# Patient Record
Sex: MALE | Race: BLACK OR AFRICAN AMERICAN | NOT HISPANIC OR LATINO | Employment: UNEMPLOYED | URBAN - METROPOLITAN AREA
[De-identification: names, ages, dates, MRNs, and addresses within clinical notes are randomized per-mention and may not be internally consistent; named-entity substitution may affect disease eponyms.]

---

## 2017-03-03 ENCOUNTER — HOSPITAL ENCOUNTER (EMERGENCY)
Facility: HOSPITAL | Age: 57
Discharge: HOME | End: 2017-03-03
Attending: EMERGENCY MEDICINE
Payer: MEDICARE

## 2017-03-03 VITALS
SYSTOLIC BLOOD PRESSURE: 126 MMHG | HEART RATE: 122 BPM | OXYGEN SATURATION: 95 % | RESPIRATION RATE: 19 BRPM | TEMPERATURE: 99 F | DIASTOLIC BLOOD PRESSURE: 91 MMHG | HEIGHT: 66 IN | BODY MASS INDEX: 16.07 KG/M2 | WEIGHT: 100 LBS

## 2017-03-03 DIAGNOSIS — F10.920 ALCOHOL INTOXICATION, UNCOMPLICATED: Primary | ICD-10-CM

## 2017-03-03 PROCEDURE — 63600175 PHARM REV CODE 636 W HCPCS: Performed by: EMERGENCY MEDICINE

## 2017-03-03 PROCEDURE — 99285 EMERGENCY DEPT VISIT HI MDM: CPT | Mod: 25

## 2017-03-03 PROCEDURE — 25000003 PHARM REV CODE 250: Performed by: EMERGENCY MEDICINE

## 2017-03-03 PROCEDURE — 96372 THER/PROPH/DIAG INJ SC/IM: CPT

## 2017-03-03 RX ORDER — DIPHENHYDRAMINE HYDROCHLORIDE 50 MG/ML
25 INJECTION INTRAMUSCULAR; INTRAVENOUS
Status: DISCONTINUED | OUTPATIENT
Start: 2017-03-03 | End: 2017-03-03 | Stop reason: HOSPADM

## 2017-03-03 RX ORDER — LORAZEPAM 2 MG/ML
1 INJECTION INTRAMUSCULAR
Status: DISCONTINUED | OUTPATIENT
Start: 2017-03-03 | End: 2017-03-03 | Stop reason: HOSPADM

## 2017-03-03 RX ORDER — ZIPRASIDONE MESYLATE 20 MG/ML
20 INJECTION, POWDER, LYOPHILIZED, FOR SOLUTION INTRAMUSCULAR
Status: COMPLETED | OUTPATIENT
Start: 2017-03-03 | End: 2017-03-03

## 2017-03-03 RX ORDER — ACETAMINOPHEN 325 MG/1
650 TABLET ORAL
Status: COMPLETED | OUTPATIENT
Start: 2017-03-03 | End: 2017-03-03

## 2017-03-03 RX ADMIN — ZIPRASIDONE MESYLATE 20 MG: 20 INJECTION, POWDER, LYOPHILIZED, FOR SOLUTION INTRAMUSCULAR at 06:03

## 2017-03-03 RX ADMIN — ACETAMINOPHEN 650 MG: 325 TABLET ORAL at 12:03

## 2017-03-03 NOTE — ED AVS SNAPSHOT
[facility]  [address] [facility] [address]               Christiano JOE Baer   3/3/2017  5:08 AM   ED    Description:  Male : 1960   Department:  [facility]           Your Care was Coordinated By:     Provider Role From To    Abhijit England MD Attending Provider 17 0511 --      Reason for Visit     Altered Mental Status           Diagnoses this Visit        Comments    Alcohol intoxication, uncomplicated    -  Primary       ED Disposition     None           To Do List           Follow-up Information     Schedule an appointment as soon as possible for a visit with [facility].    Specialty:  Family Medicine    Contact information:    [address], [address]  [facility] [address] [phone]      [facility]     [facility] Care [facility] -  Assistance  Registered nurses in the [facility] provide clinical advisement, health education, appointment booking, and other advisory services.  Call for this free service at [phone].             Medications           Message regarding Medications     Verify the changes and/or additions to your medication regime listed below are the same as discussed with your clinician today.  If any of these changes or additions are incorrect, please notify your healthcare provider.        These medications were administered today        Dose Freq    ziprasidone injection 20 mg 20 mg ED 1 Time    Sig: Inject 20 mg into the muscle ED 1 Time.    Class: Normal    Route: Intramuscular    diphenhydrAMINE injection 25 mg 25 mg ED 1 Time    Sig: Inject 0.5 mLs (25 mg total) into the muscle ED 1 Time.    Class: Normal    Route: Intramuscular    lorazepam injection 1 mg 1 mg ED 1 Time    Sig: Inject 0.5 mLs (1 mg total) into the muscle ED 1 Time.    Class: Normal    Route: Intramuscular    acetaminophen tablet 650 mg 650 mg ED 1 Time    Sig: Take 2 tablets  "(650 mg total) by mouth ED 1 Time.    Class: Normal    Route: Oral           Verify that the below list of medications is an accurate representation of the medications you are currently taking.  If none reported, the list may be blank. If incorrect, please contact your healthcare provider. Carry this list with you in case of emergency.           Current Medications     albuterol 90 mcg/actuation inhaler Inhale 1-2 puffs into the lungs every 6 (six) hours as needed for Wheezing.    diphenhydrAMINE injection 25 mg Inject 0.5 mLs (25 mg total) into the muscle ED 1 Time.    lorazepam injection 1 mg Inject 0.5 mLs (1 mg total) into the muscle ED 1 Time.           Clinical Reference Information           Your Vitals Were     BP Pulse Temp Resp Height Weight    126/91 (BP Location: Right arm, Patient Position: Sitting, BP Method: Automatic) 122 98.9 °F (37.2 °C) (Oral) 19 5' 6" (1.676 m) 45.4 kg (100 lb)    SpO2 BMI             95% 16.14 kg/m2         Allergies as of 3/3/2017     No Known Allergies      Immunizations Administered on Date of Encounter - 3/3/2017     None      ED Micro, Lab, POCT     None      ED Imaging Orders     None      Discharge References/Attachments     ALCOHOL ABUSE (ENGLISH)      [facility] Sign-Up     Activating your [facility] account is as easy as 1-2-3!     1) Visit [email or web address], select Sign Up Now, enter this activation code and your date of birth, then select Next.  [email or web address]  Expires: 4/17/2017  5:51 AM      2) Create a username and password to use when you visit [facility] in the future and select a security question in case you lose your password and select Next.    3) Enter your e-mail address and click Sign Up!    Additional Information  If you have questions, please e-mail [email or web address] or call [phone] to talk to our [facility] staff. Remember, [facility] is NOT to be used for urgent needs. For medical emergencies, dial 911.          [facility] " complies with applicable Federal civil rights laws and does not discriminate on the basis of race, color, national origin, age, disability, or sex.        Language Assistance Services     ATTENTION: Language assistance services are available, free of charge. Please call [phone].      ATENCIÓN: Si habla español, tiene a ron disposición servicios gratuitos de asistencia lingüística. Llame al [phone].     CHÚ Ý: N?u b?n nói Ti?ng Vi?t, có các d?ch v? h? tr? ngôn ng? mi?n phí dành cho b?n. G?i s? [phone].

## 2017-03-03 NOTE — ED PROVIDER NOTES
Encounter Date: 3/3/2017       History     Chief Complaint   Patient presents with    Altered Mental Status     Pt was found at [employer] with +ETOH, EMS was called for pt sleeping on the floor. Pt's language is slurred and difficult to understand; pt has strong ETOH smell.      Review of patient's allergies indicates:  No Known Allergies  HPI Comments: 56-year-old male brought to the ED after being found sleeping at a gas station.  Patient denies any complaints, admits to alcohol intake but declines to report how much.  Denies any symptoms at this time.    The history is provided by the patient and the EMS personnel.     History reviewed. No pertinent past medical history.  Past Surgical History:   Procedure Laterality Date    CHEST TUBE INSERTION  November 2013     History reviewed. No pertinent family history.  Social History   Substance Use Topics    Smoking status: Current Every Day Smoker     Packs/day: 1.00     Types: Cigarettes    Smokeless tobacco: None    Alcohol use Yes      Comment: 2 pints of vodka last night     Review of Systems   Constitutional: Negative for chills, fatigue and fever.   HENT: Negative for congestion, rhinorrhea, sore throat and voice change.    Eyes: Negative for photophobia, pain and redness.   Respiratory: Negative for cough, choking and shortness of breath.    Cardiovascular: Negative for chest pain, palpitations and leg swelling.   Gastrointestinal: Negative for abdominal pain, diarrhea, nausea and vomiting.   Genitourinary: Negative for dysuria, frequency and urgency.   Musculoskeletal: Negative for back pain, neck pain and neck stiffness.   Neurological: Negative for seizures, speech difficulty, light-headedness, numbness and headaches.   All other systems reviewed and are negative.      Physical Exam   Initial Vitals   BP Pulse Resp Temp SpO2   03/03/17 0510 03/03/17 0510 03/03/17 0510 03/03/17 0510 03/03/17 0510   143/85 91 15 98.9 °F (37.2 °C) 95 %      Physical Exam    Nursing note and vitals reviewed.  Constitutional: He appears well-developed and well-nourished. No distress.   HENT:   Head: Normocephalic and atraumatic.   Dry mucous membranes   Eyes: Conjunctivae and EOM are normal. Pupils are equal, round, and reactive to light.   Neck: Normal range of motion. Neck supple. No tracheal deviation present.   Cardiovascular: Normal rate, regular rhythm, normal heart sounds and intact distal pulses.   Pulmonary/Chest: Breath sounds normal. No respiratory distress. He has no wheezes. He has no rhonchi. He has no rales.   Abdominal: Soft. Bowel sounds are normal. He exhibits no distension. There is no tenderness. There is no rebound and no guarding.   Musculoskeletal: Normal range of motion. He exhibits no edema or tenderness.   Neurological: He is alert and oriented to person, place, and time. He has normal strength. No cranial nerve deficit or sensory deficit.   Slurred speech consistent with alcohol intoxication   Skin: Skin is warm and dry.         ED Course   Procedures  Labs Reviewed - No data to display          Medical Decision Making:   Initial Assessment:   56-year-old male brought to the emergency department by EMS with admitted alcohol intake  Differential Diagnosis:   Head trauma, metabolic crisis, polysubstance abuse  ED Management:  Patient became agitated, cursing at the staff.  Consistent with intoxicated behavior.  He was administered Geodon 20 mg IM while we wait for clinical sobriety.    Handed off to Dr. Wade for reevaluation and disposition.                   ED Course     Clinical Impression:   The encounter diagnosis was Alcohol intoxication, uncomplicated.    Disposition:   Disposition: Discharged  Condition: Stable  Please see Dr. Wade's progress note for summary of disposition       Abhijit England MD  03/30/17 5651

## 2017-03-03 NOTE — ED NOTES
When MD went to place nasal trumpet pt became agitated and pulled out trumpet. NC remains in place with SPO2 92%, will continue to monitor.

## 2017-03-03 NOTE — PROVIDER PROGRESS NOTES - EMERGENCY DEPT.
Encounter Date: 3/3/2017    ED Physician Progress Notes        12:57 PM patient slept for hours  after his required chemical restraints.  He later had some decreasing sats (in the 90's, per the nurse). Of note, he was checked on 2 occasions briefly by me, I walked into the room and pt was still sedated but with normal respirations; also his nurse assessed him periodly, and on one check his sats were noted to be lower, so she put 2 liter O2 on him-when she rechecked him and found the sats to be dropping into upper 80's she then informed me--so I asked for a nasal trumpet--pt Immediately sat up and pulled the trumpet out and asked why I did that. From that point on pt was much more awake and talking and sitting up.  I later spoke to his daughter, who said her dad drinks heavily and she thinks he just needed to sleep off his alcohol. She will pick him up.  Just prior to his discharge, I spoke with pt, observed him ambulate, and discussed with him his presentation and care in ER. He was very pleasant and GSC 15 and generally nonfocal neuro exam, complained of a mild frontal headache so I ordered a Tylenol.    See nurses noted for specific times of these incidents

## 2017-03-03 NOTE — ED NOTES
Pt presents to ED via EMS from [employer]. EMS reports that they were called out by [employer] for the pt sleeping on the ground. EMS reports that CBG was 123. Pt reports drinking alcohol today, is AAOx4, eyes are red, denies any complaints. Pt requests the lights be turned off so that he can go to sleep.

## 2017-03-03 NOTE — ED NOTES
Pt yelling from room. Upon entering room pt is yelling obscenities. Security at BS. Pt refuses to calm down. MD notified.

## 2017-03-20 ENCOUNTER — HOSPITAL ENCOUNTER (EMERGENCY)
Facility: HOSPITAL | Age: 57
Discharge: HOME | End: 2017-03-20
Attending: EMERGENCY MEDICINE
Payer: MEDICARE

## 2017-03-20 VITALS
HEART RATE: 100 BPM | TEMPERATURE: 98 F | OXYGEN SATURATION: 98 % | SYSTOLIC BLOOD PRESSURE: 102 MMHG | RESPIRATION RATE: 18 BRPM | DIASTOLIC BLOOD PRESSURE: 76 MMHG

## 2017-03-20 DIAGNOSIS — F10.920 ALCOHOL INTOXICATION, UNCOMPLICATED: Primary | ICD-10-CM

## 2017-03-20 LAB
ALBUMIN SERPL BCP-MCNC: 2.7 G/DL
ALP SERPL-CCNC: 167 U/L
ALT SERPL W/O P-5'-P-CCNC: 20 U/L
ANION GAP SERPL CALC-SCNC: 13 MMOL/L
ANISOCYTOSIS BLD QL SMEAR: SLIGHT
AST SERPL-CCNC: 32 U/L
BASOPHILS # BLD AUTO: 0.08 K/UL
BASOPHILS NFR BLD: 1.3 %
BILIRUB SERPL-MCNC: 0.5 MG/DL
BUN SERPL-MCNC: 13 MG/DL
CALCIUM SERPL-MCNC: 8.3 MG/DL
CHLORIDE SERPL-SCNC: 105 MMOL/L
CO2 SERPL-SCNC: 22 MMOL/L
CREAT SERPL-MCNC: 0.9 MG/DL
DACRYOCYTES BLD QL SMEAR: ABNORMAL
DIFFERENTIAL METHOD: ABNORMAL
EOSINOPHIL # BLD AUTO: 0.1 K/UL
EOSINOPHIL NFR BLD: 0.8 %
ERYTHROCYTE [DISTWIDTH] IN BLOOD BY AUTOMATED COUNT: 15 %
EST. GFR  (AFRICAN AMERICAN): >60 ML/MIN/1.73 M^2
EST. GFR  (NON AFRICAN AMERICAN): >60 ML/MIN/1.73 M^2
ETHANOL SERPL-MCNC: 370 MG/DL
GLUCOSE SERPL-MCNC: 79 MG/DL
HCT VFR BLD AUTO: 44.9 %
HGB BLD-MCNC: 15.4 G/DL
HYPOCHROMIA BLD QL SMEAR: ABNORMAL
LYMPHOCYTES # BLD AUTO: 2.4 K/UL
LYMPHOCYTES NFR BLD: 39.5 %
MCH RBC QN AUTO: 34.8 PG
MCHC RBC AUTO-ENTMCNC: 34.3 %
MCV RBC AUTO: 101 FL
MONOCYTES # BLD AUTO: 0.5 K/UL
MONOCYTES NFR BLD: 8.6 %
NEUTROPHILS # BLD AUTO: 3 K/UL
NEUTROPHILS NFR BLD: 49.8 %
OVALOCYTES BLD QL SMEAR: ABNORMAL
PLATELET # BLD AUTO: 265 K/UL
PLATELET BLD QL SMEAR: ABNORMAL
PMV BLD AUTO: 10.4 FL
POIKILOCYTOSIS BLD QL SMEAR: SLIGHT
POTASSIUM SERPL-SCNC: 4.1 MMOL/L
PROT SERPL-MCNC: 7.9 G/DL
RBC # BLD AUTO: 4.43 M/UL
SODIUM SERPL-SCNC: 140 MMOL/L
TARGETS BLD QL SMEAR: ABNORMAL
WBC # BLD AUTO: 6.03 K/UL

## 2017-03-20 PROCEDURE — 99284 EMERGENCY DEPT VISIT MOD MDM: CPT

## 2017-03-20 PROCEDURE — 80053 COMPREHEN METABOLIC PANEL: CPT

## 2017-03-20 PROCEDURE — 85025 COMPLETE CBC W/AUTO DIFF WBC: CPT

## 2017-03-20 PROCEDURE — 93005 ELECTROCARDIOGRAM TRACING: CPT

## 2017-03-20 PROCEDURE — 80320 DRUG SCREEN QUANTALCOHOLS: CPT

## 2017-03-20 NOTE — ED PROVIDER NOTES
Encounter Date: 3/20/2017       History     Chief Complaint   Patient presents with    Fall     fell yesterday, lac to back of head, +history of etoh abuse, c/o generalized weakness     Review of patient's allergies indicates:  No Known Allergies  HPI Comments: Patient is a 56-year-old male brought in by EMS intoxicated.  Patient has a long-standing history of alcoholism.  He denies any illicit drug abuse.  He denies suicidal ideation.    The history is provided by the patient and the EMS personnel.     Past Medical History:   Diagnosis Date    ETOH abuse      Past Surgical History:   Procedure Laterality Date    CHEST TUBE INSERTION  November 2013     History reviewed. No pertinent family history.  Social History   Substance Use Topics    Smoking status: Current Every Day Smoker     Packs/day: 1.00     Types: Cigarettes    Smokeless tobacco: None    Alcohol use Yes      Comment: 2 pints of vodka last night     Review of Systems   Reason unable to perform ROS: Patient heavily intoxicated.       Physical Exam   Initial Vitals   BP Pulse Resp Temp SpO2   03/20/17 1511 03/20/17 1511 03/20/17 1511 03/20/17 1511 03/20/17 1511   102/76 100 18 98.4 °F (36.9 °C) 98 %     Physical Exam    Nursing note and vitals reviewed.  Constitutional:   Disheveled   HENT:   Head: Normocephalic.   Eyes: EOM are normal. Pupils are equal, round, and reactive to light.   Neck: Normal range of motion. Neck supple.   Cardiovascular: Normal rate, regular rhythm and normal heart sounds.   Pulmonary/Chest: Breath sounds normal.   Abdominal: Soft. There is no tenderness.   Musculoskeletal: Normal range of motion. He exhibits no edema.   Neurological: He is alert.   Skin: Skin is warm and dry.   Psychiatric: His behavior is normal.         ED Course   Procedures  Labs Reviewed   CBC W/ AUTO DIFFERENTIAL - Abnormal; Notable for the following:        Result Value    RBC 4.43 (*)      (*)     MCH 34.8 (*)     RDW 15.0 (*)     All other  components within normal limits   COMPREHENSIVE METABOLIC PANEL - Abnormal; Notable for the following:     CO2 22 (*)     Calcium 8.3 (*)     Albumin 2.7 (*)     Alkaline Phosphatase 167 (*)     All other components within normal limits   ALCOHOL,MEDICAL (ETHANOL) - Abnormal; Notable for the following:     Alcohol, Medical, Serum 370 (*)     All other components within normal limits    Narrative:         Alcohol level critical result(s) called and verbal readback   obtained from  Fiona Mendez RN. at 15:40 on 03/20/2017, 03/20/2017   15:40   URINALYSIS   DRUG SCREEN PANEL, URINE EMERGENCY     Imaging Results         X-Ray Chest PA And Lateral (Final result) Result time:  03/20/17 15:00:40    Final result by Sachi Limon MD (03/20/17 15:00:40)    Impression:     No significant change from the prior study.  There is significant architectural distortion of the right hemithorax which is unchanged.      Electronically signed by: SACHI LIMON MD  Date:     03/20/17  Time:    15:00     Narrative:    PA and lateral chest radiographs    Comparison: 11/8/14    Results: The cardiac silhouette is midline.  Architectural distortion right hemithorax is similar to the prior exam, there are large bullae.  The left hemithorax is relatively well aerated.  Minimal linear lung markings at the lung base suggestive of minimal atelectasis.  No focal air space disease.  No pleural effusion.  No pneumothorax.  Remote left rib fractures.              EKG Readings: (Independently Interpreted)   Rhythm: Sinus Tachycardia. Heart Rate: 106. ST Segments: Normal ST Segments. T Waves: Normal.                            ED Course     Clinical Impression:   The encounter diagnosis was Alcohol intoxication, uncomplicated.          William Gamnig MD  03/21/17 0902

## 2017-03-20 NOTE — ED AVS SNAPSHOT
[facility]  [address]  [place] [address]               Christiano Baer   3/20/2017  2:19 PM   ED    Description:  Male : 1960   Department:  [facility]           Your Care was Coordinated By:     Provider Role From To    William Gaming MD Attending Provider 17 1446 --      Reason for Visit     Fall           Diagnoses this Visit        Comments    Alcohol intoxication, uncomplicated    -  Primary       ED Disposition     None           To Do List           Follow-up Information     Follow up with [facility].    Specialty:  Emergency Medicine    Why:  As needed    Contact information:    [place] [address] [phone]      [facility]     [facility] -  Assistance  Registered nurses in the [facility] provide clinical advisement, health education, appointment booking, and other advisory services.  Call for this free service at [phone].             Medications           Message regarding Medications     Verify the changes and/or additions to your medication regime listed below are the same as discussed with your clinician today.  If any of these changes or additions are incorrect, please notify your healthcare provider.             Verify that the below list of medications is an accurate representation of the medications you are currently taking.  If none reported, the list may be blank. If incorrect, please contact your healthcare provider. Carry this list with you in case of emergency.           Current Medications     albuterol 90 mcg/actuation inhaler Inhale 1-2 puffs into the lungs every 6 (six) hours as needed for Wheezing.           Clinical Reference Information           Your Vitals Were     BP Pulse Temp Resp SpO2       102/76 100 98.4 °F (36.9 °C) 18 98%       Allergies as of 3/20/2017     No Known Allergies       Immunizations Administered on Date of Encounter - 3/20/2017     None      ED Micro, Lab, POCT     Start Ordered       Status Ordering Provider    03/20/17 1428 03/20/17 1427  Urinalysis  STAT      Acknowledged     03/20/17 1428 03/20/17 1427  Drug screen panel, emergency  STAT      Acknowledged     03/20/17 1428 03/20/17 1427  Ethanol  Once      Final result     03/20/17 1427 03/20/17 1427  CBC auto differential  Once      Final result     03/20/17 1427 03/20/17 1427  Comprehensive metabolic panel  Once      Final result       ED Imaging Orders     Start Ordered       Status Ordering Provider    03/20/17 1427 03/20/17 1427  X-Ray Chest PA And Lateral  1 time imaging      Final result         Discharge Instructions         Alcohol Intoxication  Alcohol intoxication occurs when you drink alcohol faster than your liver can remove it from your system. The following facts are important to remember:  · It can take 10 minutes or more to start to feel the effects of a drink, so you can easily get more intoxicated than you intended.  · One drink may be more than 1 serving of alcohol. Depending on the drink, it can be 2 to 4 servings.  · It takes about an hour for your body to metabolize (clear) 1 serving. If you have more than 1 drink, it can take a couple of hours or more.  · Many things affect how drinks will affect you, including whether youve eaten, how fast you drink, your size, how much you normally drink (or not), medicines you take, chronic diseases you have, and gender.  Signs and symptoms of alcohol poisoning  The following are signs and symptoms of alcohol poisoning:  Mild impairment  · Reduced inhibitions  · Slurred speech  · Drowsiness  · Decreased fine motor skills  Moderate impairment  · Erratic behavior, aggression, depression  · Impaired judgment  · Confusion  · Concentration difficulties  · Coordination problems  Severe impairment  · Vomiting  · Seizures  · Unconsciousness  · Cold, clammy  · Slow or  "irregular breathing  · Hypothermia (low body temperature)  · Coma  Health effects  Alcohol abuse causes health problems. Sometimes this can happen after only drinking a little." There is no set number of drinks or amount of alcohol that defines too much. The more you drink at one time, and the more frequently you drink determine both the short-term and long-term health effects. It affects all parts of your body and your health, including your:  · Brain. Alcohol is a central nervous system depressant. It can damage parts of the brain that affect your balance, memory, thinking, and emotions. It can cause memory loss, blackouts, depression, agitation, sleep cycle changes, and seizures. These changes may or may not be reversible.  · Heart and vascular system. Alcohol affects multiple areas. It can damage heart muscle causing cardiomyopathy, which is a weakening and stretching of the heart muscle. This can lead to trouble breathing, an irregular heartbeat, atrial fibrillation, leg swelling, and heart failure. It makes the blood vessels stiffen causing hypertension (high blood pressure). All of these problems increase your risk of having heart attacks or strokes.  · Liver. Alcohol causes fat to build up in the liver, affecting its normal function. This increases the risk for hepatitis, leading to abdominal pain, appetite loss, jaundice, bleeding problems, liver fibrosis, and cirrhosis. This in turn can affect your ability to fight off infections, and can cause diabetes. The liver changes prevent it from removing toxins in your blood that can cause encephalopathy. Signs of this are confusion, altered level of consciousness, personality changes, memory loss, seizures, coma, and death.  · Pancreas. Alcohol can cause inflammation of the pancreas, or pancreatitis. This can cause pain in your abdomen, fever, and diabetes.  · Immune system. Alcohol weakens your immune system in a number of ways. It suppresses your immune system " making it harder to fight off infections and colds. You will also have a higher risk of certain infections like pneumonia and tuberculosis.  · Cancer risk. Alcohol raises your risk of cancer of the mouth, esophagus, pharynx, larynx, liver, and breast.  · Sexual function. Alcohol abuse can also lead to sexual problems.  Alcohol use during pregnancy may cause permanent damage to the growing baby.  Home care  The following guidelines will help you care for yourself at home:  · Don't drink any more alcohol.  · Don't drive until all effects of the alcohol have worn off.  · Don't operate machinery that can cause injuries.  · Get lots of rest over the next few days. Drink plenty of water and other non-alcoholic liquids. Try to eat regular meals.  · If you have been drinking heavily on a daily basis, you may go through alcohol withdrawal. The usual symptoms last 3 to 4 days and may include nervousness, shakiness, nausea, sweating, sleeplessness, and can even cause seizures and a serious withdrawal symptom called delirium tremens, or DTs. During this time, it is best that you stay with family or friends who can help and support you. You can also admit yourself to a residential detox program. If your symptoms are severe (seizures, severe shakiness, confusion), contact your doctor or call an ambulance for help (see below).   Follow-up care  If alcohol is a problem in your life, these are some organizations that can help you:  · Alcoholics Anonymous offers support through a self-help fellowship. There are no dues or fees. See the Yellow Pages and call for time and place of meetings. Find AA online at www.aa.org.  · [employer] offers support to families of alcohol users. Contact 251-590-3591, or online at www.al-anon.org.  · National [place] on Alcoholism and Drug Dependence can be reached at 959-444-2419, or online at www.ncadd.org.  · There are also inpatient and residential alcohol detox programs. Check the Internet or phonebook  Yellow Pages under Drug Abuse and Treatment Centers.  Call 911  Call 911 if any of these occur:  · Trouble breathing or slow irregular breathing  · Chest pain  · Sudden weakness on one side of your body or sudden trouble speaking  · Heavy bleeding or vomiting blood  · Very drowsy or trouble awakening  · Fainting or loss of consciousness  · Rapid heart rate  · Seizure  When to seek medical advice  Call your healthcare provider right away if any of these occur:  · Severe shakiness   · Fever over 100.4º F (38.0º C)  · Confusion or hallucinations (seeing, hearing, or feeling things that are not there)  · Pain in your upper abdomen that gets worse  · Repeated vomiting  Date Last Reviewed: 6/1/2016  © 3631-6110 [employer]. [address] [place] [address], [place] [address]. All rights reserved. This information is not intended as a substitute for professional medical care. Always follow your healthcare professional's instructions.          [facility] Sign-Up     Activating your [facility] account is as easy as 1-2-3!     1) Visit [email or web address], select Sign Up Now, enter this activation code and your date of birth, then select Next.  [email or web address]  Expires: 4/17/2017  6:51 AM      2) Create a username and password to use when you visit [facility] in the future and select a security question in case you lose your password and select Next.    3) Enter your e-mail address and click Sign Up!    Additional Information  If you have questions, please e-mail [email or web address] or call [phone] to talk to our [facility] staff. Remember, [facility] is NOT to be used for urgent needs. For medical emergencies, dial 911.         Smoking Cessation     If you would like to quit smoking:   You may be eligible for free services if you are a [place] resident and started smoking cigarettes before September 1, 1988.  Call the Smoking Cessation Trust (SCT) toll free at (878) 271-0257 or (307) 568-9815.   Call 8-408-KEGT-NOW  if you do not meet the above criteria.             [facility] complies with applicable Federal civil rights laws and does not discriminate on the basis of race, color, national origin, age, disability, or sex.        Language Assistance Services     ATTENTION: Language assistance services are available, free of charge. Please call [phone].      ATENCIÓN: Si habla español, tiene a ron disposición servicios gratuitos de asistencia lingüística. Llame al [phone].     CHÚ Ý: N?u b?n nói Ti?ng Vi?t, có các d?ch v? h? tr? ngôn ng? mi?n phí dành cho b?n. G?i s? [phone].

## 2017-03-20 NOTE — DISCHARGE INSTRUCTIONS
"  Alcohol Intoxication  Alcohol intoxication occurs when you drink alcohol faster than your liver can remove it from your system. The following facts are important to remember:  · It can take 10 minutes or more to start to feel the effects of a drink, so you can easily get more intoxicated than you intended.  · One drink may be more than 1 serving of alcohol. Depending on the drink, it can be 2 to 4 servings.  · It takes about an hour for your body to metabolize (clear) 1 serving. If you have more than 1 drink, it can take a couple of hours or more.  · Many things affect how drinks will affect you, including whether youve eaten, how fast you drink, your size, how much you normally drink (or not), medicines you take, chronic diseases you have, and gender.  Signs and symptoms of alcohol poisoning  The following are signs and symptoms of alcohol poisoning:  Mild impairment  · Reduced inhibitions  · Slurred speech  · Drowsiness  · Decreased fine motor skills  Moderate impairment  · Erratic behavior, aggression, depression  · Impaired judgment  · Confusion  · Concentration difficulties  · Coordination problems  Severe impairment  · Vomiting  · Seizures  · Unconsciousness  · Cold, clammy  · Slow or irregular breathing  · Hypothermia (low body temperature)  · Coma  Health effects  Alcohol abuse causes health problems. Sometimes this can happen after only drinking a little." There is no set number of drinks or amount of alcohol that defines too much. The more you drink at one time, and the more frequently you drink determine both the short-term and long-term health effects. It affects all parts of your body and your health, including your:  · Brain. Alcohol is a central nervous system depressant. It can damage parts of the brain that affect your balance, memory, thinking, and emotions. It can cause memory loss, blackouts, depression, agitation, sleep cycle changes, and seizures. These changes may or may not be " reversible.  · Heart and vascular system. Alcohol affects multiple areas. It can damage heart muscle causing cardiomyopathy, which is a weakening and stretching of the heart muscle. This can lead to trouble breathing, an irregular heartbeat, atrial fibrillation, leg swelling, and heart failure. It makes the blood vessels stiffen causing hypertension (high blood pressure). All of these problems increase your risk of having heart attacks or strokes.  · Liver. Alcohol causes fat to build up in the liver, affecting its normal function. This increases the risk for hepatitis, leading to abdominal pain, appetite loss, jaundice, bleeding problems, liver fibrosis, and cirrhosis. This in turn can affect your ability to fight off infections, and can cause diabetes. The liver changes prevent it from removing toxins in your blood that can cause encephalopathy. Signs of this are confusion, altered level of consciousness, personality changes, memory loss, seizures, coma, and death.  · Pancreas. Alcohol can cause inflammation of the pancreas, or pancreatitis. This can cause pain in your abdomen, fever, and diabetes.  · Immune system. Alcohol weakens your immune system in a number of ways. It suppresses your immune system making it harder to fight off infections and colds. You will also have a higher risk of certain infections like pneumonia and tuberculosis.  · Cancer risk. Alcohol raises your risk of cancer of the mouth, esophagus, pharynx, larynx, liver, and breast.  · Sexual function. Alcohol abuse can also lead to sexual problems.  Alcohol use during pregnancy may cause permanent damage to the growing baby.  Home care  The following guidelines will help you care for yourself at home:  · Don't drink any more alcohol.  · Don't drive until all effects of the alcohol have worn off.  · Don't operate machinery that can cause injuries.  · Get lots of rest over the next few days. Drink plenty of water and other non-alcoholic liquids.  Try to eat regular meals.  · If you have been drinking heavily on a daily basis, you may go through alcohol withdrawal. The usual symptoms last 3 to 4 days and may include nervousness, shakiness, nausea, sweating, sleeplessness, and can even cause seizures and a serious withdrawal symptom called delirium tremens, or DTs. During this time, it is best that you stay with family or friends who can help and support you. You can also admit yourself to a residential detox program. If your symptoms are severe (seizures, severe shakiness, confusion), contact your doctor or call an ambulance for help (see below).   Follow-up care  If alcohol is a problem in your life, these are some organizations that can help you:  · Alcoholics Anonymous offers support through a self-help fellowship. There are no dues or fees. See the Yellow Pages and call for time and place of meetings. Find AA online at www.aa.org.  · [facility] offers support to families of alcohol users. Contact 174-778-8544, or online at www.al-anon.org.  · National [place] on Alcoholism and Drug Dependence can be reached at 298-496-8487, or online at www.ncadd.org.  · There are also inpatient and residential alcohol detox programs. Check the Internet or phonebook Yellow Pages under Drug Abuse and Treatment Centers.  Call 911  Call 911 if any of these occur:  · Trouble breathing or slow irregular breathing  · Chest pain  · Sudden weakness on one side of your body or sudden trouble speaking  · Heavy bleeding or vomiting blood  · Very drowsy or trouble awakening  · Fainting or loss of consciousness  · Rapid heart rate  · Seizure  When to seek medical advice  Call your healthcare provider right away if any of these occur:  · Severe shakiness   · Fever over 100.4º F (38.0º C)  · Confusion or hallucinations (seeing, hearing, or feeling things that are not there)  · Pain in your upper abdomen that gets worse  · Repeated vomiting  Date Last Reviewed: 6/1/2016  © 2172-5103 [employer]. [address] [place] [address], [place] [address]. All rights reserved. This information is not intended as a substitute for professional medical care. Always follow your healthcare professional's instructions.

## 2017-03-21 NOTE — ED NOTES
The patient is resting quietly on wall stretcher. His mood is labile and he is loud at times. He is verbally redirectable. He is able to ambulate without assistance. Attempting to contact pt. Daughter at request for discharge ride. Pt. Is tolerating h20 well.

## 2017-04-16 ENCOUNTER — HOSPITAL ENCOUNTER (EMERGENCY)
Facility: HOSPITAL | Age: 57
Discharge: HOME | End: 2017-04-17
Attending: EMERGENCY MEDICINE
Payer: MEDICARE

## 2017-04-16 DIAGNOSIS — S61.412A LACERATION OF LEFT HAND WITHOUT FOREIGN BODY, INITIAL ENCOUNTER: ICD-10-CM

## 2017-04-16 DIAGNOSIS — S61.411A LACERATION OF RIGHT HAND: Primary | ICD-10-CM

## 2017-04-16 PROCEDURE — 63600175 PHARM REV CODE 636 W HCPCS: Performed by: EMERGENCY MEDICINE

## 2017-04-16 PROCEDURE — 99284 EMERGENCY DEPT VISIT MOD MDM: CPT | Mod: 25

## 2017-04-16 PROCEDURE — 96372 THER/PROPH/DIAG INJ SC/IM: CPT

## 2017-04-16 PROCEDURE — 12044 INTMD RPR N-HF/GENIT7.6-12.5: CPT

## 2017-04-16 RX ORDER — CEFAZOLIN SODIUM 1 G/3ML
1 INJECTION, POWDER, FOR SOLUTION INTRAMUSCULAR; INTRAVENOUS
Status: COMPLETED | OUTPATIENT
Start: 2017-04-16 | End: 2017-04-16

## 2017-04-16 RX ORDER — KETOROLAC TROMETHAMINE 30 MG/ML
15 INJECTION, SOLUTION INTRAMUSCULAR; INTRAVENOUS
Status: COMPLETED | OUTPATIENT
Start: 2017-04-16 | End: 2017-04-16

## 2017-04-16 RX ORDER — LIDOCAINE HYDROCHLORIDE 10 MG/ML
10 INJECTION INFILTRATION; PERINEURAL
Status: DISCONTINUED | OUTPATIENT
Start: 2017-04-16 | End: 2017-04-17 | Stop reason: HOSPADM

## 2017-04-16 RX ADMIN — CLOSTRIDIUM TETANI TOXOID ANTIGEN (FORMALDEHYDE INACTIVATED), CORYNEBACTERIUM DIPHTHERIAE TOXOID ANTIGEN (FORMALDEHYDE INACTIVATED), BORDETELLA PERTUSSIS TOXOID ANTIGEN (GLUTARALDEHYDE INACTIVATED), BORDETELLA PERTUSSIS FILAMENTOUS HEMAGGLUTININ ANTIGEN (FORMALDEHYDE INACTIVATED), BORDETELLA PERTUSSIS PERTACTIN ANTIGEN, AND BORDETELLA PERTUSSIS FIMBRIAE 2/3 ANTIGEN 0.5 ML: 5; 2; 2.5; 5; 3; 5 INJECTION, SUSPENSION INTRAMUSCULAR at 11:04

## 2017-04-16 RX ADMIN — CEFAZOLIN 1 G: 1 INJECTION, POWDER, FOR SOLUTION INTRAMUSCULAR; INTRAVENOUS; PARENTERAL at 11:04

## 2017-04-16 RX ADMIN — KETOROLAC TROMETHAMINE 15 MG: 30 INJECTION, SOLUTION INTRAMUSCULAR at 11:04

## 2017-04-16 NOTE — ED AVS SNAPSHOT
[facility]  [address]  [place] [address]               Christiano Baer   2017 10:13 PM   ED    Description:  Male : 1960   Department:  [facility]           Your Care was Coordinated By:     Provider Role From To    Anthony Li Jr., MD Attending Provider 17 1164 --      Reason for Visit     Laceration           Diagnoses this Visit        Comments    Laceration of right hand    -  Primary entered in error; the laceration is of the left hand.    Laceration of left hand without foreign body, initial encounter           ED Disposition     None           To Do List           Follow-up Information     Call Reza Pizarro Jr, MD.    Specialties:  Hand Surgery, Orthopedic Surgery    Why:  Call Monday for appointment for wound check this week.    Contact information:    [address]  [address]  [place] [address]  [phone]         These Medications        Disp Refills Start End    cephALEXin (KEFLEX) 500 MG capsule 20 capsule 0 2017    Take 1 capsule (500 mg total) by mouth 4 (four) times daily. - Oral    hydrocodone-acetaminophen 5-325mg (NORCO) 5-325 mg per tablet 18 tablet 0 2017     Take 1 tablet by mouth every 6 (six) hours as needed for Pain. - Oral    naproxen (NAPROSYN) 500 MG tablet 14 tablet 0 2017     Take 1 tablet (500 mg total) by mouth 2 (two) times daily with meals. - Oral      [facility]     [facility] - 24/ Assistance  Unless otherwise directed by your provider, please contact [facility], our nurse care line that is available for  assistance.     Registered nurses in the [facility] provide: appointment scheduling, clinical advisement, health education, and other advisory services.  Call: [phone] (toll free)               Medications           Message regarding Medications     Verify the changes and/or additions to your  medication regime listed below are the same as discussed with your clinician today.  If any of these changes or additions are incorrect, please notify your healthcare provider.        START taking these NEW medications        Refills    cephALEXin (KEFLEX) 500 MG capsule 0    Sig: Take 1 capsule (500 mg total) by mouth 4 (four) times daily.    Class: Print    Route: Oral    hydrocodone-acetaminophen 5-325mg (NORCO) 5-325 mg per tablet 0    Sig: Take 1 tablet by mouth every 6 (six) hours as needed for Pain.    Class: Print    Route: Oral    naproxen (NAPROSYN) 500 MG tablet 0    Sig: Take 1 tablet (500 mg total) by mouth 2 (two) times daily with meals.    Class: Print    Route: Oral      These medications were administered today        Dose Freq    Tdap vaccine injection 0.5 mL 0.5 mL Once    Starting on: 4/17/2017    Sig: Inject 0.5 mLs into the muscle once.    Class: Normal    Route: Intramuscular    ceFAZolin injection 1 g 1 g ED 1 Time    Sig: Inject 1,000 mg (1 g total) into the muscle ED 1 Time.    Class: Normal    Route: Intramuscular    ketorolac injection 15 mg 15 mg ED 1 Time    Sig: Inject 15 mg into the muscle ED 1 Time.    Class: Normal    Route: Intramuscular    lidocaine HCL 10 mg/ml (1%) injection 10 mL 10 mL ED 1 Time    Sig: 10 mLs by Infiltration route ED 1 Time.    Class: Normal    Route: Infiltration    neomycin-bacitracnZn-polymyxnB packet  ED 1 Time    Sig: Apply topically ED 1 Time.    Class: Normal    Route: Topical (Top)           Verify that the below list of medications is an accurate representation of the medications you are currently taking.  If none reported, the list may be blank. If incorrect, please contact your healthcare provider. Carry this list with you in case of emergency.           Current Medications     albuterol 90 mcg/actuation inhaler Inhale 1-2 puffs into the lungs every 6 (six) hours as needed for Wheezing.    cephALEXin (KEFLEX) 500 MG capsule Take 1 capsule (500 mg  "total) by mouth 4 (four) times daily.    hydrocodone-acetaminophen 5-325mg (NORCO) 5-325 mg per tablet Take 1 tablet by mouth every 6 (six) hours as needed for Pain.    lidocaine HCL 10 mg/ml (1%) injection 10 mL 10 mLs by Infiltration route ED 1 Time.    naproxen (NAPROSYN) 500 MG tablet Take 1 tablet (500 mg total) by mouth 2 (two) times daily with meals.           Clinical Reference Information           Your Vitals Were     BP Pulse Height Weight SpO2 BMI    116/76 (BP Location: Right arm, Patient Position: Sitting, BP Method: Automatic) 99 5' 10" (1.778 m) 54.4 kg (120 lb) 100% 17.22 kg/m2      Allergies as of 4/17/2017     No Known Allergies      Immunizations Administered on Date of Encounter - 4/17/2017     None      ED Micro, Lab, POCT     None      ED Imaging Orders     Start Ordered       Status Ordering Provider    04/16/17 2259 04/16/17 2252  X-Ray Hand 2 View Left  1 time imaging     Comments:  Rule out Foreign body in wound    Final result     04/16/17 2251 04/16/17 2252    1 time imaging,   Status:  Canceled     Comments:  Rule out Foreign body in wound    Canceled         Discharge Instructions         Extremity Laceration: Sutures, Staples, or Tape  A laceration is a cut through the skin. If it is deep, it may require stitches (sutures) or staples to close so it can heal. Minor cuts may be treated with surgical tape closures.   X-rays may be done if something may have entered the skin through the cut. You may also need a tetanus shot if you are not up to date on this vaccination.  Home care  Keep bandage on wound until you see Dr. Pizarro in 3 days for wound re-check.  · Follow the health care providers instructions on how to care for the cut.  · Wash your hands with soap and warm water before and after caring for your wound. This is to help prevent infection.  · Keep the wound clean and dry. If a bandage was applied and it becomes wet or dirty, replace it. Otherwise, leave it in place for the " first 24 hours, then change it once a day or as directed.  · If sutures or staples were used, clean the wound daily:  · After removing the bandage, wash the area with soap and water. Use a wet cotton swab to loosen and remove any blood or crust that forms.  · After cleaning, keep the wound clean and dry. Talk with your doctor before applying any antibiotic ointment to the wound. Reapply the bandage.  · You may remove the bandage to shower as usual after the first 24 hours, but do not soak the area in water (no swimming) until the stitches or staples are removed.  · If surgical tape closures were used, keep the area clean and dry. If it becomes wet, blot it dry with a towel.  · The doctor may prescribe an antibiotic cream or ointment to prevent infection. Do not stop taking this medication until you have finished the prescribed course or the doctor tells you to stop. The doctor may also prescribe medications for pain. Follow the doctors instructions for taking these medications.  · Avoid activities that may reopen your wound.  Follow-up care  Follow up with your health care provider. Most skin wounds heal within ten days. However, an infection may sometimes occur despite proper treatment. Therefore, check the wound daily for the signs of infection listed below. Stitches and staples should be removed within 7-14 days. If surgical tape closures were used, you may remove them after 10 days if they have not fallen off by then.   When to seek medical advice  Call your health care provider right away if any of these occur:  · Wound bleeding not controlled by direct pressure  · Signs of infection, including increasing pain in the wound, increasing wound redness or swelling, or pus or bad odor coming from the wound  · Fever of 100.4°F (38ºC) or higher or as directed by your healthcare provider  · Stitches or staples come apart or fall out or surgical tape falls off before 7 days  · Wound edges re-open  · Wound changes  colors  · Numbness around the wound   · Decreased movement around the injured area  Date Last Reviewed: 6/14/2015  © 4461-7035 [employer]. [address] [place] [address], [place] [address]. All rights reserved. This information is not intended as a substitute for professional medical care. Always follow your healthcare professional's instructions.          [facility] Sign-Up     Activating your [facility] account is as easy as 1-2-3!     1) Visit [email or web address], select Sign Up Now, enter this activation code and your date of birth, then select Next.  [email or web address]  Expires: 4/17/2017  6:51 AM      2) Create a username and password to use when you visit [facility] in the future and select a security question in case you lose your password and select Next.    3) Enter your e-mail address and click Sign Up!    Additional Information  If you have questions, please e-mail [email or web address] or call [phone] to talk to our [facility] staff. Remember, [facility] is NOT to be used for urgent needs. For medical emergencies, dial 911.         Smoking Cessation     If you would like to quit smoking:   You may be eligible for free services if you are a [place] resident and started smoking cigarettes before September 1, 1988.  Call the Smoking Cessation Trust ([facility]) toll free at (395) [phone] or (633) [phone].   Call 0-800-QUIT-NOW if you do not meet the above criteria.   Contact us via email: [email or web address]   View our website for more information: [email or web address]         [facility] complies with applicable Federal civil rights laws and does not discriminate on the basis of race, color, national origin, age, disability, or sex.        Language Assistance Services     ATTENTION: Language assistance services are available, free of charge. Please call [phone].      ATENCIÓN: Si habla español, tiene a ron disposición servicios gratuitos de asistencia lingüística.  Ilia martínez [phone].     MANUEL Ý: N?u b?n nói Ti?ng Vi?t, có các d?ch v? h? tr? frediôn ng? mi?n phí dành cho b?n. G?i s? [phone].

## 2017-04-17 VITALS
HEIGHT: 70 IN | BODY MASS INDEX: 17.18 KG/M2 | DIASTOLIC BLOOD PRESSURE: 81 MMHG | TEMPERATURE: 98 F | OXYGEN SATURATION: 97 % | HEART RATE: 100 BPM | SYSTOLIC BLOOD PRESSURE: 120 MMHG | WEIGHT: 120 LBS | RESPIRATION RATE: 18 BRPM

## 2017-04-17 PROCEDURE — 63600175 PHARM REV CODE 636 W HCPCS: Performed by: EMERGENCY MEDICINE

## 2017-04-17 PROCEDURE — 12044 INTMD RPR N-HF/GENIT7.6-12.5: CPT

## 2017-04-17 PROCEDURE — 90471 IMMUNIZATION ADMIN: CPT | Performed by: EMERGENCY MEDICINE

## 2017-04-17 PROCEDURE — 25000003 PHARM REV CODE 250: Performed by: EMERGENCY MEDICINE

## 2017-04-17 PROCEDURE — 90715 TDAP VACCINE 7 YRS/> IM: CPT | Performed by: EMERGENCY MEDICINE

## 2017-04-17 RX ORDER — CEPHALEXIN 500 MG/1
500 CAPSULE ORAL 4 TIMES DAILY
Qty: 20 CAPSULE | Refills: 0 | Status: SHIPPED | OUTPATIENT
Start: 2017-04-17 | End: 2017-04-22

## 2017-04-17 RX ORDER — HYDROCODONE BITARTRATE AND ACETAMINOPHEN 5; 325 MG/1; MG/1
1 TABLET ORAL EVERY 6 HOURS PRN
Qty: 18 TABLET | Refills: 0 | Status: SHIPPED | OUTPATIENT
Start: 2017-04-17 | End: 2017-06-23

## 2017-04-17 RX ORDER — NAPROXEN 500 MG/1
500 TABLET ORAL 2 TIMES DAILY WITH MEALS
Qty: 14 TABLET | Refills: 0 | Status: SHIPPED | OUTPATIENT
Start: 2017-04-17 | End: 2017-06-23

## 2017-04-17 RX ADMIN — BACITRACIN ZINC, NEOMYCIN SULFATE, POLYMYXIN B SULFATE 1 EACH: 3.5; 5000; 4 OINTMENT TOPICAL at 12:04

## 2017-04-17 NOTE — ED NOTES
Patient identifiers for Christiano Baer checked and correct.  LOC: The patient is awake, alert. Smells of ETOH.  APPEARANCE:Patient uncomfortable.  SKIN: Deep laceration to left hand. From palm of hand around left thumb.  MUSKULOSKELETAL: Laceration to left hand.  RESPIRATORY: Airway is open and patent, respirations are spontaneous, patient has a normal effort and rate.

## 2017-04-17 NOTE — ED NOTES
Called patient's daughter to pick him up.  States she cannot pick him up.  Patient does not know his address and neither does his daughter.  States she will call her brother to see if he can  patient.

## 2017-04-17 NOTE — DISCHARGE INSTRUCTIONS
Extremity Laceration: Sutures, Staples, or Tape  A laceration is a cut through the skin. If it is deep, it may require stitches (sutures) or staples to close so it can heal. Minor cuts may be treated with surgical tape closures.   X-rays may be done if something may have entered the skin through the cut. You may also need a tetanus shot if you are not up to date on this vaccination.  Home care  Keep bandage on wound until you see Dr. Pizarro in 3 days for wound re-check.  · Follow the health care providers instructions on how to care for the cut.  · Wash your hands with soap and warm water before and after caring for your wound. This is to help prevent infection.  · Keep the wound clean and dry. If a bandage was applied and it becomes wet or dirty, replace it. Otherwise, leave it in place for the first 24 hours, then change it once a day or as directed.  · If sutures or staples were used, clean the wound daily:  · After removing the bandage, wash the area with soap and water. Use a wet cotton swab to loosen and remove any blood or crust that forms.  · After cleaning, keep the wound clean and dry. Talk with your doctor before applying any antibiotic ointment to the wound. Reapply the bandage.  · You may remove the bandage to shower as usual after the first 24 hours, but do not soak the area in water (no swimming) until the stitches or staples are removed.  · If surgical tape closures were used, keep the area clean and dry. If it becomes wet, blot it dry with a towel.  · The doctor may prescribe an antibiotic cream or ointment to prevent infection. Do not stop taking this medication until you have finished the prescribed course or the doctor tells you to stop. The doctor may also prescribe medications for pain. Follow the doctors instructions for taking these medications.  · Avoid activities that may reopen your wound.  Follow-up care  Follow up with your health care provider. Most skin wounds heal within ten days.  However, an infection may sometimes occur despite proper treatment. Therefore, check the wound daily for the signs of infection listed below. Stitches and staples should be removed within 7-14 days. If surgical tape closures were used, you may remove them after 10 days if they have not fallen off by then.   When to seek medical advice  Call your health care provider right away if any of these occur:  · Wound bleeding not controlled by direct pressure  · Signs of infection, including increasing pain in the wound, increasing wound redness or swelling, or pus or bad odor coming from the wound  · Fever of 100.4°F (38ºC) or higher or as directed by your healthcare provider  · Stitches or staples come apart or fall out or surgical tape falls off before 7 days  · Wound edges re-open  · Wound changes colors  · Numbness around the wound   · Decreased movement around the injured area  Date Last Reviewed: 6/14/2015  © 1465-2288 [employer]. [address] [place] [address], [place] [address]. All rights reserved. This information is not intended as a substitute for professional medical care. Always follow your healthcare professional's instructions.

## 2017-04-17 NOTE — ED PROVIDER NOTES
Encounter Date: 4/16/2017       History     Chief Complaint   Patient presents with    Laceration     sustained laceration to left hand on broken glass - states locked himself out of house and broke glass with hand.      Review of patient's allergies indicates:  No Known Allergies  HPI Comments: Christiano Baer, a 56 y.o. male, complains of abrasion to his left hand.  The patient presents per EMS with a report that he was drinking alcohol and tried to break into his own home by breaking into a window and cut his left hand.  She denies any other injuries.  He cannot recall his last tetanus patient suspects was greater than 5 years.  Pain location:left hand   Pain severity: Moderate   Pain timinTonight   Pain characterSharp    Associated with or Modified by: (see above)   alcohol intoxication      Past Medical History:   Diagnosis Date    ETOH abuse      Past Surgical History:   Procedure Laterality Date    CHEST TUBE INSERTION  November 2013     History reviewed. No pertinent family history.  Social History   Substance Use Topics    Smoking status: Current Every Day Smoker     Packs/day: 1.00     Types: Cigarettes    Smokeless tobacco: None    Alcohol use Yes      Comment: 2 pints of vodka last night     Review of Systems   Constitutional: Negative.    Respiratory: Positive for cough.    Musculoskeletal:        Laceration to left hand   All other systems reviewed and are negative.      Physical Exam   Initial Vitals   BP Pulse Resp Temp SpO2   04/16/17 2216 04/16/17 2216 -- -- 04/16/17 2216   116/76 99   100 %     Physical Exam    Nursing note and vitals reviewed.  Constitutional: He appears well-developed and well-nourished. No distress.   Strong smell of alcohol.  Patient says he is intoxicated   Cardiovascular: Normal rate, regular rhythm and normal heart sounds.   Pulmonary/Chest: Breath sounds normal.   Abdominal: There is no tenderness.   Musculoskeletal:   There is a 7 cm laceration into the thenar  eminence of the left hand.  This full range of motion of all joints of all digits.  Distal neurovascular status is intact to all digits.  No other lacerations are identified.  No gross noted in the   Neurological: He is alert.   Psychiatric: He has a normal mood and affect. His behavior is normal. Thought content normal.         ED Course   Lac Repair  Date/Time: 4/17/2017 1:03 AM  Performed by: ELDER JIMÉNEZ JR  Authorized by: ELDER JIMÉNEZ JR   Body area: upper extremity  Location details: left hand  Laceration length: 8 cm  Foreign bodies: no foreign bodies  Tendon involvement: none  Nerve involvement: none  Vascular damage: no  Anesthesia: local infiltration    Anesthesia:  Anesthesia: local infiltration  Local Anesthetic: lidocaine 1% without epinephrine   Anesthetic total: 8 mL  Patient sedated: no  Preparation: Patient was prepped and draped in the usual sterile fashion.  Irrigation solution: saline  Irrigation method: syringe  Amount of cleaning: extensive  Debridement: none  Degree of undermining: none  Skin closure: 4-0 nylon  Fascia closure: 3-0 Vicryl  Number of sutures: muscle fascia Vicryl x 6; Skin: 4-0 x 13.  Technique: vertical mattress  Approximation: close  Approximation difficulty: complex  Dressing: antibiotic ointment, fine mesh gauze, bulky dressing and splint  Patient tolerance: Patient tolerated the procedure well with no immediate complications    Splint Application  Date/Time: 4/17/2017 1:06 AM  Performed by: ELDER JIMÉNEZ JR  Authorized by: ELDER JIMÉNEZ JR   Location details: left hand  Supplies used: aluminum splint  Post-procedure: The splinted body part was neurovascularly unchanged following the procedure.  Patient tolerance: Patient tolerated the procedure well with no immediate complications        Labs Reviewed - No data to display                            ED Course   Comment By Time   Sutured hand wound.  No FB in wound.  Hand splinted/NV intact.  Will refer to  Ortho on discharge for wound check in 3 days.  May DC when benji up. Anthony Li Jr., MD 04/17 0114   Patient care transferred to Dr. Reyna on shift change.  Plan to discharge patient when sober. Anthony iL Jr., MD 04/17 9163     Clinical Impression:   The primary encounter diagnosis was Laceration of right hand. A diagnosis of Laceration of left hand without foreign body, initial encounter was also pertinent to this visit.               Anthony Li Jr., MD  04/22/17 5825

## 2017-06-23 ENCOUNTER — HOSPITAL ENCOUNTER (INPATIENT)
Facility: HOSPITAL | Age: 57
LOS: 3 days | Discharge: NURSING, REHAB OR LONG-TERM CARE | DRG: 871 | End: 2017-06-26
Attending: EMERGENCY MEDICINE | Admitting: INTERNAL MEDICINE
Payer: MEDICARE

## 2017-06-23 DIAGNOSIS — Z72.0 TOBACCO ABUSE: ICD-10-CM

## 2017-06-23 DIAGNOSIS — R29.898 MUSCULAR DECONDITIONING: ICD-10-CM

## 2017-06-23 DIAGNOSIS — I95.2 HYPOTENSION DUE TO DRUGS: ICD-10-CM

## 2017-06-23 DIAGNOSIS — J44.1 COPD EXACERBATION: ICD-10-CM

## 2017-06-23 DIAGNOSIS — E87.1 HYPONATREMIA: ICD-10-CM

## 2017-06-23 DIAGNOSIS — I27.20 PULMONARY HYPERTENSION: ICD-10-CM

## 2017-06-23 DIAGNOSIS — I51.89 DIASTOLIC DYSFUNCTION: ICD-10-CM

## 2017-06-23 DIAGNOSIS — J96.21 ACUTE ON CHRONIC RESPIRATORY FAILURE WITH HYPOXIA: ICD-10-CM

## 2017-06-23 DIAGNOSIS — R19.7 DIARRHEA, UNSPECIFIED TYPE: ICD-10-CM

## 2017-06-23 DIAGNOSIS — J18.9 PNEUMONIA OF RIGHT LUNG DUE TO INFECTIOUS ORGANISM, UNSPECIFIED PART OF LUNG: Primary | ICD-10-CM

## 2017-06-23 DIAGNOSIS — I95.9 SEPSIS WITH HYPOTENSION: ICD-10-CM

## 2017-06-23 DIAGNOSIS — R06.02 SOB (SHORTNESS OF BREATH): ICD-10-CM

## 2017-06-23 DIAGNOSIS — I95.9 HYPOTENSION: ICD-10-CM

## 2017-06-23 DIAGNOSIS — T50.901A MEDICATION OVERDOSE, ACCIDENTAL OR UNINTENTIONAL, INITIAL ENCOUNTER: ICD-10-CM

## 2017-06-23 DIAGNOSIS — F10.10 ALCOHOL ABUSE: ICD-10-CM

## 2017-06-23 DIAGNOSIS — I95.9 HYPOTENSION, UNSPECIFIED HYPOTENSION TYPE: ICD-10-CM

## 2017-06-23 DIAGNOSIS — R77.8 ELEVATED TOTAL PROTEIN: ICD-10-CM

## 2017-06-23 DIAGNOSIS — E86.9 VOLUME DEPLETION: ICD-10-CM

## 2017-06-23 DIAGNOSIS — E87.6 HYPOKALEMIA: ICD-10-CM

## 2017-06-23 DIAGNOSIS — N17.9 AKI (ACUTE KIDNEY INJURY): ICD-10-CM

## 2017-06-23 DIAGNOSIS — A41.9 SEPSIS WITH HYPOTENSION: ICD-10-CM

## 2017-06-23 LAB
ABO + RH BLD: NORMAL
ALBUMIN SERPL BCP-MCNC: 2 G/DL
ALP SERPL-CCNC: 93 U/L
ALT SERPL W/O P-5'-P-CCNC: 14 U/L
ANION GAP SERPL CALC-SCNC: 13 MMOL/L
APTT BLDCRRT: 33 SEC
AST SERPL-CCNC: 22 U/L
BASOPHILS # BLD AUTO: 0.1 K/UL
BASOPHILS NFR BLD: 0.5 %
BILIRUB SERPL-MCNC: 1.3 MG/DL
BILIRUB UR QL STRIP: NEGATIVE
BLD GP AB SCN CELLS X3 SERPL QL: NORMAL
BNP SERPL-MCNC: 507 PG/ML
BUN SERPL-MCNC: 56 MG/DL
CALCIUM SERPL-MCNC: 9 MG/DL
CHLORIDE SERPL-SCNC: 98 MMOL/L
CLARITY UR: ABNORMAL
CO2 SERPL-SCNC: 22 MMOL/L
COLOR UR: ABNORMAL
CORTIS SERPL-MCNC: 23.8 UG/DL
CREAT SERPL-MCNC: 1.9 MG/DL
CREAT UR-MCNC: 205.9 MG/DL
DIFFERENTIAL METHOD: ABNORMAL
EOSINOPHIL # BLD AUTO: 0 K/UL
EOSINOPHIL NFR BLD: 0 %
ERYTHROCYTE [DISTWIDTH] IN BLOOD BY AUTOMATED COUNT: 12.7 %
EST. GFR  (AFRICAN AMERICAN): 44 ML/MIN/1.73 M^2
EST. GFR  (NON AFRICAN AMERICAN): 38 ML/MIN/1.73 M^2
GLUCOSE SERPL-MCNC: 110 MG/DL
GLUCOSE UR QL STRIP: NEGATIVE
HCT VFR BLD AUTO: 39.4 %
HGB BLD-MCNC: 14 G/DL
HGB UR QL STRIP: ABNORMAL
INR PPP: 1.2
KETONES UR QL STRIP: NEGATIVE
LACTATE SERPL-SCNC: 1.6 MMOL/L
LEUKOCYTE ESTERASE UR QL STRIP: NEGATIVE
LIPASE SERPL-CCNC: 20 U/L
LYMPHOCYTES # BLD AUTO: 1.5 K/UL
LYMPHOCYTES NFR BLD: 8 %
MAGNESIUM SERPL-MCNC: 1.7 MG/DL
MCH RBC QN AUTO: 33 PG
MCHC RBC AUTO-ENTMCNC: 35.5 %
MCV RBC AUTO: 93 FL
MONOCYTES # BLD AUTO: 3.6 K/UL
MONOCYTES NFR BLD: 19.1 %
NEUTROPHILS # BLD AUTO: 13.4 K/UL
NEUTROPHILS NFR BLD: 72.4 %
NITRITE UR QL STRIP: NEGATIVE
PH UR STRIP: 6 [PH] (ref 5–8)
PHOSPHATE SERPL-MCNC: 5.2 MG/DL
PLATELET # BLD AUTO: 319 K/UL
PLATELET BLD QL SMEAR: ABNORMAL
PMV BLD AUTO: 10.1 FL
POTASSIUM SERPL-SCNC: 3.1 MMOL/L
PROCALCITONIN SERPL IA-MCNC: 4.47 NG/ML
PROT SERPL-MCNC: 7.2 G/DL
PROT UR QL STRIP: ABNORMAL
PROTHROMBIN TIME: 12.4 SEC
RBC # BLD AUTO: 4.24 M/UL
SODIUM SERPL-SCNC: 133 MMOL/L
SODIUM UR-SCNC: <20 MMOL/L
SP GR UR STRIP: 1.02 (ref 1–1.03)
TROPONIN I SERPL DL<=0.01 NG/ML-MCNC: 0.01 NG/ML
TSH SERPL DL<=0.005 MIU/L-ACNC: 1.65 UIU/ML
URN SPEC COLLECT METH UR: ABNORMAL
UROBILINOGEN UR STRIP-ACNC: 1 EU/DL
UUN UR-MCNC: 425 MG/DL
WBC # BLD AUTO: 18.93 K/UL

## 2017-06-23 PROCEDURE — 83880 ASSAY OF NATRIURETIC PEPTIDE: CPT

## 2017-06-23 PROCEDURE — 96365 THER/PROPH/DIAG IV INF INIT: CPT

## 2017-06-23 PROCEDURE — 96367 TX/PROPH/DG ADDL SEQ IV INF: CPT

## 2017-06-23 PROCEDURE — 63600175 PHARM REV CODE 636 W HCPCS: Performed by: STUDENT IN AN ORGANIZED HEALTH CARE EDUCATION/TRAINING PROGRAM

## 2017-06-23 PROCEDURE — 87040 BLOOD CULTURE FOR BACTERIA: CPT | Mod: 59

## 2017-06-23 PROCEDURE — 83690 ASSAY OF LIPASE: CPT

## 2017-06-23 PROCEDURE — 93010 ELECTROCARDIOGRAM REPORT: CPT | Mod: 76,,, | Performed by: INTERNAL MEDICINE

## 2017-06-23 PROCEDURE — 25000242 PHARM REV CODE 250 ALT 637 W/ HCPCS: Performed by: NURSE PRACTITIONER

## 2017-06-23 PROCEDURE — 84443 ASSAY THYROID STIM HORMONE: CPT

## 2017-06-23 PROCEDURE — 25000003 PHARM REV CODE 250: Performed by: STUDENT IN AN ORGANIZED HEALTH CARE EDUCATION/TRAINING PROGRAM

## 2017-06-23 PROCEDURE — 96366 THER/PROPH/DIAG IV INF ADDON: CPT

## 2017-06-23 PROCEDURE — 80053 COMPREHEN METABOLIC PANEL: CPT

## 2017-06-23 PROCEDURE — 81003 URINALYSIS AUTO W/O SCOPE: CPT

## 2017-06-23 PROCEDURE — 84484 ASSAY OF TROPONIN QUANT: CPT

## 2017-06-23 PROCEDURE — 86901 BLOOD TYPING SEROLOGIC RH(D): CPT

## 2017-06-23 PROCEDURE — 93005 ELECTROCARDIOGRAM TRACING: CPT

## 2017-06-23 PROCEDURE — 87086 URINE CULTURE/COLONY COUNT: CPT

## 2017-06-23 PROCEDURE — 80074 ACUTE HEPATITIS PANEL: CPT

## 2017-06-23 PROCEDURE — 85025 COMPLETE CBC W/AUTO DIFF WBC: CPT

## 2017-06-23 PROCEDURE — 99285 EMERGENCY DEPT VISIT HI MDM: CPT | Mod: 25

## 2017-06-23 PROCEDURE — 85610 PROTHROMBIN TIME: CPT

## 2017-06-23 PROCEDURE — 96368 THER/DIAG CONCURRENT INF: CPT

## 2017-06-23 PROCEDURE — 82570 ASSAY OF URINE CREATININE: CPT

## 2017-06-23 PROCEDURE — 83735 ASSAY OF MAGNESIUM: CPT

## 2017-06-23 PROCEDURE — 84100 ASSAY OF PHOSPHORUS: CPT

## 2017-06-23 PROCEDURE — 36415 COLL VENOUS BLD VENIPUNCTURE: CPT

## 2017-06-23 PROCEDURE — 63600175 PHARM REV CODE 636 W HCPCS: Performed by: NURSE PRACTITIONER

## 2017-06-23 PROCEDURE — 11000001 HC ACUTE MED/SURG PRIVATE ROOM

## 2017-06-23 PROCEDURE — 86900 BLOOD TYPING SEROLOGIC ABO: CPT

## 2017-06-23 PROCEDURE — 85730 THROMBOPLASTIN TIME PARTIAL: CPT

## 2017-06-23 PROCEDURE — 87449 NOS EACH ORGANISM AG IA: CPT

## 2017-06-23 PROCEDURE — 84145 PROCALCITONIN (PCT): CPT

## 2017-06-23 PROCEDURE — 82533 TOTAL CORTISOL: CPT

## 2017-06-23 PROCEDURE — 84300 ASSAY OF URINE SODIUM: CPT

## 2017-06-23 PROCEDURE — 83605 ASSAY OF LACTIC ACID: CPT

## 2017-06-23 PROCEDURE — 25000003 PHARM REV CODE 250: Performed by: NURSE PRACTITIONER

## 2017-06-23 PROCEDURE — 96361 HYDRATE IV INFUSION ADD-ON: CPT

## 2017-06-23 PROCEDURE — 84540 ASSAY OF URINE/UREA-N: CPT

## 2017-06-23 RX ORDER — GUAIFENESIN/DEXTROMETHORPHAN 100-10MG/5
10 SYRUP ORAL EVERY 4 HOURS PRN
Status: DISCONTINUED | OUTPATIENT
Start: 2017-06-23 | End: 2017-06-26 | Stop reason: HOSPADM

## 2017-06-23 RX ORDER — ACETAMINOPHEN 325 MG/1
650 TABLET ORAL ONCE
Status: COMPLETED | OUTPATIENT
Start: 2017-06-23 | End: 2017-06-23

## 2017-06-23 RX ORDER — GABAPENTIN 300 MG/1
300 CAPSULE ORAL 3 TIMES DAILY
COMMUNITY

## 2017-06-23 RX ORDER — VALSARTAN 160 MG/1
160 TABLET ORAL DAILY
Status: ON HOLD | COMMUNITY
End: 2017-06-26 | Stop reason: HOSPADM

## 2017-06-23 RX ORDER — IPRATROPIUM BROMIDE AND ALBUTEROL SULFATE 2.5; .5 MG/3ML; MG/3ML
3 SOLUTION RESPIRATORY (INHALATION)
Status: DISCONTINUED | OUTPATIENT
Start: 2017-06-23 | End: 2017-06-26 | Stop reason: HOSPADM

## 2017-06-23 RX ORDER — HYDROCODONE BITARTRATE AND ACETAMINOPHEN 10; 325 MG/1; MG/1
1 TABLET ORAL EVERY 6 HOURS PRN
Status: ON HOLD | COMMUNITY
End: 2017-06-26 | Stop reason: HOSPADM

## 2017-06-23 RX ORDER — POTASSIUM CHLORIDE 20 MEQ/1
20 TABLET, EXTENDED RELEASE ORAL
Status: DISPENSED | OUTPATIENT
Start: 2017-06-23 | End: 2017-06-24

## 2017-06-23 RX ORDER — CEFEPIME HYDROCHLORIDE 2 G/50ML
2 INJECTION, SOLUTION INTRAVENOUS
Status: DISCONTINUED | OUTPATIENT
Start: 2017-06-23 | End: 2017-06-25

## 2017-06-23 RX ORDER — IPRATROPIUM BROMIDE AND ALBUTEROL SULFATE 2.5; .5 MG/3ML; MG/3ML
3 SOLUTION RESPIRATORY (INHALATION)
Status: COMPLETED | OUTPATIENT
Start: 2017-06-23 | End: 2017-06-23

## 2017-06-23 RX ORDER — CEFEPIME HYDROCHLORIDE 2 G/50ML
2 INJECTION, SOLUTION INTRAVENOUS
Status: DISCONTINUED | OUTPATIENT
Start: 2017-06-23 | End: 2017-06-23

## 2017-06-23 RX ORDER — HYDROCHLOROTHIAZIDE 12.5 MG/1
12.5 TABLET ORAL DAILY
Status: ON HOLD | COMMUNITY
End: 2017-06-26 | Stop reason: HOSPADM

## 2017-06-23 RX ORDER — METRONIDAZOLE 500 MG/100ML
500 INJECTION, SOLUTION INTRAVENOUS
Status: DISCONTINUED | OUTPATIENT
Start: 2017-06-23 | End: 2017-06-25

## 2017-06-23 RX ORDER — OXYCODONE AND ACETAMINOPHEN 10; 325 MG/1; MG/1
1 TABLET ORAL EVERY 4 HOURS PRN
COMMUNITY
End: 2017-06-23 | Stop reason: ALTCHOICE

## 2017-06-23 RX ORDER — PREDNISONE 20 MG/1
40 TABLET ORAL DAILY
Status: DISCONTINUED | OUTPATIENT
Start: 2017-06-23 | End: 2017-06-26 | Stop reason: HOSPADM

## 2017-06-23 RX ORDER — IPRATROPIUM BROMIDE AND ALBUTEROL SULFATE 2.5; .5 MG/3ML; MG/3ML
3 SOLUTION RESPIRATORY (INHALATION) EVERY 4 HOURS PRN
Status: DISCONTINUED | OUTPATIENT
Start: 2017-06-23 | End: 2017-06-26 | Stop reason: HOSPADM

## 2017-06-23 RX ORDER — CIPROFLOXACIN 2 MG/ML
400 INJECTION, SOLUTION INTRAVENOUS
Status: DISCONTINUED | OUTPATIENT
Start: 2017-06-23 | End: 2017-06-23

## 2017-06-23 RX ORDER — CIPROFLOXACIN 2 MG/ML
400 INJECTION, SOLUTION INTRAVENOUS
Status: COMPLETED | OUTPATIENT
Start: 2017-06-23 | End: 2017-06-23

## 2017-06-23 RX ORDER — CIPROFLOXACIN 2 MG/ML
400 INJECTION, SOLUTION INTRAVENOUS
Status: DISCONTINUED | OUTPATIENT
Start: 2017-06-24 | End: 2017-06-25

## 2017-06-23 RX ORDER — AMLODIPINE BESYLATE 5 MG/1
5 TABLET ORAL DAILY
Status: ON HOLD | COMMUNITY
End: 2017-06-26 | Stop reason: HOSPADM

## 2017-06-23 RX ORDER — HEPARIN SODIUM 5000 [USP'U]/ML
5000 INJECTION, SOLUTION INTRAVENOUS; SUBCUTANEOUS EVERY 8 HOURS
Status: DISCONTINUED | OUTPATIENT
Start: 2017-06-23 | End: 2017-06-26 | Stop reason: HOSPADM

## 2017-06-23 RX ORDER — FLUTICASONE FUROATE AND VILANTEROL 200; 25 UG/1; UG/1
1 POWDER RESPIRATORY (INHALATION) DAILY
Status: DISCONTINUED | OUTPATIENT
Start: 2017-06-24 | End: 2017-06-26 | Stop reason: HOSPADM

## 2017-06-23 RX ORDER — VALSARTAN AND HYDROCHLOROTHIAZIDE 160; 12.5 MG/1; MG/1
1 TABLET, FILM COATED ORAL DAILY
Status: ON HOLD | COMMUNITY
End: 2017-06-26 | Stop reason: HOSPADM

## 2017-06-23 RX ORDER — ONDANSETRON 2 MG/ML
4 INJECTION INTRAMUSCULAR; INTRAVENOUS EVERY 6 HOURS PRN
Status: DISCONTINUED | OUTPATIENT
Start: 2017-06-23 | End: 2017-06-26 | Stop reason: HOSPADM

## 2017-06-23 RX ADMIN — CEFEPIME HYDROCHLORIDE 2 G: 2 INJECTION, SOLUTION INTRAVENOUS at 10:06

## 2017-06-23 RX ADMIN — SODIUM CHLORIDE 1362 ML: 0.9 INJECTION, SOLUTION INTRAVENOUS at 01:06

## 2017-06-23 RX ADMIN — METRONIDAZOLE 500 MG: 500 INJECTION, SOLUTION INTRAVENOUS at 10:06

## 2017-06-23 RX ADMIN — CIPROFLOXACIN 400 MG: 2 INJECTION, SOLUTION INTRAVENOUS at 02:06

## 2017-06-23 RX ADMIN — HEPARIN SODIUM 5000 UNITS: 5000 INJECTION, SOLUTION INTRAVENOUS; SUBCUTANEOUS at 10:06

## 2017-06-23 RX ADMIN — GUAIFENESIN AND DEXTROMETHORPHAN 10 ML: 100; 10 SYRUP ORAL at 10:06

## 2017-06-23 RX ADMIN — SODIUM CHLORIDE 1000 ML: 0.9 INJECTION, SOLUTION INTRAVENOUS at 08:06

## 2017-06-23 RX ADMIN — IPRATROPIUM BROMIDE AND ALBUTEROL SULFATE 3 ML: .5; 3 SOLUTION RESPIRATORY (INHALATION) at 01:06

## 2017-06-23 RX ADMIN — PIPERACILLIN AND TAZOBACTAM 4.5 G: 4; .5 INJECTION, POWDER, LYOPHILIZED, FOR SOLUTION INTRAVENOUS; PARENTERAL at 03:06

## 2017-06-23 RX ADMIN — VANCOMYCIN HYDROCHLORIDE 1000 MG: 1 INJECTION, POWDER, LYOPHILIZED, FOR SOLUTION INTRAVENOUS at 04:06

## 2017-06-23 RX ADMIN — POTASSIUM CHLORIDE 20 MEQ: 20 TABLET, EXTENDED RELEASE ORAL at 10:06

## 2017-06-23 RX ADMIN — ACETAMINOPHEN 650 MG: 325 TABLET ORAL at 10:06

## 2017-06-23 NOTE — ED PROVIDER NOTES
Encounter Date: 6/23/2017       History     Chief Complaint   Patient presents with    Cough     cough and diarrhea for one week     56-year-old male with a past medical history of COPD and alcohol abuse is here for a cough.  The patient reports that he was recently admitted and discharged from [facility], but he does not know the reason why he was admitted.  The patient reports that he has had a productive cough for about a week.  He reports the sputum is brown.  He denies hemoptysis, chest pain, and vomiting.  He reports that he has also had more than 10 episodes of diarrhea per day for about one week along with chills and sweats.  He reports the diarrhea is watery.  He denies abdominal pain.  He reports decreased oral intake due to fatigue.  He also reports that recently, his doctor changed his medications.  He has been taking HCTZ and Norvasc for about one week.  He is unsure if he has been on antibiotics recently.  He reports that he is usually on home oxygen at 2 L/m.      The history is provided by the patient.   Cough   This is a recurrent problem. The current episode started several days ago. The problem occurs constantly. The problem has been unchanged. The cough is productive of brown sputum. Associated symptoms include chills, sweats, rhinorrhea, myalgias, shortness of breath and wheezing. Pertinent negatives include no chest pain, no ear pain, no headaches and no sore throat. He has tried nothing for the symptoms. He is a smoker. His past medical history is significant for COPD.     Review of patient's allergies indicates:  No Known Allergies  Past Medical History:   Diagnosis Date    COPD (chronic obstructive pulmonary disease)     ETOH abuse      Past Surgical History:   Procedure Laterality Date    CHEST TUBE INSERTION  November 2013     History reviewed. No pertinent family history.  Social History   Substance Use Topics    Smoking status: Current Every Day Smoker     Packs/day:  1.00     Types: Cigarettes    Smokeless tobacco: Not on file    Alcohol use Yes      Comment: 2 pints of vodka last night     Review of Systems   Constitutional: Positive for activity change, appetite change, chills and fatigue. Negative for fever.   HENT: Positive for congestion and rhinorrhea. Negative for ear pain and sore throat.    Respiratory: Positive for cough, shortness of breath and wheezing.    Cardiovascular: Negative for chest pain and palpitations.   Gastrointestinal: Positive for diarrhea and nausea. Negative for abdominal pain and vomiting.   Genitourinary: Negative for difficulty urinating.   Musculoskeletal: Positive for myalgias.   Skin: Negative for rash.   Allergic/Immunologic: Negative for immunocompromised state.   Neurological: Positive for weakness. Negative for headaches.   Psychiatric/Behavioral: Negative for confusion.       Physical Exam     Initial Vitals [06/23/17 1204]   BP Pulse Resp Temp SpO2   (!) 70/43 102 (!) 22 97.4 °F (36.3 °C) 96 %      MAP       52         Physical Exam    Nursing note and vitals reviewed.  Constitutional: He appears well-developed and well-nourished. He is active and cooperative. He is easily aroused.  Non-toxic appearance. He appears ill. No distress.   The patient appears older than stated age.   HENT:   Head: Normocephalic and atraumatic.   Nose: Rhinorrhea present. No mucosal edema.   Mouth/Throat: Uvula is midline and oropharynx is clear and moist. Mucous membranes are dry.   Eyes: Conjunctivae are normal.   Neck: Normal range of motion.   Cardiovascular: Normal rate and regular rhythm.   Pulmonary/Chest: Accessory muscle usage present. He has decreased breath sounds. He has wheezes.   Pt on 2LMP NC   Abdominal: Soft. Normal appearance and bowel sounds are normal. He exhibits no distension and no mass. There is no tenderness. There is no rigidity, no rebound, no guarding and no CVA tenderness.   Neurological: He is alert, oriented to person, place,  and time and easily aroused. GCS eye subscore is 4. GCS verbal subscore is 5. GCS motor subscore is 6.   Skin: Skin is warm, dry and intact. No rash noted.   Psychiatric: He has a normal mood and affect. His speech is normal and behavior is normal. Judgment and thought content normal. Cognition and memory are normal.         ED Course   Procedures  Labs Reviewed   APTT - Abnormal; Notable for the following:        Result Value    aPTT 33.0 (*)     All other components within normal limits   B-TYPE NATRIURETIC PEPTIDE - Abnormal; Notable for the following:      (*)     All other components within normal limits   CBC W/ AUTO DIFFERENTIAL - Abnormal; Notable for the following:     WBC 18.93 (*)     RBC 4.24 (*)     Hematocrit 39.4 (*)     MCH 33.0 (*)     Gran # 13.4 (*)     Mono # 3.6 (*)     Lymph% 8.0 (*)     Mono% 19.1 (*)     All other components within normal limits   COMPREHENSIVE METABOLIC PANEL - Abnormal; Notable for the following:     Sodium 133 (*)     Potassium 3.1 (*)     CO2 22 (*)     BUN, Bld 56 (*)     Creatinine 1.9 (*)     Albumin 2.0 (*)     Total Bilirubin 1.3 (*)     eGFR if  44 (*)     eGFR if non  38 (*)     All other components within normal limits   PHOSPHORUS - Abnormal; Notable for the following:     Phosphorus 5.2 (*)     All other components within normal limits   URINALYSIS - Abnormal; Notable for the following:     Color, UA Orange (*)     Appearance, UA Hazy (*)     Protein, UA Trace (*)     Occult Blood UA Trace (*)     All other components within normal limits   CULTURE, BLOOD   CULTURE, BLOOD   CULTURE, URINE   LACTIC ACID, PLASMA   LIPASE   MAGNESIUM   PROTIME-INR   TROPONIN I   TSH   CORTISOL, RANDOM   PROCALCITONIN   TYPE & SCREEN        Imaging Results          X-Ray Chest AP Portable (Final result)  Result time 06/23/17 13:19:36    Final result by Claus Beauchamp MD (06/23/17 13:19:36)                 Impression:     Pneumonitis  superimposed on severe chronic lung disease right hemithorax.      Electronically signed by: MARISSA FRANCO MD  Date:     06/23/17  Time:    13:19              Narrative:    Single view chest, comparison 03/20/2017 and studies 2010.  New increasing patchy infiltrates right mid and lower lungs perihilar superimposed on fibrotic emphysema bled disease right chest.  Linear scar in left lung, tiny density lateral left midlung zone stable.  Heart normal.                                 Medical Decision Making:   Initial Assessment:   56-year-old male here for cough and diarrhea.  The patient was recently admitted at another facility, but he is unsure why he was admitted.  He reports that he has been taking double the dosage of his HCTZ due to confusion.  The patient appears ill but nontoxic.  Heart rate tachycardic, and the patient is hypotensive.  Patient is afebrile.  Mucous membranes dry.  Nose rhinorrhea.  Heart rate tachycardic.  Lungs with diffuse inspiratory and expiratory wheezing.  Decreased breath sounds bilaterally.  Abdomen soft, nontender.  Differential Diagnosis:   Sepsis, pneumonia, COPD exacerbation, dehydration, electrolyte derangement, STEMI, Nstemi, arrhythmia, hypoxia  Clinical Tests:   Lab Tests: Ordered and Reviewed  Radiological Study: Ordered and Reviewed  Medical Tests: Ordered and Reviewed  ED Management:  Sepsis protocol, IV vancomycin, IV Cipro, IV Zosyn, Duoneb  The patient's hypotension could be partially explained by his overdosing of HCTZ.  However, his chest x-ray does reveal pneumonitis and he has an elevated white blood cell count of 18.93.  The pt was turned over to  at 1400.                       ED Course     Clinical Impression:   The primary encounter diagnosis was Pneumonia of right lung due to infectious organism, unspecified part of lung. Diagnoses of SOB (shortness of breath), Hypotension, unspecified hypotension type, and Medication overdose, accidental or  unintentional, initial encounter were also pertinent to this visit.                           Teri Perez, JOYCE  06/23/17 1606       Teri Perez, JOYCE  06/23/17 [id number]

## 2017-06-23 NOTE — H&P
[facility] Internal Medicine History and Physical - Resident Note    Admitting Team: [facility] Hospitalists Team A  Attending Physician: Tonya  Resident: Chip  Interns: [email or web address]    Date of Admit: 6/23/2017    Chief Complaint   Cough and SOB for 3 weeks    Subjective:      History of Present Illness:  Christiano Baer is a 56 y.o. AA male who  has a past medical history of COPD (chronic obstructive pulmonary disease) and ETOH abuse.. The patient presented to [facility] on 6/23/2017 with a primary complaint of Cough (cough and diarrhea for one week)    The patient was in their usual state of health until 3 days after he was discharged from [facility] on 5/29 for an admission for COPD. He reports that 3 days after this he began having a nagging cough productive of brown sputum and SOB worsened from baseline. At baseline he reports being able to walk a quarter block which is largely unchanged. This continued to persist and worsened slightly over the next few weeks. He also recently established care with a PCP at . He is a poor historian, but it seems he has been taking more BP meds than prescribed. He has Rx for Norvasc, HCTZ, valsartan, and HCTZ/valsartan combo. Pt seems unsure of what he's really taking, and some medication bottles are empty. He also says he has been experiencing subjective fevers/chills, generalized weakness, decreased appetite, and watery diarrhea 2-3 times per day since Monday. He is on 2L NC O2 at home chronically. He denies any CP, belly pain, N/V, HA, or LH.     Past Medical History:  Past Medical History:   Diagnosis Date    COPD (chronic obstructive pulmonary disease)     ETOH abuse        Past Surgical History:  Past Surgical History:   Procedure Laterality Date    CHEST TUBE INSERTION  November 2013       Allergies:  Review of patient's allergies indicates:  No Known Allergies    Home Medications:  Prior to Admission medications    Medication Sig Start Date End Date Taking?  "Authorizing Provider   amlodipine (NORVASC) 5 MG tablet Take 5 mg by mouth once daily.   Yes Historical Provider, MD   gabapentin (NEURONTIN) 300 MG capsule Take 300 mg by mouth 3 (three) times daily.   Yes Historical Provider, MD   hydrochlorothiazide (HYDRODIURIL) 12.5 MG Tab Take 12.5 mg by mouth once daily.   Yes Historical Provider, MD   hydrocodone-acetaminophen 10-325mg (NORCO)  mg Tab Take 1 tablet by mouth every 6 (six) hours as needed for Pain.   Yes Historical Provider, MD   mometasone-formoterol (DULERA) 200-5 mcg/actuation inhaler Inhale 2 puffs into the lungs 2 (two) times daily. Controller   Yes Historical Provider, MD   valsartan (DIOVAN) 160 MG tablet Take 160 mg by mouth once daily.   Yes Historical Provider, MD   valsartan-hydrochlorothiazide (DIOVAN-HCT) 160-12.5 mg per tablet Take 1 tablet by mouth once daily.   Yes Historical Provider, MD       Family History:  History reviewed. No pertinent family history.    Social History:  Social History   Substance Use Topics    Smoking status: Current Every Day Smoker     Packs/day: 1.00     Types: Cigarettes    Smokeless tobacco: Not on file    Alcohol use Yes      Comment: 2 pints of vodka last night       Review of Systems:  Pertinent items are noted in HPI. All other systems are reviewed and are negative.    Health Maintaince :   Primary Care Physician: Mary Rodriguez at   Immunizations:   TDap is not up to date.  Influenza is not up to date.  Pneumovax is not up to date.  Cancer Screening:  Colonoscopy: is not up to date.      Objective:   Last 24 Hour Vital Signs:  BP  Min: 70/43  Max: 96/58  Temp  Av.4 °F (36.3 °C)  Min: 97.4 °F (36.3 °C)  Max: 97.4 °F (36.3 °C)  Pulse  Av.4  Min: 28  Max: 179  Resp  Av  Min: 14  Max: 29  SpO2  Av.5 %  Min: 90 %  Max: 98 %  Height  Av' 9" (175.3 cm)  Min: 5' 9" (175.3 cm)  Max: 5' 9" (175.3 cm)  Weight  Av.4 kg (100 lb)  Min: 45.4 kg (100 lb)  Max: 45.4 kg (100 lb)  Body mass " index is 14.77 kg/m².  No intake/output data recorded.    Physical Examination:  General Appearance:    Alert, cooperative, no distress, appears stated age   Head:    Normocephalic, without obvious abnormality, atraumatic   Eyes:    PERRL, conjunctiva/corneas clear, EOM's intact   Nose:   Nares normal, septum midline, mucosa normal, no drainage    or sinus tenderness   Throat:   Lips, mucosa, and tongue normal; poor dentition    Neck:   Supple, symmetrical, trachea midline, no adenopathy;     thyroid:  no enlargement/tenderness/nodules; no carotid    bruit or JVD   Back:     Symmetric, no curvature, ROM normal, no CVA tenderness   Lungs:     Poor air movement but clear to auscultation bilaterally, respirations unlabored on 2L NC   Chest Wall:    No tenderness or deformity    Heart:    Regular rate and rhythm, S1 and S2 normal, no murmur, rub   or gallop   Abdomen:     Soft, non-tender, bowel sounds active all four quadrants,     no masses, no organomegaly   Extremities:   Extremities normal, atraumatic, no cyanosis or edema   Pulses:   2+ and symmetric all extremities   Skin:   Skin color, texture, turgor normal, no rashes or lesions   Neurologic:   CNII-XII intact, normal strength, sensation and reflexes     throughout       Laboratory:  Most Recent Data:  CBC: Lab Results   Component Value Date    WBC 18.93 (H) 06/23/2017    HGB 14.0 06/23/2017    HCT 39.4 (L) 06/23/2017     06/23/2017    MCV 93 06/23/2017    RDW 12.7 06/23/2017     BMP: Lab Results   Component Value Date     (L) 06/23/2017    K 3.1 (L) 06/23/2017    CL 98 06/23/2017    CO2 22 (L) 06/23/2017    BUN 56 (H) 06/23/2017    CREATININE 1.9 (H) 06/23/2017     06/23/2017    CALCIUM 9.0 06/23/2017    MG 1.7 06/23/2017    PHOS 5.2 (H) 06/23/2017     LFTs: Lab Results   Component Value Date    PROT 7.2 06/23/2017    ALBUMIN 2.0 (L) 06/23/2017    BILITOT 1.3 (H) 06/23/2017    AST 22 06/23/2017    ALKPHOS 93 06/23/2017    ALT 14 06/23/2017      Coags:   Lab Results   Component Value Date    INR 1.2 06/23/2017     FLP: No results found for: CHOL, HDL, LDLCALC, TRIG, CHOLHDL  DM: Lab Results   Component Value Date    CREATININE 1.9 (H) 06/23/2017     Thyroid: Lab Results   Component Value Date    TSH 1.651 06/23/2017     Anemia: No results found for: IRON, TIBC, FERRITIN, PBHQGRQG92, FOLATE  Cardiac: Lab Results   Component Value Date    TROPONINI 0.013 06/23/2017     (H) 06/23/2017     Urinalysis: Lab Results   Component Value Date    COLORU Orange (A) 06/23/2017    SPECGRAV 1.025 06/23/2017    NITRITE Negative 06/23/2017    KETONESU Negative 06/23/2017    UROBILINOGEN 1.0 06/23/2017       Microbiology Data:  Cx data pending    Other Results:  EKG (my interpretation): no acute ischemic findings    Radiology:  Imaging Results          X-Ray Chest AP Portable (Final result)  Result time 06/23/17 13:19:36    Final result by Claus Franco MD (06/23/17 13:19:36)                 Impression:     Pneumonitis superimposed on severe chronic lung disease right hemithorax.      Electronically signed by: MARISSA FRANCO MD  Date:     06/23/17  Time:    13:19              Narrative:    Single view chest, comparison 03/20/2017 and studies 2010.  New increasing patchy infiltrates right mid and lower lungs perihilar superimposed on fibrotic emphysema bled disease right chest.  Linear scar in left lung, tiny density lateral left midlung zone stable.  Heart normal.                               Assessment:     Christiano Baer is a 56 y.o. male with:  Patient Active Problem List    Diagnosis Date Noted    Pneumonia of right lung due to infectious organism 06/23/2017    Sepsis with hypotension 06/23/2017        Plan:   Sepsis with Hypotension 2/2 CAP  -Pt appears to have PNA as source, elevated WBC, tachycardia, tachypnea, low BP-responsive to initial IVF challenge; LA wnl; PCT 4.47  -Will give additional NS IVFs now, encourage PO intake, and watch in  ICU given HoTN, will get CVC and start pressors if needed  -Pan-cultured and started broad spectrum Abx (Vanc, cefepime, Flagyl, Cipro), was recently hospitalized  -Will also hold BP meds and check Echo; will reassess home HTN regimen prior to DC  -PT/OT eval    Acute hypoxic resp failure 2/2 CAP superimposed on COPD  -Continue home NC O2 setting of 2L and adjust as needed  -Continue Breo in place of home LABA/ICS  -Ordered scheduled and PRN Duonebs as well as steroid burst    MARLENA  -No reported hx of CKD, most likely pre-renal, will check urine lytes  -Monitor response to IVFs    Diarrhea  -Seems to be improving, will check c diff, RBCs, and WBCs    Protein gap  -Will check Hep panel and HIV screen    Hypokalemia  -Check Mg, replete PO, and monitor    H/O HTN  -Hold meds as above, may need adjustment prior to DC    Elevated BNP  -, no h/o of HF or Echo in the system  -Check Echo as above    Neuropathic pain  -Hold home Norco and gabapentin given HoTN    Ppx: SQH    Dispo: Monitor overnight in ICU    Code Status:   Full    Gordon Saunders  [facility] HO-2  [facility] IM Service    [facility] Hospitalist Pager numbers:   [facility] Hospitalist Medicine Team A (Yanna/Tonya): [phone]  [facility] Hospitalist Medicine Team B (Kendall/Mata):  [phone]

## 2017-06-23 NOTE — EKG INTERPRETATIONS - EMERGENCY DEPT.
Sinus tachycardia, heart rate 111, much artifact, no ST segment elevation    Repeat EKG at 1750, normal sinus rhythm, heart rate 92, no ST segment elevation, normal QT, FL and axis

## 2017-06-23 NOTE — ED NOTES
Pt continues coughing at this time. Coughing up thin yellow secretions. On cardiac, bp and o2 sat monitor. IV antibiotics infusing. SR up and CB in reach. Denies shortness of breath at this time.

## 2017-06-24 PROBLEM — I95.9 HYPOTENSION: Status: ACTIVE | Noted: 2017-06-24

## 2017-06-24 LAB
ALBUMIN SERPL BCP-MCNC: 1.8 G/DL
ALP SERPL-CCNC: 85 U/L
ALT SERPL W/O P-5'-P-CCNC: 13 U/L
ANION GAP SERPL CALC-SCNC: 10 MMOL/L
AST SERPL-CCNC: 21 U/L
BASOPHILS # BLD AUTO: ABNORMAL K/UL
BASOPHILS NFR BLD: 0 %
BILIRUB SERPL-MCNC: 0.8 MG/DL
BUN SERPL-MCNC: 47 MG/DL
BURR CELLS BLD QL SMEAR: ABNORMAL
CALCIUM SERPL-MCNC: 8.1 MG/DL
CHLORIDE SERPL-SCNC: 107 MMOL/L
CO2 SERPL-SCNC: 19 MMOL/L
CREAT SERPL-MCNC: 1.3 MG/DL
DIFFERENTIAL METHOD: ABNORMAL
EOSINOPHIL # BLD AUTO: ABNORMAL K/UL
EOSINOPHIL NFR BLD: 0 %
ERYTHROCYTE [DISTWIDTH] IN BLOOD BY AUTOMATED COUNT: 12.8 %
EST. GFR  (AFRICAN AMERICAN): >60 ML/MIN/1.73 M^2
EST. GFR  (NON AFRICAN AMERICAN): >60 ML/MIN/1.73 M^2
GLUCOSE SERPL-MCNC: 97 MG/DL
HCT VFR BLD AUTO: 34.7 %
HGB BLD-MCNC: 12.1 G/DL
LYMPHOCYTES # BLD AUTO: ABNORMAL K/UL
LYMPHOCYTES NFR BLD: 3 %
MCH RBC QN AUTO: 32.5 PG
MCHC RBC AUTO-ENTMCNC: 34.9 %
MCV RBC AUTO: 93 FL
MONOCYTES # BLD AUTO: ABNORMAL K/UL
MONOCYTES NFR BLD: 10 %
NEUTROPHILS NFR BLD: 80 %
NEUTS BAND NFR BLD MANUAL: 7 %
PLATELET # BLD AUTO: 315 K/UL
PLATELET BLD QL SMEAR: ABNORMAL
PMV BLD AUTO: 9.8 FL
POIKILOCYTOSIS BLD QL SMEAR: SLIGHT
POTASSIUM SERPL-SCNC: 3.3 MMOL/L
PROT SERPL-MCNC: 6.3 G/DL
RBC # BLD AUTO: 3.72 M/UL
SODIUM SERPL-SCNC: 136 MMOL/L
WBC # BLD AUTO: 16.55 K/UL

## 2017-06-24 PROCEDURE — 25000003 PHARM REV CODE 250: Performed by: STUDENT IN AN ORGANIZED HEALTH CARE EDUCATION/TRAINING PROGRAM

## 2017-06-24 PROCEDURE — 80053 COMPREHEN METABOLIC PANEL: CPT

## 2017-06-24 PROCEDURE — 11000001 HC ACUTE MED/SURG PRIVATE ROOM

## 2017-06-24 PROCEDURE — 94640 AIRWAY INHALATION TREATMENT: CPT

## 2017-06-24 PROCEDURE — 94761 N-INVAS EAR/PLS OXIMETRY MLT: CPT

## 2017-06-24 PROCEDURE — 93306 TTE W/DOPPLER COMPLETE: CPT | Mod: 26,,, | Performed by: INTERNAL MEDICINE

## 2017-06-24 PROCEDURE — 25000242 PHARM REV CODE 250 ALT 637 W/ HCPCS: Performed by: STUDENT IN AN ORGANIZED HEALTH CARE EDUCATION/TRAINING PROGRAM

## 2017-06-24 PROCEDURE — 63600175 PHARM REV CODE 636 W HCPCS: Performed by: STUDENT IN AN ORGANIZED HEALTH CARE EDUCATION/TRAINING PROGRAM

## 2017-06-24 PROCEDURE — 87070 CULTURE OTHR SPECIMN AEROBIC: CPT

## 2017-06-24 PROCEDURE — 86703 HIV-1/HIV-2 1 RESULT ANTBDY: CPT

## 2017-06-24 PROCEDURE — 85007 BL SMEAR W/DIFF WBC COUNT: CPT

## 2017-06-24 PROCEDURE — 36415 COLL VENOUS BLD VENIPUNCTURE: CPT

## 2017-06-24 PROCEDURE — 85027 COMPLETE CBC AUTOMATED: CPT

## 2017-06-24 PROCEDURE — 87147 CULTURE TYPE IMMUNOLOGIC: CPT

## 2017-06-24 PROCEDURE — 87205 SMEAR GRAM STAIN: CPT

## 2017-06-24 PROCEDURE — 93306 TTE W/DOPPLER COMPLETE: CPT

## 2017-06-24 RX ORDER — POTASSIUM CHLORIDE 20 MEQ/1
20 TABLET, EXTENDED RELEASE ORAL
Status: COMPLETED | OUTPATIENT
Start: 2017-06-24 | End: 2017-06-24

## 2017-06-24 RX ORDER — SODIUM CHLORIDE 9 MG/ML
INJECTION, SOLUTION INTRAVENOUS CONTINUOUS
Status: ACTIVE | OUTPATIENT
Start: 2017-06-24 | End: 2017-06-25

## 2017-06-24 RX ORDER — ACETAMINOPHEN 325 MG/1
650 TABLET ORAL ONCE
Status: COMPLETED | OUTPATIENT
Start: 2017-06-24 | End: 2017-06-24

## 2017-06-24 RX ORDER — SODIUM CHLORIDE AND POTASSIUM CHLORIDE 150; 900 MG/100ML; MG/100ML
INJECTION, SOLUTION INTRAVENOUS CONTINUOUS
Status: DISCONTINUED | OUTPATIENT
Start: 2017-06-24 | End: 2017-06-24

## 2017-06-24 RX ORDER — GUAIFENESIN 600 MG/1
600 TABLET, EXTENDED RELEASE ORAL 2 TIMES DAILY
Status: DISCONTINUED | OUTPATIENT
Start: 2017-06-24 | End: 2017-06-26 | Stop reason: HOSPADM

## 2017-06-24 RX ORDER — POTASSIUM CHLORIDE 20 MEQ/1
20 TABLET, EXTENDED RELEASE ORAL
Status: DISCONTINUED | OUTPATIENT
Start: 2017-06-24 | End: 2017-06-24

## 2017-06-24 RX ADMIN — METRONIDAZOLE 500 MG: 500 INJECTION, SOLUTION INTRAVENOUS at 10:06

## 2017-06-24 RX ADMIN — GUAIFENESIN AND DEXTROMETHORPHAN 10 ML: 100; 10 SYRUP ORAL at 08:06

## 2017-06-24 RX ADMIN — METRONIDAZOLE 500 MG: 500 INJECTION, SOLUTION INTRAVENOUS at 06:06

## 2017-06-24 RX ADMIN — VANCOMYCIN HYDROCHLORIDE 750 MG: 750 INJECTION, POWDER, LYOPHILIZED, FOR SOLUTION INTRAVENOUS at 04:06

## 2017-06-24 RX ADMIN — PANTOPRAZOLE SODIUM 600 MG: 40 TABLET, DELAYED RELEASE ORAL at 10:06

## 2017-06-24 RX ADMIN — GUAIFENESIN AND DEXTROMETHORPHAN 10 ML: 100; 10 SYRUP ORAL at 03:06

## 2017-06-24 RX ADMIN — IPRATROPIUM BROMIDE AND ALBUTEROL SULFATE 3 ML: .5; 3 SOLUTION RESPIRATORY (INHALATION) at 04:06

## 2017-06-24 RX ADMIN — HEPARIN SODIUM 5000 UNITS: 5000 INJECTION, SOLUTION INTRAVENOUS; SUBCUTANEOUS at 09:06

## 2017-06-24 RX ADMIN — HEPARIN SODIUM 5000 UNITS: 5000 INJECTION, SOLUTION INTRAVENOUS; SUBCUTANEOUS at 01:06

## 2017-06-24 RX ADMIN — MAGNESIUM SULFATE HEPTAHYDRATE 1 G: 500 INJECTION, SOLUTION INTRAMUSCULAR; INTRAVENOUS at 03:06

## 2017-06-24 RX ADMIN — POTASSIUM CHLORIDE 20 MEQ: 20 TABLET, EXTENDED RELEASE ORAL at 08:06

## 2017-06-24 RX ADMIN — HEPARIN SODIUM 5000 UNITS: 5000 INJECTION, SOLUTION INTRAVENOUS; SUBCUTANEOUS at 06:06

## 2017-06-24 RX ADMIN — IPRATROPIUM BROMIDE AND ALBUTEROL SULFATE 3 ML: .5; 3 SOLUTION RESPIRATORY (INHALATION) at 09:06

## 2017-06-24 RX ADMIN — FLUTICASONE FUROATE AND VILANTEROL TRIFENATATE 1 PUFF: 200; 25 POWDER RESPIRATORY (INHALATION) at 09:06

## 2017-06-24 RX ADMIN — POTASSIUM CHLORIDE 20 MEQ: 20 TABLET, EXTENDED RELEASE ORAL at 11:06

## 2017-06-24 RX ADMIN — SODIUM CHLORIDE: 0.9 INJECTION, SOLUTION INTRAVENOUS at 01:06

## 2017-06-24 RX ADMIN — ACETAMINOPHEN 650 MG: 325 TABLET ORAL at 11:06

## 2017-06-24 RX ADMIN — PANTOPRAZOLE SODIUM 600 MG: 40 TABLET, DELAYED RELEASE ORAL at 09:06

## 2017-06-24 RX ADMIN — METRONIDAZOLE 500 MG: 500 INJECTION, SOLUTION INTRAVENOUS at 03:06

## 2017-06-24 RX ADMIN — CEFEPIME HYDROCHLORIDE 2 G: 2 INJECTION, SOLUTION INTRAVENOUS at 09:06

## 2017-06-24 RX ADMIN — GUAIFENESIN AND DEXTROMETHORPHAN 10 ML: 100; 10 SYRUP ORAL at 07:06

## 2017-06-24 RX ADMIN — POTASSIUM CHLORIDE 20 MEQ: 20 TABLET, EXTENDED RELEASE ORAL at 10:06

## 2017-06-24 RX ADMIN — POTASSIUM CHLORIDE 20 MEQ: 20 TABLET, EXTENDED RELEASE ORAL at 12:06

## 2017-06-24 RX ADMIN — ACETAMINOPHEN 650 MG: 325 TABLET ORAL at 09:06

## 2017-06-24 RX ADMIN — SODIUM CHLORIDE AND POTASSIUM CHLORIDE: 9; 1.49 INJECTION, SOLUTION INTRAVENOUS at 01:06

## 2017-06-24 RX ADMIN — CIPROFLOXACIN 400 MG: 2 INJECTION, SOLUTION INTRAVENOUS at 01:06

## 2017-06-24 RX ADMIN — IPRATROPIUM BROMIDE AND ALBUTEROL SULFATE 3 ML: .5; 3 SOLUTION RESPIRATORY (INHALATION) at 08:06

## 2017-06-24 RX ADMIN — PREDNISONE 40 MG: 20 TABLET ORAL at 08:06

## 2017-06-24 RX ADMIN — IPRATROPIUM BROMIDE AND ALBUTEROL SULFATE 3 ML: .5; 3 SOLUTION RESPIRATORY (INHALATION) at 12:06

## 2017-06-24 NOTE — PLAN OF CARE
VS BP=92/52 ; SL=637 monitor; RR=28; Temp=102.3 Spo2=92 on 2.5 L NC... Patient is coughing excessively and anxious. Nurse notified Dr. Allen . Awaiting for new orders. Continue to monitor

## 2017-06-24 NOTE — PLAN OF CARE
Problem: Patient Care Overview  Goal: Plan of Care Review  Outcome: Ongoing (interventions implemented as appropriate)  Pt on 2  Lpm NC with SpO2  97%. No respiratory distress noted will continue to monitor. Tolerated neb treatment and inhaler well.

## 2017-06-24 NOTE — PLAN OF CARE
Problem: Patient Care Overview  Goal: Plan of Care Review  Outcome: Ongoing (interventions implemented as appropriate)  Sinus tachy - sinus rhythm on monitor. NAD.  VS monitor. 0.9% NaCl with 20 mEq infusing at 150 ml/h. Spo2 > 91 % 2L NC. Commode at bedside. Nurse reviewed the plan of care and instructed patient to call for assistance when needed. Fall prevention agreement obtained. Patient verbalized understanding the plan of care. Room near nursing station, bed alarm set, nonskid yellow socks applied, side rails up x 2, call light within reach. Continue to monitor

## 2017-06-24 NOTE — PROGRESS NOTES
Cefepime dosage adjusted from 2 gm IV q12h to 2 gm IV q24h per hospital dosing protocol for est. CrCl of 27.2 ml/min (SCr 1.9 mg/dL).

## 2017-06-24 NOTE — ED NOTES
[facility] admitting doctors called and notified about elevated heart rate.  EKG being done and doctor stated no further orders at this time and he would input parameter orders for the floor.  Erika FRANCIS notified on the floor.

## 2017-06-24 NOTE — PLAN OF CARE
Problem: Patient Care Overview  Goal: Plan of Care Review  Outcome: Ongoing (interventions implemented as appropriate)  Plan of care reviewed with patient. Patient verbalized complete understanding. Fall/safety/special contact precautions maintained. Slip resistant socks on. Bed in lowest position, locked, call light within reach. Side rails up x's 2. Nurse instructed patient to notify staff for any assistance and pt verbalized complete understanding.     Pt remains on 2L via NC,pt SOB on exertion, neb tx ordered every 4 hrs, IV abx and fluids continued, pt outstanding for stool sample, pt did have 2 BM today, nurse unable to collect due to urine mixed with stool, pt stable,NAD noted ,will continue to monitor pt.  Pt on telemetry, no ectopy or true red alarms noted.

## 2017-06-24 NOTE — PROGRESS NOTES
"[facility] Resident HO-2 Progress Note    Subjective:      Christiano Baer is a 56 y.o. AA male who is being followed by the [facility] IM service at [facility] for sepsis 2/2 CAP.     Pt feels better this AM, SOB is improved, cough is still present but is also improved, feels a bit congested. He denies fever, chills, or CP. He has had one BM since arrival.     Objective:   Last 24 Hour Vital Signs:  BP  Min: 70/43  Max: 141/93  Temp  Av.6 °F (37.6 °C)  Min: 97.4 °F (36.3 °C)  Max: 102.3 °F (39.1 °C)  Pulse  Av.8  Min: 28  Max: 179  Resp  Av.5  Min: 14  Max: 36  SpO2  Av.2 %  Min: 33 %  Max: 100 %  Height  Av' 9" (175.3 cm)  Min: 5' 9" (175.3 cm)  Max: 5' 9" (175.3 cm)  Weight  Av.7 kg (107 lb 5.6 oz)  Min: 45.4 kg (100 lb)  Max: 52 kg (114 lb 11.2 oz)  I/O last 3 completed shifts:  In: 250 [IV Piggyback:250]  Out: 351 [Urine:350; Stool:1]    Physical Examination:  Generall Appearance:  Alert, cooperative, no distress, appears stated age, pleasant   Lungs:  Clear to auscultation bilaterally but air movement still decreased, respirations unlabored    Chest Wall:  No tenderness or deformity    Heart:  Regular rate and rhythm, S1 and S2 normal, no murmur, rub or gallop    Abdomen:  Soft, non-tender, bowel sounds active all four quadrants,   no masses, no organomegaly    Extremities:  Extremities normal, atraumatic, no cyanosis or edema    Pulses:  2+ and symmetric all extremities    Skin:  Skin color, texture, turgor normal, no rashes or lesions         Laboratory:  Laboratory Data Reviewed: yes  Pertinent Findings:  K still low    Microbiology Data Reviewed: yes  Pertinent Findings:  Bl Cxs NGTD, Ur and Resp Cx pend    Other Results:  EKG (my interpretation): sinus tach and some TW flattening    Radiology Data Reviewed: yes  Pertinent Findings:  No new    Current Medications:     Infusions:   0/9% NACL & POTASSIUM CHLORIDE 20 MEQ/L 150 mL/hr at 17 0154        " Scheduled:   albuterol-ipratropium 2.5mg-0.5mg/3mL  3 mL Nebulization Q4H WAKE    ceFEPime (MAXIPIME) IVPB  2 g Intravenous Q24H    ciprofloxacin  400 mg Intravenous Q24H    fluticasone-vilanterol  1 puff Inhalation Daily    heparin (porcine)  5,000 Units Subcutaneous Q8H    metronidazole  500 mg Intravenous Q8H    potassium chloride  20 mEq Oral Q2H    predniSONE  40 mg Oral Daily    vancomycin (VANCOCIN) IVPB  15 mg/kg Intravenous Q24H        PRN:  albuterol-ipratropium 2.5mg-0.5mg/3mL, dextromethorphan-guaifenesin  mg/5 ml, ondansetron    Antibiotics and Day Number of Therapy:  Vanc/cefepime/Flagyl/Cipro    Lines and Day Number of Therapy:  PIV    Assessment:     Christiano Baer is a 56 y.o.male with  Patient Active Problem List    Diagnosis Date Noted    Pneumonia of right lung due to infectious organism 06/23/2017    Sepsis with hypotension 06/23/2017    Acute on chronic respiratory failure with hypoxia 06/23/2017    COPD exacerbation 06/23/2017    MARLENA (acute kidney injury) 06/23/2017    Hypokalemia 06/23/2017    Hyponatremia 06/23/2017    Diarrhea 06/23/2017    Elevated total protein 06/23/2017    Muscular deconditioning 06/23/2017    Tobacco abuse 06/23/2017    Alcohol abuse 06/23/2017    Volume depletion 06/23/2017        Plan:   Sepsis with Hypotension 2/2 CAP  -Pt appears to have PNA as source, elevated WBC, tachycardia, tachypnea, low BP-responsive to initial IVF challenge; LA wnl; PCT 4.47  -Given 30 cc/kg bolus in ED, and we ordered additional liter with improvement in BP  -Monitoring on tele on floor, BPs still 90s/60s, given mIVFs overnight, will encourage PO intake and re bolus if needed  -Pan-cultured and started broad spectrum Abx (Vanc, cefepime, Flagyl, Cipro), was recently hospitalized; can likely de-escalate to Avelox  -Will also hold BP meds and check Echo  -PT/OT eval     Acute hypoxic resp failure 2/2 CAP superimposed on COPD  -Continue home NC O2 setting of 2L  and adjust as needed  -Continue Breo in place of home LABA/ICS  -Ordered scheduled and PRN Duonebs as well as steroid burst  -Order Mucinex     MARLENA (improving)  -No reported hx of CKD, most likely pre-renal, will check urine lytes  -Monitor response to IVFs, Cr improved back near baseline of 0.9, FENa of 0.1%     Diarrhea  -Seems to be improving, will check c diff, RBCs, and WBCs if persists     Protein gap  -Will check Hep panel and HIV screen, pending     Hypokalemia  -Checked Mg, replete PO as needed     H/O HTN  -Hold meds as above, may need adjustment prior to DC     Elevated BNP  -, no h/o of HF or Echo in the system  -Check Echo as above     Neuropathic pain  -Hold home Norco and gabapentin given HoTN     Ppx: SQH     Dispo: Monitor overnight in ICU       Evan Saunders  [facility] Internal Medicine HO-2  [facility] IM Service Team A    [facility] Medicine Hospitalist Pager numbers:   [facility] Hospitalist Medicine Team A (Yanna/Tonya): [phone]  [facility] Hospitalist Medicine Team B (Kendall/Mata):  [phone]

## 2017-06-24 NOTE — PROGRESS NOTES
Ciprofloxacin dosage adjusted from 400 mg IV q12h to 400 mg IV q24h per hospital dosing protocol for est. CrCl of 27.2 ml/min (SCr 1.9 mg/dL).

## 2017-06-24 NOTE — PLAN OF CARE
notified pt with c/o of headache and generalized body pain, no PRN pain meds noted for pt, no new orders given at this time.

## 2017-06-24 NOTE — PLAN OF CARE
06/24/17 1110   Discharge Assessment   Assessment Type Discharge Planning Assessment   Confirmed/corrected address and phone number on facesheet? Yes   Assessment information obtained from? Patient   Prior to hospitilization cognitive status: Alert/Oriented   Prior to hospitalization functional status: Independent   Current cognitive status: Alert/Oriented   Current Functional Status: Independent   Arrived From home or self-care   Lives With child(jenn), adult;other (see comments)  (Pt's stepson and nephew live with him)   Able to Return to Prior Arrangements yes   Is patient able to care for self after discharge? Yes   How many people do you have in your home that can help with your care after discharge? 2   Who are your caregiver(s) and their phone number(s)? Christiano Olsen(brother in law)[phone]   Patient's perception of discharge disposition home or selfcare   Readmission Within The Last 30 Days no previous admission in last 30 days   Patient currently being followed by outpatient case management? No   Patient currently receives home health services? No   Does the patient currently use HME? Yes   Patient currently receives private duty nursing? No   Patient currently receives any other outside agency services? No   Equipment Currently Used at Home oxygen   Do you have any problems affording any of your prescribed medications? No   Is the patient taking medications as prescribed? yes   Do you have any financial concerns preventing you from receiving the healthcare you need? No   Does the patient have transportation to healthcare appointments? Yes   Transportation Available family or friend will provide;public transportation   On Dialysis? No   Does the patient receive services at the [facility]mad[facility] Clinic? No   Are there any open cases? No   Discharge Plan A Home with family   Discharge Plan B Home Health   Patient/Family In Agreement With Plan yes        06/24/17 1110   Discharge Assessment   Assessment Type  Discharge Planning Assessment   Confirmed/corrected address and phone number on facesheet? Yes   Assessment information obtained from? Patient   Prior to hospitilization cognitive status: Alert/Oriented   Prior to hospitalization functional status: Independent   Current cognitive status: Alert/Oriented   Current Functional Status: Independent   Arrived From home or self-care   Lives With child(jenn), adult;other (see comments)  (Pt's stepson and nephew live with him)   Able to Return to Prior Arrangements yes   Is patient able to care for self after discharge? Yes   How many people do you have in your home that can help with your care after discharge? 2   Who are your caregiver(s) and their phone number(s)? Christiano Olsen(brother in law)[phone]   Patient's perception of discharge disposition home or selfcare   Readmission Within The Last 30 Days no previous admission in last 30 days   Patient currently being followed by outpatient case management? No   Patient currently receives home health services? No   Does the patient currently use HME? Yes   Patient currently receives private duty nursing? No   Patient currently receives any other outside agency services? No   Equipment Currently Used at Home oxygen   Do you have any problems affording any of your prescribed medications? No   Is the patient taking medications as prescribed? yes   Do you have any financial concerns preventing you from receiving the healthcare you need? No   Does the patient have transportation to healthcare appointments? Yes   Transportation Available family or friend will provide;public transportation   On Dialysis? No   Does the patient receive services at the Coumadin Clinic? No   Are there any open cases? No   Discharge Plan A Home with family   Discharge Plan B Home Health   Patient/Family In Agreement With Plan yes   The pt's PCP is Dr. Alexus Rodriguez

## 2017-06-24 NOTE — PROGRESS NOTES
Vancomycin dosage adjusted from 750 mg IV q12h to 750 mg IV q24h per hospital dosing protocol based on the following parameters:  57 YO male, 45.4 kg, SCr 1.9 mg/dL, est. CrCl 27.2 ml/min, indication: pneumonia. Vancomycin trough level will be checked prior to 4th dose. Pharmacy will monitor.

## 2017-06-25 PROBLEM — I51.89 DIASTOLIC DYSFUNCTION: Status: ACTIVE | Noted: 2017-06-25

## 2017-06-25 PROBLEM — I95.2 HYPOTENSION DUE TO DRUGS: Status: ACTIVE | Noted: 2017-06-25

## 2017-06-25 PROBLEM — I27.20 PULMONARY HYPERTENSION: Status: ACTIVE | Noted: 2017-06-25

## 2017-06-25 LAB
ALBUMIN SERPL BCP-MCNC: 1.9 G/DL
ALP SERPL-CCNC: 82 U/L
ALT SERPL W/O P-5'-P-CCNC: 13 U/L
ANION GAP SERPL CALC-SCNC: 8 MMOL/L
AST SERPL-CCNC: 17 U/L
BACTERIA UR CULT: NORMAL
BASOPHILS # BLD AUTO: 0.03 K/UL
BASOPHILS NFR BLD: 0.2 %
BILIRUB SERPL-MCNC: 0.4 MG/DL
BUN SERPL-MCNC: 28 MG/DL
C DIFF GDH STL QL: NEGATIVE
C DIFF TOX A+B STL QL IA: NEGATIVE
CALCIUM SERPL-MCNC: 8.1 MG/DL
CHLORIDE SERPL-SCNC: 110 MMOL/L
CO2 SERPL-SCNC: 20 MMOL/L
CREAT SERPL-MCNC: 0.9 MG/DL
DIASTOLIC DYSFUNCTION: YES
DIFFERENTIAL METHOD: ABNORMAL
EOSINOPHIL # BLD AUTO: 0 K/UL
EOSINOPHIL NFR BLD: 0 %
ERYTHROCYTE [DISTWIDTH] IN BLOOD BY AUTOMATED COUNT: 13.1 %
EST. GFR  (AFRICAN AMERICAN): >60 ML/MIN/1.73 M^2
EST. GFR  (NON AFRICAN AMERICAN): >60 ML/MIN/1.73 M^2
ESTIMATED PA SYSTOLIC PRESSURE: 44.22
GLUCOSE SERPL-MCNC: 122 MG/DL
HCT VFR BLD AUTO: 35.8 %
HGB BLD-MCNC: 12.2 G/DL
LYMPHOCYTES # BLD AUTO: 1 K/UL
LYMPHOCYTES NFR BLD: 6.7 %
MAGNESIUM SERPL-MCNC: 1.5 MG/DL
MCH RBC QN AUTO: 32.2 PG
MCHC RBC AUTO-ENTMCNC: 34.1 %
MCV RBC AUTO: 95 FL
MITRAL VALVE MOBILITY: NORMAL
MONOCYTES # BLD AUTO: 1.6 K/UL
MONOCYTES NFR BLD: 9.9 %
NEUTROPHILS # BLD AUTO: 12.7 K/UL
NEUTROPHILS NFR BLD: 81.3 %
PLATELET # BLD AUTO: 342 K/UL
PMV BLD AUTO: 10 FL
POTASSIUM SERPL-SCNC: 4.3 MMOL/L
PROT SERPL-MCNC: 6.1 G/DL
RBC # BLD AUTO: 3.79 M/UL
RETIRED EF AND QEF - SEE NOTES: 65 (ref 55–65)
SODIUM SERPL-SCNC: 138 MMOL/L
TRICUSPID VALVE REGURGITATION: ABNORMAL
WBC # BLD AUTO: 15.59 K/UL
WBC #/AREA STL HPF: NORMAL /[HPF]

## 2017-06-25 PROCEDURE — 63600175 PHARM REV CODE 636 W HCPCS: Performed by: STUDENT IN AN ORGANIZED HEALTH CARE EDUCATION/TRAINING PROGRAM

## 2017-06-25 PROCEDURE — 36415 COLL VENOUS BLD VENIPUNCTURE: CPT

## 2017-06-25 PROCEDURE — 25000003 PHARM REV CODE 250: Performed by: STUDENT IN AN ORGANIZED HEALTH CARE EDUCATION/TRAINING PROGRAM

## 2017-06-25 PROCEDURE — 27000221 HC OXYGEN, UP TO 24 HOURS

## 2017-06-25 PROCEDURE — 89055 LEUKOCYTE ASSESSMENT FECAL: CPT

## 2017-06-25 PROCEDURE — 83735 ASSAY OF MAGNESIUM: CPT

## 2017-06-25 PROCEDURE — 94640 AIRWAY INHALATION TREATMENT: CPT

## 2017-06-25 PROCEDURE — 85025 COMPLETE CBC W/AUTO DIFF WBC: CPT

## 2017-06-25 PROCEDURE — 63600175 PHARM REV CODE 636 W HCPCS: Performed by: INTERNAL MEDICINE

## 2017-06-25 PROCEDURE — 25000003 PHARM REV CODE 250: Performed by: INTERNAL MEDICINE

## 2017-06-25 PROCEDURE — 25000242 PHARM REV CODE 250 ALT 637 W/ HCPCS: Performed by: STUDENT IN AN ORGANIZED HEALTH CARE EDUCATION/TRAINING PROGRAM

## 2017-06-25 PROCEDURE — 99900035 HC TECH TIME PER 15 MIN (STAT)

## 2017-06-25 PROCEDURE — 97165 OT EVAL LOW COMPLEX 30 MIN: CPT

## 2017-06-25 PROCEDURE — 94761 N-INVAS EAR/PLS OXIMETRY MLT: CPT

## 2017-06-25 PROCEDURE — 87449 NOS EACH ORGANISM AG IA: CPT

## 2017-06-25 PROCEDURE — 80053 COMPREHEN METABOLIC PANEL: CPT

## 2017-06-25 PROCEDURE — 97535 SELF CARE MNGMENT TRAINING: CPT

## 2017-06-25 PROCEDURE — 11000001 HC ACUTE MED/SURG PRIVATE ROOM

## 2017-06-25 RX ORDER — LOPERAMIDE HYDROCHLORIDE 2 MG/1
2 CAPSULE ORAL 4 TIMES DAILY PRN
Status: DISCONTINUED | OUTPATIENT
Start: 2017-06-25 | End: 2017-06-26 | Stop reason: HOSPADM

## 2017-06-25 RX ORDER — CEFEPIME HYDROCHLORIDE 2 G/50ML
2 INJECTION, SOLUTION INTRAVENOUS
Status: DISCONTINUED | OUTPATIENT
Start: 2017-06-25 | End: 2017-06-26

## 2017-06-25 RX ORDER — CIPROFLOXACIN 2 MG/ML
400 INJECTION, SOLUTION INTRAVENOUS
Status: DISCONTINUED | OUTPATIENT
Start: 2017-06-26 | End: 2017-06-26

## 2017-06-25 RX ORDER — ACETAMINOPHEN 500 MG
500 TABLET ORAL ONCE
Status: COMPLETED | OUTPATIENT
Start: 2017-06-25 | End: 2017-06-25

## 2017-06-25 RX ORDER — ACETAMINOPHEN 325 MG/1
650 TABLET ORAL ONCE
Status: COMPLETED | OUTPATIENT
Start: 2017-06-25 | End: 2017-06-25

## 2017-06-25 RX ORDER — MAGNESIUM SULFATE HEPTAHYDRATE 40 MG/ML
2 INJECTION, SOLUTION INTRAVENOUS ONCE
Status: COMPLETED | OUTPATIENT
Start: 2017-06-25 | End: 2017-06-25

## 2017-06-25 RX ADMIN — IPRATROPIUM BROMIDE AND ALBUTEROL SULFATE 3 ML: .5; 3 SOLUTION RESPIRATORY (INHALATION) at 05:06

## 2017-06-25 RX ADMIN — ACETAMINOPHEN 650 MG: 325 TABLET ORAL at 12:06

## 2017-06-25 RX ADMIN — HEPARIN SODIUM 5000 UNITS: 5000 INJECTION, SOLUTION INTRAVENOUS; SUBCUTANEOUS at 06:06

## 2017-06-25 RX ADMIN — VANCOMYCIN HYDROCHLORIDE 1000 MG: 1 INJECTION, POWDER, LYOPHILIZED, FOR SOLUTION INTRAVENOUS at 06:06

## 2017-06-25 RX ADMIN — CEFEPIME HYDROCHLORIDE 2 G: 2 INJECTION, SOLUTION INTRAVENOUS at 05:06

## 2017-06-25 RX ADMIN — PANTOPRAZOLE SODIUM 600 MG: 40 TABLET, DELAYED RELEASE ORAL at 09:06

## 2017-06-25 RX ADMIN — METRONIDAZOLE 500 MG: 500 INJECTION, SOLUTION INTRAVENOUS at 06:06

## 2017-06-25 RX ADMIN — FLUTICASONE FUROATE AND VILANTEROL TRIFENATATE 1 PUFF: 200; 25 POWDER RESPIRATORY (INHALATION) at 12:06

## 2017-06-25 RX ADMIN — ACETAMINOPHEN 500 MG: 500 TABLET ORAL at 06:06

## 2017-06-25 RX ADMIN — CIPROFLOXACIN 400 MG: 2 INJECTION, SOLUTION INTRAVENOUS at 02:06

## 2017-06-25 RX ADMIN — MAGNESIUM SULFATE IN WATER 2 G: 40 INJECTION, SOLUTION INTRAVENOUS at 09:06

## 2017-06-25 RX ADMIN — METRONIDAZOLE 500 MG: 500 INJECTION, SOLUTION INTRAVENOUS at 04:06

## 2017-06-25 RX ADMIN — IPRATROPIUM BROMIDE AND ALBUTEROL SULFATE 3 ML: .5; 3 SOLUTION RESPIRATORY (INHALATION) at 12:06

## 2017-06-25 RX ADMIN — IPRATROPIUM BROMIDE AND ALBUTEROL SULFATE 3 ML: .5; 3 SOLUTION RESPIRATORY (INHALATION) at 10:06

## 2017-06-25 RX ADMIN — PREDNISONE 40 MG: 20 TABLET ORAL at 09:06

## 2017-06-25 RX ADMIN — HEPARIN SODIUM 5000 UNITS: 5000 INJECTION, SOLUTION INTRAVENOUS; SUBCUTANEOUS at 09:06

## 2017-06-25 RX ADMIN — HEPARIN SODIUM 5000 UNITS: 5000 INJECTION, SOLUTION INTRAVENOUS; SUBCUTANEOUS at 02:06

## 2017-06-25 NOTE — PROGRESS NOTES
Vancomycin dosing monitoring  Estimated Creatinine Clearance: 62.9 mL/min (based on Cr of 0.9).  Pt wt 49.7 kg 56 yrs male  Vancomycin 750 mg increased to 1000 mg q24h  Vancomycin trough due 1600 06/26/17  Pharmacy will continue to monitor

## 2017-06-25 NOTE — PLAN OF CARE
"Problem: Sepsis/Septic Shock (Adult)  Intervention: Provide Oxygenation/Ventilation/Perfusion Support  Plan of care reviewed with patient. Normal saline infusing at 100 cc/ hr. IV antibiotics given. Patient still has infrequent non productive coughs. Breath sounds in right lung deminished. Nurse unable to collect stool sample as it was mixed with urine. Patient says that he "cant control one at a time." Will continue to try and recollect. Contact precautions and safety precautions maintained. Bed locked and low, siderails X3, call bell within reach, bed alarm set.         "

## 2017-06-25 NOTE — PROGRESS NOTES
[facility] Internal Medicine Resident HO-1 Progress Note    Subjective:      Pt still having some loose BMs especially after eating. About 2-3 times since yesterday. Also vomited a little this AM after coughing. No abdominal pain. Breathing okay.      Objective:   Last 24 Hour Vital Signs:  BP  Min: 90/57  Max: 111/55  Temp  Av.9 °F (36.6 °C)  Min: 97.6 °F (36.4 °C)  Max: 98.4 °F (36.9 °C)  Pulse  Av.4  Min: 69  Max: 106  Resp  Av.2  Min: 18  Max: 22  SpO2  Av.7 %  Min: 98 %  Max: 100 %  Weight  Av.7 kg (109 lb 9.6 oz)  Min: 49.7 kg (109 lb 9.6 oz)  Max: 49.7 kg (109 lb 9.6 oz)  I/O last 3 completed shifts:  In: 3105 [P.O.:1500; I.V.:1355; IV Piggyback:250]  Out: 1001 [Urine:1000; Stool:1]    Physical Examination:  Gen: Awake and alert, in NAD, sitting on commode  HEENT: NC/AT, EOMI, anicteric sclerae  CV: RRR, normal S1/S2, no murmurs, no S3/S4  Resp: Decreased air movement throughout, no increased WOB, speaking in full sentences  Abd: NABS, soft, NT/ND, no masses  Ext: 2+ distal pulses, no cyanosis or edema  Skin: No rashes or lesions  Neuro: AAOx3, no focal deficits    Laboratory:  Laboratory Data Reviewed: yes    Recent Labs  Lab 17  1253 17  0345 17  0611   WBC 18.93* 16.55* 15.59*   HGB 14.0 12.1* 12.2*   HCT 39.4* 34.7* 35.8*    315 342   * 136 138   K 3.1* 3.3* 4.3   CL 98 107 110   CO2 22* 19* 20*   BUN 56* 47* 28*    97 122*   CALCIUM 9.0 8.1* 8.1*   MG 1.7  --  1.5*   PHOS 5.2*  --   --    PROT 7.2 6.3 6.1   ALBUMIN 2.0* 1.8* 1.9*   BILITOT 1.3* 0.8 0.4   AST 22 21 17   ALKPHOS 93 85 82   ALT 14 13 13   INR 1.2  --   --        Microbiology Data Reviewed: yes  BCx - NGTD  UCx - no significant growth  SCx - many GPC, many GNR, mod Gram neg diplococci    Current Medications:     Infusions:   sodium chloride 0.9% 100 mL/hr at 17 1328        Scheduled:   albuterol-ipratropium 2.5mg-0.5mg/3mL  3 mL Nebulization Q4H WAKE    ceFEPime (MAXIPIME) IVPB   2 g Intravenous Q24H    ciprofloxacin  400 mg Intravenous Q24H    fluticasone-vilanterol  1 puff Inhalation Daily    guaifenesin  600 mg Oral BID    heparin (porcine)  5,000 Units Subcutaneous Q8H    metronidazole  500 mg Intravenous Q8H    predniSONE  40 mg Oral Daily    vancomycin (VANCOCIN) IVPB  15 mg/kg Intravenous Q24H        PRN:  albuterol-ipratropium 2.5mg-0.5mg/3mL, dextromethorphan-guaifenesin  mg/5 ml, ondansetron    Antibiotics and Day Number of Therapy:  Piptazo x1 (6/23)  Vanc 6/23 - present  Cefepime 6/23 - present  Flagyl 6/23 - present  Cipro 6/23 - present    Antibiotics     Start     Stop Route Frequency Ordered    06/24/17 1630  vancomycin 750 mg in dextrose 5 % 250 mL IVPB (ready to mix system)  (Vancomycin IVPB with levels panel)      -- IV Every 24 hours (non-standard times) 06/23/17 2000 06/24/17 1400  ciprofloxacin (CIPRO)400mg/200ml D5W IVPB 400 mg      -- IV Every 24 hours (non-standard times) 06/23/17 1952 06/23/17 2030  metronidazole IVPB 500 mg      -- IV Every 8 hours (non-standard times) 06/23/17 1927 06/23/17 2030  ceFEPIme in dextrose 5% 2 gram/50 mL IVPB 2 g      -- IV Every 24 hours (non-standard times) 06/23/17 1942          Lines and Day Number of Therapy:  PIV x2    Assessment:     Christiano Baer is a 56 y.o.male with  Patient Active Problem List    Diagnosis Date Noted    Hypotension 06/24/2017    Pneumonia of right lung due to infectious organism 06/23/2017    Sepsis with hypotension 06/23/2017    Acute on chronic respiratory failure with hypoxia 06/23/2017    COPD exacerbation 06/23/2017    MARLENA (acute kidney injury) 06/23/2017    Hypokalemia 06/23/2017    Hyponatremia 06/23/2017    Diarrhea 06/23/2017    Elevated total protein 06/23/2017    Muscular deconditioning 06/23/2017    Tobacco abuse 06/23/2017    Alcohol abuse 06/23/2017    Volume depletion 06/23/2017        Plan:     Sepsis with hypotension 2/2 CAP  -Pt appears to have PNA  as source  -Elevated WBC, tachycardia, tachypnea, low BP-responsive to initial IVF challenge; lactate wnl; PCT 4.47  -Given 30 cc/kg bolus in ED, ordered additional liter with improvement in BP  BPs improved with mIVF  -Pan-cultured and started broad spectrum abx (Vanc, cefepime, Flagyl, Cipro), was recently hospitalized  -Can likely de-escalate to Avelox pending culture results  -Holding BP meds in setting of sepsis   -TTE with EF 65%, grade I diastolic dysfunction, RV enlargement, +pHTN (PA pressure 44)  -PT/OT eval     Acute hypoxic resp failure 2/2 CAP superimposed on COPD  -Continue home NC O2 setting of 2L and adjust as needed  -Continue Breo in place of home LABA/ICS  -Ordered scheduled and PRN Duonebs as well as steroid burst, cont Mucinex  -Sputum gram stain with GPC, GNR, Gram neg diplococci  culture pending     MARLENA (resolved)  -No reported h/o CKD, most likely pre-renal, FENa 0.1%  -Cr improved back near baseline of 0.9 with IVF  -Continue to monitor     Diarrhea  -Cdif and stool WBCs pending, not yet collected     Protein gap  -Hep panel and HIV screen pending     Hypokalemia  -Replete K and Mg PRN      HTN  -Hold meds as above, likely needs adjustment of home regimen prior to DC     Elevated BNP  -, no h/o of HF or echo in the system  -TTE as above, normal LVEF, +DD     Neuropathic pain  -Hold home Norco and gabapentin given hypotension      PPx: SQH  Dispo: Pending culture results, clinical improvement    Shaina Mercado  [facility] Internal [facility] HO-1  [facility] Service Team A    [facility] Medicine Hospitalist Pager numbers:   [facility] Hospitalist Medicine Team A (Yanna/Tonya):   [facility] Hospitalist Medicine Team B (Kendall/Mata):  [phone]

## 2017-06-25 NOTE — PLAN OF CARE
Problem: Patient Care Overview  Goal: Plan of Care Review  Outcome: Ongoing (interventions implemented as appropriate)  Pt on  2 Lpm NC with SpO2 100 %. No respiratory distress noted will continue to monitor.   Tolerated aerosol and inhaler well.

## 2017-06-25 NOTE — PLAN OF CARE
Problem: Patient Care Overview  Goal: Plan of Care Review  Outcome: Ongoing (interventions implemented as appropriate)  Plan of care reviewed with patient. Patient verbalized complete understanding. Fall/safety/special contact precautions maintained. Slip resistant socks on. Bed in lowest position, locked, call light within reach. Side rails up x's 2. Nurse instructed patient to notify staff for any assistance and pt verbalized complete understanding.      Pt remains SOB on exertion with nonproductive cough, IV abx and fluids continued, C diff sample collected today and sent, pt stable,NAD noted ,will continue to monitor pt,Pt on telemetry, no ectopy or true red alarms noted.

## 2017-06-26 VITALS
HEIGHT: 69 IN | HEART RATE: 75 BPM | DIASTOLIC BLOOD PRESSURE: 77 MMHG | TEMPERATURE: 98 F | BODY MASS INDEX: 16.96 KG/M2 | RESPIRATION RATE: 16 BRPM | SYSTOLIC BLOOD PRESSURE: 103 MMHG | WEIGHT: 114.5 LBS | OXYGEN SATURATION: 98 %

## 2017-06-26 LAB
ALBUMIN SERPL BCP-MCNC: 1.8 G/DL
ALP SERPL-CCNC: 96 U/L
ALT SERPL W/O P-5'-P-CCNC: 16 U/L
ANION GAP SERPL CALC-SCNC: 8 MMOL/L
AST SERPL-CCNC: 24 U/L
BASOPHILS # BLD AUTO: 0.04 K/UL
BASOPHILS NFR BLD: 0.3 %
BILIRUB SERPL-MCNC: 0.3 MG/DL
BUN SERPL-MCNC: 22 MG/DL
CALCIUM SERPL-MCNC: 8.6 MG/DL
CHLORIDE SERPL-SCNC: 109 MMOL/L
CO2 SERPL-SCNC: 19 MMOL/L
CREAT SERPL-MCNC: 0.9 MG/DL
DIFFERENTIAL METHOD: ABNORMAL
EOSINOPHIL # BLD AUTO: 0 K/UL
EOSINOPHIL NFR BLD: 0 %
ERYTHROCYTE [DISTWIDTH] IN BLOOD BY AUTOMATED COUNT: 13 %
EST. GFR  (AFRICAN AMERICAN): >60 ML/MIN/1.73 M^2
EST. GFR  (NON AFRICAN AMERICAN): >60 ML/MIN/1.73 M^2
GLUCOSE SERPL-MCNC: 151 MG/DL
HAV IGM SERPL QL IA: NEGATIVE
HBV CORE IGM SERPL QL IA: NEGATIVE
HBV SURFACE AG SERPL QL IA: NEGATIVE
HCT VFR BLD AUTO: 34.4 %
HCV AB SERPL QL IA: POSITIVE
HGB BLD-MCNC: 11.8 G/DL
HIV 1+2 AB+HIV1 P24 AG SERPL QL IA: NEGATIVE
L PNEUMO AG UR QL IA: NOT DETECTED
LYMPHOCYTES # BLD AUTO: 1.2 K/UL
LYMPHOCYTES NFR BLD: 7.8 %
MCH RBC QN AUTO: 32.2 PG
MCHC RBC AUTO-ENTMCNC: 34.3 %
MCV RBC AUTO: 94 FL
MONOCYTES # BLD AUTO: 0.9 K/UL
MONOCYTES NFR BLD: 5.9 %
NEUTROPHILS # BLD AUTO: 13.4 K/UL
NEUTROPHILS NFR BLD: 84.2 %
PLATELET # BLD AUTO: 352 K/UL
PMV BLD AUTO: 9.9 FL
POTASSIUM SERPL-SCNC: 3.6 MMOL/L
PROT SERPL-MCNC: 6.1 G/DL
RBC # BLD AUTO: 3.67 M/UL
SODIUM SERPL-SCNC: 136 MMOL/L
WBC # BLD AUTO: 15.87 K/UL

## 2017-06-26 PROCEDURE — 27000221 HC OXYGEN, UP TO 24 HOURS

## 2017-06-26 PROCEDURE — 25000003 PHARM REV CODE 250: Performed by: STUDENT IN AN ORGANIZED HEALTH CARE EDUCATION/TRAINING PROGRAM

## 2017-06-26 PROCEDURE — 94640 AIRWAY INHALATION TREATMENT: CPT

## 2017-06-26 PROCEDURE — 80053 COMPREHEN METABOLIC PANEL: CPT

## 2017-06-26 PROCEDURE — G8988 SELF CARE GOAL STATUS: HCPCS | Mod: CI

## 2017-06-26 PROCEDURE — 63600175 PHARM REV CODE 636 W HCPCS: Performed by: INTERNAL MEDICINE

## 2017-06-26 PROCEDURE — G8978 MOBILITY CURRENT STATUS: HCPCS | Mod: CK

## 2017-06-26 PROCEDURE — 97530 THERAPEUTIC ACTIVITIES: CPT

## 2017-06-26 PROCEDURE — 94761 N-INVAS EAR/PLS OXIMETRY MLT: CPT

## 2017-06-26 PROCEDURE — 63600175 PHARM REV CODE 636 W HCPCS: Performed by: STUDENT IN AN ORGANIZED HEALTH CARE EDUCATION/TRAINING PROGRAM

## 2017-06-26 PROCEDURE — 86580 TB INTRADERMAL TEST: CPT | Performed by: STUDENT IN AN ORGANIZED HEALTH CARE EDUCATION/TRAINING PROGRAM

## 2017-06-26 PROCEDURE — G8980 MOBILITY D/C STATUS: HCPCS | Mod: CK

## 2017-06-26 PROCEDURE — 85025 COMPLETE CBC W/AUTO DIFF WBC: CPT

## 2017-06-26 PROCEDURE — 25000242 PHARM REV CODE 250 ALT 637 W/ HCPCS: Performed by: STUDENT IN AN ORGANIZED HEALTH CARE EDUCATION/TRAINING PROGRAM

## 2017-06-26 PROCEDURE — 36415 COLL VENOUS BLD VENIPUNCTURE: CPT

## 2017-06-26 PROCEDURE — G8979 MOBILITY GOAL STATUS: HCPCS | Mod: CI

## 2017-06-26 PROCEDURE — 97116 GAIT TRAINING THERAPY: CPT

## 2017-06-26 PROCEDURE — G8987 SELF CARE CURRENT STATUS: HCPCS | Mod: CJ

## 2017-06-26 PROCEDURE — 97161 PT EVAL LOW COMPLEX 20 MIN: CPT

## 2017-06-26 RX ORDER — IPRATROPIUM BROMIDE AND ALBUTEROL SULFATE 2.5; .5 MG/3ML; MG/3ML
3 SOLUTION RESPIRATORY (INHALATION) EVERY 6 HOURS PRN
Qty: 3 ML | Refills: 100 | Status: ON HOLD
Start: 2017-06-26 | End: 2017-07-25

## 2017-06-26 RX ORDER — MOXIFLOXACIN HYDROCHLORIDE 400 MG/250ML
400 INJECTION, SOLUTION INTRAVENOUS
Status: DISCONTINUED | OUTPATIENT
Start: 2017-06-26 | End: 2017-06-26 | Stop reason: HOSPADM

## 2017-06-26 RX ORDER — ACETAMINOPHEN 325 MG/1
650 TABLET ORAL ONCE
Status: COMPLETED | OUTPATIENT
Start: 2017-06-26 | End: 2017-06-26

## 2017-06-26 RX ORDER — GUAIFENESIN 600 MG/1
600 TABLET, EXTENDED RELEASE ORAL 2 TIMES DAILY
COMMUNITY
Start: 2017-06-26 | End: 2017-07-03

## 2017-06-26 RX ORDER — L. ACIDOPHILUS/L.BULGARICUS 100MM CELL
1 GRANULES IN PACKET (EA) ORAL 2 TIMES DAILY
Status: DISCONTINUED | OUTPATIENT
Start: 2017-06-26 | End: 2017-06-26 | Stop reason: HOSPADM

## 2017-06-26 RX ORDER — PREDNISONE 20 MG/1
40 TABLET ORAL DAILY
Qty: 4 TABLET | Refills: 0
Start: 2017-06-26 | End: 2017-06-28

## 2017-06-26 RX ORDER — L. ACIDOPHILUS/L.BULGARICUS 100MM CELL
1 GRANULES IN PACKET (EA) ORAL DAILY
COMMUNITY
Start: 2017-06-26

## 2017-06-26 RX ORDER — MOXIFLOXACIN HYDROCHLORIDE 400 MG/1
400 TABLET ORAL DAILY
Qty: 4 TABLET | Refills: 0 | Status: ON HOLD
Start: 2017-06-26 | End: 2017-07-25

## 2017-06-26 RX ADMIN — PREDNISONE 40 MG: 20 TABLET ORAL at 08:06

## 2017-06-26 RX ADMIN — FLUTICASONE FUROATE AND VILANTEROL TRIFENATATE 1 PUFF: 200; 25 POWDER RESPIRATORY (INHALATION) at 07:06

## 2017-06-26 RX ADMIN — CEFEPIME HYDROCHLORIDE 2 G: 2 INJECTION, SOLUTION INTRAVENOUS at 05:06

## 2017-06-26 RX ADMIN — HEPARIN SODIUM 5000 UNITS: 5000 INJECTION, SOLUTION INTRAVENOUS; SUBCUTANEOUS at 05:06

## 2017-06-26 RX ADMIN — MOXIFLOXACIN HYDROCHLORIDE 400 MG: 400 INJECTION, SOLUTION INTRAVENOUS at 10:06

## 2017-06-26 RX ADMIN — IPRATROPIUM BROMIDE AND ALBUTEROL SULFATE 3 ML: .5; 3 SOLUTION RESPIRATORY (INHALATION) at 11:06

## 2017-06-26 RX ADMIN — TUBERCULIN PURIFIED PROTEIN DERIVATIVE 5 UNITS: 5 INJECTION INTRADERMAL at 12:06

## 2017-06-26 RX ADMIN — IPRATROPIUM BROMIDE AND ALBUTEROL SULFATE 3 ML: .5; 3 SOLUTION RESPIRATORY (INHALATION) at 07:06

## 2017-06-26 RX ADMIN — PANTOPRAZOLE SODIUM 600 MG: 40 TABLET, DELAYED RELEASE ORAL at 08:06

## 2017-06-26 RX ADMIN — IPRATROPIUM BROMIDE AND ALBUTEROL SULFATE 3 ML: .5; 3 SOLUTION RESPIRATORY (INHALATION) at 04:06

## 2017-06-26 RX ADMIN — CIPROFLOXACIN 400 MG: 2 INJECTION, SOLUTION INTRAVENOUS at 02:06

## 2017-06-26 RX ADMIN — LACTOBACILLUS ACIDOPHILUS / LACTOBACILLUS BULGARICUS 1 EACH: 100 MILLION CFU STRENGTH GRANULES at 09:06

## 2017-06-26 RX ADMIN — ACETAMINOPHEN 650 MG: 325 TABLET ORAL at 02:06

## 2017-06-26 NOTE — PROGRESS NOTES
The Sw spoke to Peri at [facility] and the pt's clear to arrive. The Sw spoke to the pt's nurse and gave her the contact info to call report along with the copied chart. The pt's in agreement with the d/c plan. The pt signed the pt choice form and the pt placed it in his chart. The Sw called AIDAN w/c [facility]([phone])spoke to [place] and she states will will schedule a  with [facility] to transport the pt about 5:30pm. The Sw informed the pt's nurse of this info. The pt states he will inform his family of his d/c.

## 2017-06-26 NOTE — PROGRESS NOTES
[facility] made phone contact with long term access services and completed a level of care eligibility tool (LOCET) for nursing facility admission.

## 2017-06-26 NOTE — PLAN OF CARE
Rounded on patient. Patient sitting up in bed with oxygen. Patient states he lives at home and on continous oxygen (2l/nc)- patient has 4 children but are not able to help him. Patient willing to go to NH if he can afford it. Explained that he would have to give up his SSI check.     Patient chose [facility].    PT/OT pending. SNF vs [facility]. Discussed with  DAVID Ang.       06/26/17 1045   Discharge Reassessment   Assessment Type Discharge Planning Reassessment   Can the patient answer the patient profile reliably? Yes, cognitively intact   How does the patient rate their overall health at the present time? Fair   Describe the patient's ability to walk at the present time. Walks with the help of equipment   Discharge Plan A Skilled Nursing Facility   Discharge Plan B New Nursing Home placement - care home care facility   Change in patient condition or support system Yes   Explained to the the patient/caregiver why the discharge planned changed: Yes   Involved the patient/caregiver in establishing a new discharge plan: Yes     Juliane [facility], RN, CCM, CMSRN  RN Transition Navigator  [phone]

## 2017-06-26 NOTE — PLAN OF CARE
Problem: Patient Care Overview  Goal: Plan of Care Review  Outcome: Ongoing (interventions implemented as appropriate)  Plan of care discussed with patient and/or family present. Patient and/or family verbalized complete understanding.   Fall precautions maintained. Bed in lowest position, locked, call light within reach, and bed alarm on. Side rails up x's 2 with slip resistant socks on. Nurse instructed patient to notify staff for any assistance and pt verbalized complete understanding.     Patient assisted to clean up with Nino, Remains on telemetry SR. No red alarms. No complaints from patient.

## 2017-06-26 NOTE — PT/OT/SLP EVAL
Physical Therapy  Evaluation/Treatment    Christiano Baer   MRN: [id number]   Admitting Diagnosis: Sepsis with hypotension    PT Received On: 06/26/17  PT Start Time: 1115     PT Stop Time: 1158    PT Total Time (min): 43 min       Billable Minutes:  Evaluation 15 minutes, Gait [id number] minutes and Therapeutic Activity 16 minutes    Diagnosis: Sepsis with hypotension  The primary encounter diagnosis was Pneumonia of right lung due to infectious organism, unspecified part of lung. Diagnoses of SOB (shortness of breath), Hypotension, unspecified hypotension type, Medication overdose, accidental or unintentional, initial encounter, Hypotension, Sepsis with hypotension, Acute on chronic respiratory failure with hypoxia, COPD exacerbation, MARLENA (acute kidney injury), Diarrhea, unspecified type, Alcohol abuse, Elevated total protein, Hypokalemia, Hyponatremia, Hypotension due to drugs, Muscular deconditioning, Tobacco abuse, Volume depletion, Diastolic dysfunction, and Pulmonary hypertension were also pertinent to this visit.      Past Medical History:   Diagnosis Date    COPD (chronic obstructive pulmonary disease)     ETOH abuse       Past Surgical History:   Procedure Laterality Date    CHEST TUBE INSERTION  November 2013       General Precautions: Standard, fall  Orthopedic Precautions: N/A   Braces: N/A       Do you have any cultural, spiritual, Quaker conflicts, given your current situation?: none    Patient History:  Living Environment Comment: Pt lives with step-son and nephew in 2nd floor apartment with 10-12 steps and R side HR with tub/shower combo; (I)PLOF with ADLs and amb without DME-uses O2 at all times, uses public transportation; dtr works and nephew/step son don not assist the pt.  Equipment Currently Used at Home: oxygen      Previous Level of Function:  Ambulation Skills: independent  Transfer Skills: independent  ADL Skills: independent  Work/Leisure Activity:  "independent    Subjective:  Communicated with nurse prior to session.  "I want my own apartment." Pt expressed interest in assisted living after speaking to his dtr.; "I've been laying in the bed too long."  Chief Complaint: complaining of his living situation  Patient goals: to live in his own apt by himself    Pain/Comfort  Pain Rating 1: 0/10  Pain Rating Post-Intervention 1: 0/10      Objective:   Patient found with: peripheral IV, telemetry, oxygen     Cognitive Exam:  Oriented to: Person, Place, Time and Situation    Follows Commands/attention: Easily distracted, Follows one-step commands and Follows two-step commands  Communication: clear/fluent but appears to be very anxious about his living situation-very talkative  Safety awareness/insight to disability: impaired    Physical Exam:  Postural examination/scapula alignment: Rounded shoulder and Head forward    Skin integrity: Visible skin intact  Edema: None noted     Sensation:   Intact      Lower Extremity Range of Motion:  Right Lower Extremity: WFL  Left Lower Extremity: WFL    Lower Extremity Strength:  Right Lower Extremity: 4+ to 5/5 grossly  Left Lower Extremity: 4+ to 5/5 grossly       Gross motor coordination: WFL    Functional Mobility:  Bed Mobility:  Rolling/Turning to Left: Modified independent  Rolling/Turning Right: Modified independent  Scooting/Bridging: Modified Independent  Supine to Sit: Modified Independent  Sit to Supine: Modified Independent    Transfers:  Sit <> Stand Assistance: Supervision, Stand By Assistance  Sit <> Stand Assistive Device: No Assistive Device    Gait:   Gait Distance: 150ft with O2 sats decreasing to 81% and recovered with deep breathing ~3-5 minutes- needs VCs to stop talking and concentrate on breathing and pacing self with shorter amb distances  Assistance 1: Supervision, Stand by Assistance  Gait Assistive Device: No device  Gait Pattern: reciprocal  Gait Deviation(s):  (slight deviation in balance toward end " of amb )    Balance:   Static Sit: GOOD: Takes MODERATE challenges from all directions  Dynamic Sit: GOOD: Maintains balance through MODERATE excursions of active trunk movement  Static Stand: GOOD-: Takes MODERATE challenges from all directions inconsistently  Dynamic stand: GOOD: Needs SUPERVISION only during gait and able to self right with moderate     Therapeutic Activities and Exercises:  Instructed pt in deep breathing during activities/amb along with pacing self; pt somewhat impulsive in his movements; increased SOB with donning/doffing socks with O2 sats decreasing to 87%; performed sit<>stand 5x with VCs for deep breathing; instructed pt in pacing self with stair climbing with deep breathing, only going up a few at a time and pausing for rest but the pt states that when the lights are out you have to do them all and get to the top or get to the bottom; pt able to perform first 2 stages of 4 stage static balance test but not 3 and 4; able to walk backwards, toe walk and heel walk x 6ft; tolerated amb as above with decreased O2 sats to 81% but believes that he can walk anywhere without any problem-would not consider rollator for energy conservation when he needs to sit; pt advised on bed ex while not walking.    AM-PAC 6 CLICK MOBILITY  How much help from another person does this patient currently need?   1 = Unable, Total/Dependent Assistance  2 = A lot, Maximum/Moderate Assistance  3 = A little, Minimum/Contact Guard/Supervision  4 = None, Modified [place]/Independent    Turning over in bed (including adjusting bedclothes, sheets and blankets)?: 4  Sitting down on and standing up from a chair with arms (e.g., wheelchair, bedside commode, etc.): 3  Moving from lying on back to sitting on the side of the bed?: 3  Moving to and from a bed to a chair (including a wheelchair)?: 3  Need to walk in hospital room?: 3  Climbing 3-5 steps with a railing?: 3  Total Score: 19     AM-PAC Raw Score CMS G-Code  Modifier Level of Impairment Assistance   6 % Total / Unable   7 - 9 CM 80 - 100% Maximal Assist   10 - 14 CL 60 - 80% Moderate Assist   15 - 19 CK 40 - 60% Moderate Assist   20 - 22 CJ 20 - 40% Minimal Assist   23 CI 1-20% SBA / CGA   24 CH 0% Independent/ Mod I     Patient left HOB elevated with all lines intact, call button in reach, bed alarm on and nurse notified.    Assessment:   Christiano Baer is a 56 y.o. male with a medical diagnosis of Sepsis with hypotension   Patient exhibits SOB with very minimal exertion; pt is basically [place] with bed mobility and SBA/supervision for transfers and amb with O2 tank in tow; desatted to 81% on 2L and recovered within 3-5 min with VCs for deep breathing; recommend continued reinforcement of pacing self with activities; pt is not receptive to rollator with seat so that the pt can rest when needed; he has decreased safety awareness in understanding his condition and limitations, therefore, recommend pt have supervision; pt expressed interest in assisted living; however, for now recommend SNF to maximize safety, endurance with functional activities.  Rehab identified problem list/impairments: Rehab identified problem list/impairments: impaired endurance, decreased safety awareness, impaired balance, impaired cognition (appears anxious)    Rehab potential is good.    Activity tolerance: Fair+    Discharge recommendations: Discharge Facility/Level Of Care Needs: Nursing Facility, Skilled  Barriers to discharge: Barriers to Discharge: Decreased caregiver support (home accessibility challenges; decreased safety awareness regarding his condition and limitations)    Equipment recommendations: Equipment Needed After Discharge: bath bench     GOALS:    Physical Therapy Goals        Problem: Physical Therapy Goal    Goal Priority Disciplines Outcome Goal Variances Interventions   Physical Therapy Goal     PT/OT, PT Ongoing (interventions implemented as appropriate)      Description:  Goals to be met by: 2017     Patient will increase functional independence with mobility by performin. Sit to stand transfer with Modified [place]  2. Bed to chair transfer with Modified [place]   3. Gait  x 150 feet with Modified [place] pacing self with O2 sats remaining 88-92% on 2L O2  4. Ascend/descend 12 stair with bilateral Handrails Modified [place] pacing self with O2 sats remaining 88-92% on O2 2L                      PLAN:    Patient to be seen 5 x/week to address the above listed problems via gait training, therapeutic activities, therapeutic exercises  Plan of Care expires: 17  Plan of Care reviewed with: patient    Functional Assessment Tool Used: ampac  Score: 19  Functional Limitation: Mobility: Walking and moving around  Mobility: Walking and Moving Around Current Status (): CK  Mobility: Walking and Moving Around Goal Status (): RONALD Lock, PT  2017

## 2017-06-26 NOTE — PLAN OF CARE
Pt is being discharged to [facility]. Removed tele, no ectopy. Removed IV, tip intact. Went over discharge instructions with pt, verbalized understanding. Waiting for transport to facility.

## 2017-06-26 NOTE — PLAN OF CARE
Problem: Occupational Therapy Goal  Goal: Occupational Therapy Goal  Goals to be met by: 7/25/2017     Patient will increase functional independence with ADLs by performing:    UE Dressing with Modified [place].  LE Dressing with Modified [place].  Grooming while standing at sink with [place].  Toileting from toilet with Modified [place] for hygiene and clothing management.   Bathing from  shower chair/bench with Modified [place].  Toilet transfer to toilet with Modified [place].    Pt. Reports use of O2 at home.  Does not have any DME for bathing/toileting; does not use/have AD for ambulation.  To benefit from TTB to increase safety in bathing task 2* decreased endurance.  Pt. Reports sitting on edge of tub which increases risk for falls during bathing task.  Post acute placement TBD.      Outcome: Ongoing (interventions implemented as appropriate)  OT initial eval complete.  Pt. Is extremely talkative; requires redirection to tasks throughout.  Assist for line management & remained on 2L O2 NC throughout.  Doffed/donned socks seated EOB with downward reaching & hip/knee flex with foot supported on bed.  Ambulated bed <-> toilet with SBA & no ambulation device.  Stand-pivot toilet T/F with SBA for safety.  Required instruction for seated rest break after ambulation tasks with redirection for focus on deep breathing tech & no talking.  Pt. With mod follow through with deep breathing tech.  SBA for functional T/F & LB dress socks only this day.  Requires redirection to tasks 2* impaired cognition & v/c for energy conservation 2* impaired endurance.  Pt. To benefit from continued OT services to increase independence in self-care & functional T/F.  OT to follow.

## 2017-06-26 NOTE — PLAN OF CARE
Problem: Physical Therapy Goal  Goal: Physical Therapy Goal  Goals to be met by: 2017     Patient will increase functional independence with mobility by performin. Sit to stand transfer with Modified [place]  2. Bed to chair transfer with Modified [place]   3. Gait  x 150 feet with Modified [place] pacing self with O2 sats remaining 88-92% on 2L O2  4. Ascend/descend 12 stair with bilateral Handrails Modified [place] pacing self with O2 sats remaining 88-92% on O2 2L    Outcome: Ongoing (interventions implemented as appropriate)  Patient exhibits SOB with minimal exertion; pt is basically [place]id[place] with bed mobility and SBA/supervision for transfers and amb with O2 tank in tow; desatted to 81% on 2L and recovered within 3-5 min with VCs for deep breathing; recommend continued reinforcement of pacing self with activities; pt is not receptive to rollator with seat so that the pt can rest when needed; he has decreased safety awareness in understanding his condition and limitations, therefore, recommend pt have supervision; pt expressed interest in assisted living; however, for now recommend SNF to maximize safety, endurance with functional activities.

## 2017-06-26 NOTE — PROGRESS NOTES
The Sw received a call from Peri at [facility] questioning the pt's last drink b/c his h&p shows drinking. The Sw met with the pt and he stated the last time he took a drink was in May. Peri states they can accept the pt and the Sw informed her the pt's ready for d/c today. The Sw informed her per the pt he can sign his own admit papers. The Sw spoke to Conrado at [facility] who states her DON has denied the pt stating they can't meet his needs. The Sw met with the pt again and he's agreeable to [facility] b/c [facility] denied him. The Sw spoke to the team and they will update the d/c orders to reflect [facility]. The Sw informed the team PT has to see the pt,the ppd must be placed and the 142 has to be given by the state before the pt can d/c and they states they understand.

## 2017-06-26 NOTE — TREATMENT PLAN
[facility]    FACILITY TRANSFER ORDERS      Patient Name: Christiano Baer  YOB: 1960    PCP: Primary Doctor No   PCP Address: None  PCP Phone Number: None  PCP Fax: None    Encounter Date: 06/26/2017    Admit to: [facility]    Vital Signs:  Routine    Diagnoses:   Active Hospital Problems    Diagnosis  POA    *Sepsis with hypotension [A41.9]  Yes    Hypotension due to drugs [I95.2]  Yes    Diastolic dysfunction [I51.9]  Yes    Pulmonary hypertension [I27.2]  Yes    Hypotension [I95.9]  Yes    Pneumonia of right lung due to infectious organism [J18.9]  Yes    Acute on chronic respiratory failure with hypoxia [J96.21]  Yes    COPD exacerbation [J44.1]  Yes    MARLENA (acute kidney injury) [N17.9]  Yes    Hypokalemia [E87.6]  Yes    Hyponatremia [E87.1]  Yes    Diarrhea [R19.7]  Yes    Elevated total protein [R77.8]  Yes    Muscular deconditioning [R29.898]  Yes    Tobacco abuse [Z72.0]  Yes    Alcohol abuse [F10.10]  Yes    Volume depletion [E86.9]  Yes      Resolved Hospital Problems    Diagnosis Date Resolved POA   No resolved problems to display.       Allergies:Review of patient's allergies indicates:  No Known Allergies    Diet: regular diet    Activities: Activity as tolerated    Nursing: none      Labs: none    Respiratory: Duo-Nebs q6h PRN, NC O2 @2L    CONSULTS:    Physical Therapy to evaluate and treat. , Occupational Therapy to evaluate and treat. and  to evaluate for community resources/long-range planning.    MISCELLANEOUS CARE:  none    WOUND CARE ORDERS  None    Equipment: Tub/bath bench recommended    Medications: Review discharge medications with patient and family and provide education.      Current Discharge Medication List      START taking these medications    Details   albuterol-ipratropium 2.5mg-0.5mg/3mL (DUO-NEB) 0.5 mg-3 mg(2.5 mg base)/3 mL nebulizer solution Take 3 mLs by nebulization every 6 (six) hours as needed for Wheezing or  Shortness of Breath. Rescue  Qty: 3 mL, Refills: 100      guaifenesin (MUCINEX) 600 mg 12 hr tablet Take 1 tablet (600 mg total) by mouth 2 (two) times daily. For 1 week.      lactobacillus acidophilus & bulgar (LACTINEX) 100 million cell packet Take 1 packet (1 each total) by mouth once daily.      moxifloxacin (AVELOX) 400 mg tablet Take 1 tablet (400 mg total) by mouth once daily. STOP DATE 6/30/17  Qty: 4 tablet, Refills: 0      predniSONE (DELTASONE) 20 MG tablet Take 2 tablets (40 mg total) by mouth once daily. For 2 days  Qty: 4 tablet, Refills: 0         CONTINUE these medications which have NOT CHANGED    Details   gabapentin (NEURONTIN) 300 MG capsule Take 300 mg by mouth 3 (three) times daily.      mometasone-formoterol (DULERA) 200-5 mcg/actuation inhaler Inhale 2 puffs into the lungs 2 (two) times daily. Controller      albuterol 90 mcg/actuation inhaler Inhale 1-2 puffs into the lungs every 6 (six) hours as needed for Wheezing.  Qty: 1 Inhaler, Refills: 0         STOP taking these medications       amlodipine (NORVASC) 5 MG tablet Comments:   Reason for Stopping:         hydrochlorothiazide (HYDRODIURIL) 12.5 MG Tab Comments:   Reason for Stopping:         hydrocodone-acetaminophen 10-325mg (NORCO)  mg Tab Comments:   Reason for Stopping:         valsartan (DIOVAN) 160 MG tablet Comments:   Reason for Stopping:         valsartan-hydrochlorothiazide (DIOVAN-HCT) 160-12.5 mg per tablet Comments:   Reason for Stopping:         hydrocodone-acetaminophen 5-325mg (NORCO) 5-325 mg per tablet Comments:   Reason for Stopping:         naproxen (NAPROSYN) 500 MG tablet Comments:   Reason for Stopping:                    _________________________________  Evan Saunders MD  06/26/2017

## 2017-06-26 NOTE — PROGRESS NOTES
The Sw faxed the PASRR to [facility].     12:39pm The Sw faxed the d/c orders to [facility] via [facility].

## 2017-06-26 NOTE — PT/OT/SLP EVAL
Occupational Therapy  Evaluation/Treatment    Christiano Baer   MRN: [id number]   Admitting Diagnosis: Sepsis with hypotension    OT Date of Treatment: 06/25/17   OT Start Time: 1606  OT Stop Time: 1638  OT Total Time (min): 32 min    Billable Minutes:  Evaluation 17  Self Care/Home Management 15    Diagnosis: Sepsis with hypotension       Past Medical History:   Diagnosis Date    COPD (chronic obstructive pulmonary disease)     ETOH abuse       Past Surgical History:   Procedure Laterality Date    CHEST TUBE INSERTION  November 2013         General Precautions: Standard, contact, fall  Orthopedic Precautions: N/A  Braces: N/A    Do you have any cultural, spiritual, Scientology conflicts, given your current situation?: None     Patient History:  Living Environment  Lives With: child(jenn), adult, other (see comments) (nephew & step-son)  Living Arrangements: apartment  Home Accessibility: stairs to enter home  Number of Stairs to Enter Home:  (10-12)  Stair Railings at Home: outside, present on right side (ascending)  Transportation Available: family or friend will provide, public transportation, taxi  Living Environment Comment: Pt. lives with step-son & nephew in [facility] apartment with 10-12STE & R-side HR ascending with bathroom set-up as tub/shower combo.  Pt. PLOF independent with ADL tasks with AD for ambulation or DME for bathing/toileting & use of O2 at all times.  Reports bathing by sitting at edge of bathtub.  Performs simple meal prep, light household cleaning, & takes public transportation.  Daughter works & nephew/step-son do not assist per Pt. report.    Equipment Currently Used at Home: oxygen    Prior level of function:   Bed Mobility/Transfers: independent  Grooming: independent  Bathing: independent  Upper Body Dressing: independent  Lower Body Dressing: independent  Toileting: independent  Driving License: No  Mode of Transportation: Bus, Other (Comment) (public transportation)  Occupation:  Unemployed  IADL Comments: Pt. reports performing simple meal prep for breakfast & microwaveable meals & daugter sometimes brings meals.  Takes public transportation for groceries or appointments.     Dominant hand: right    Subjective:  Communicated with nursing prior to session.  Pt. Agreeable to OT eval/treat.  Chief Complaint: Pt. With no complaints this day.  Patient/Family stated goals: Pt. Would like to get his own place & gain independence.    Pain/Comfort  Pain Rating 1: 0/10  Pain Rating Post-Intervention 1: 0/10    Objective:  Patient found with: peripheral IV, telemetry, oxygen    Cognitive Exam:  Oriented to: Person, Place, Time and Situation  Follows Commands/attention: Easily distracted, Follows one-step commands and Follows two-step commands  Communication: clear/fluent and extremely talkative   Memory:  No Deficits noted  Safety awareness/insight to disability: impaired  Coping skills/emotional control: Appropriate to situation    Visual/perceptual:  Intact    Physical Exam:  Postural examination/scapula alignment: Rounded shoulder and Head forward  Skin integrity: Visible skin intact  Edema: None noted     Sensation:   Impaired  Pt. reports numbness to base of L-thumb 2* previous laceration per Pt. report.    Upper Extremity Range of Motion:  Right Upper Extremity: WNL  Left Upper Extremity: WNL    Upper Extremity Strength:  Right Upper Extremity: WNL  Left Upper Extremity: WNL   Strength: WNL    Fine motor coordination:   Intact    Gross motor coordination: WFL    Functional Mobility:  Bed Mobility:       Transfers:  Sit <> Stand Assistance: Stand By Assistance  Sit <> Stand Assistive Device: No Assistive Device  Toilet Transfer Technique: Stand Pivot  Toilet Transfer Assistance: Stand By Assistance  Toilet Transfer Assistive Device: No Assistive Device    Functional Ambulation: Ambulated bed <-> toilet with SBA & no ambulation device with 2L O2 NC & assist for line management.    Activities  of Daily Living:     Feeding adaptive equipment: None     UE adaptive equipment: None  LE Dressing Level of Assistance: Supervision (socks only)  LE adaptive equipment: None                    Bathing adaptive equipment: no assistive device    Balance:   Static Sit: GOOD: Takes MODERATE challenges from all directions  Dynamic Sit: GOOD-: Maintains balance through MODERATE excursions of active trunk movement,     Static Stand: GOOD-: Takes MODERATE challenges from all directions inconsistently  Dynamic stand: GOOD: Needs SUPERVISION only during gait and able to self right with moderate     Therapeutic Activities and Exercises:  OT initial eval complete.  Pt. Is extremely talkative; requires redirection to tasks throughout.  Assist for line management & remained on 2L O2 NC throughout.  Doffed/donned socks seated EOB with downward reaching & hip/knee flex with foot supported on bed.  Ambulated bed <-> toilet with SBA & no ambulation device.  Stand-pivot toilet T/F with SBA for safety.  Required instruction for seated rest break after ambulation tasks with redirection for focus on deep breathing tech & no talking.  Pt. With mod follow through with deep breathing tech.  SBA for functional T/F & LB dress socks only this day.  Requires redirection to tasks 2* impaired cognition & v/c for energy conservation 2* impaired endurance.  Pt. To benefit from continued OT services to increase independence in self-care & functional T/F.  OT to follow.     AM-PAC 6 CLICK ADL  How much help from another person does this patient currently need?  1 = Unable, Total/Dependent Assistance  2 = A lot, Maximum/Moderate Assistance  3 = A little, Minimum/Contact Guard/Supervision  4 = None, Modified [place]/Independent    Putting on and taking off regular lower body clothing? : 3  Bathing (including washing, rinsing, drying)?: 3  Toileting, which includes using toilet, bedpan, or urinal? : 3  Putting on and taking off regular upper body  "clothing?: 4  Taking care of personal grooming such as brushing teeth?: 4  Eating meals?: 4  Total Score: 21    AM-PAC Raw Score CMS "G-Code Modifier Level of Impairment Assistance   6 % Total / Unable   7 - 9 CM 80 - 100% Maximal Assist   10-14 CL 60 - 80% Moderate Assist   15 - 19 CK 40 - 60% Moderate Assist   20 - 22 CJ 20 - 40% Minimal Assist   23 CI 1-20% SBA / CGA   24 CH 0% Independent/ Mod I       Patient left supine with all lines intact, call button in reach, bed alarm on and nursing notified    Assessment:  Christiano Baer is a 56 y.o. male with a medical diagnosis of Sepsis with hypotension and presents with below deficits decreasing independence in self-care & functional T/F.  Needs TTB to increase safety & independence in bathing tasks.  Post-acute placement TBD.     Rehab identified problem list/impairments: Rehab identified problem list/impairments: impaired endurance, impaired self care skills, impaired balance, impaired cognition    Rehab potential is good.    Activity tolerance: Fair    Discharge recommendations: Discharge Facility/Level Of Care Needs: other (see comments) (TBD)     Barriers to discharge: Barriers to Discharge: Inaccessible home environment, Decreased caregiver support, Other (Comment) (flight of stairs to enter apartment; tub/shower combo)    Equipment recommendations: bath bench     GOALS:    Occupational Therapy Goals        Problem: Occupational Therapy Goal    Goal Priority Disciplines Outcome Interventions   Occupational Therapy Goal     OT, PT/OT Ongoing (interventions implemented as appropriate)    Description:  Goals to be met by: 7/25/2017     Patient will increase functional independence with ADLs by performing:    UE Dressing with Modified [place].  LE Dressing with Modified [place].  Grooming while standing at sink with [place].  Toileting from toilet with Modified [place] for hygiene and clothing management.   Bathing from  shower " chair/bench with Modified [place].  Toilet transfer to toilet with Modified [place].    Pt. Reports use of O2 at home.  Does not have any DME for bathing/toileting; does not use/have AD for ambulation.  To benefit from TTB to increase safety in bathing task 2* decreased endurance.  Pt. Reports sitting on edge of tub which increases risk for falls during bathing task.  Post acute placement TBD.                        PLAN:  Patient to be seen 5 x/week to address the above listed problems via self-care/home management, therapeutic activities, therapeutic exercises  Plan of Care expires: 07/25/17  Plan of Care reviewed with: patient    OT G-codes  Functional Assessment Tool Used: [facility]  Score: 21  Functional Limitation: Self care  Self Care Current Status (): COOPER  Self Care Goal Status (): RONALD Goff OT  06/26/2017

## 2017-06-26 NOTE — PROGRESS NOTES
[facility] Internal Medicine Resident AISHA Progress Note    Subjective:      Patient states he feels well this AM, admits to improved respiratory status and improved cough. Patient states he still has diarrhea, x2 water non-bloody overnight. Denies fever, chill, chest pain, abdominal pain.     Objective:   Last 24 Hour Vital Signs:  BP  Min: 99/62  Max: 123/74  Temp  Av.7 °F (36.5 °C)  Min: 97.5 °F (36.4 °C)  Max: 97.9 °F (36.6 °C)  Pulse  Av.3  Min: 61  Max: 97  Resp  Av.4  Min: 16  Max: 22  SpO2  Av.7 %  Min: 98 %  Max: 100 %  Weight  Av.9 kg (114 lb 8 oz)  Min: 51.9 kg (114 lb 8 oz)  Max: 51.9 kg (114 lb 8 oz)  I/O last 3 completed shifts:  In:  [P.O.:]  Out:  [Urine:]    Physical Examination:    General: Alert and awake in no apparent distress  Head:  Normocephalic and atraumatic  Eyes:  PERRL; EOMi with anicteric sclera and clear conjunctivae  Mouth:  Oropharynx clear and without exudate; moist mucous membranes  Cardio:  Regular rate and rhythm with normal S1 and S2; no murmurs or rubs  Resp:  Respirations unlabored; Course breath sounds RLL  Abdom: Soft, NTND with normoactive bowel sounds  Extrem: Warm and well-perfused with no clubbing, cyanosis or edema  Skin:  No rashes, lesions, or color changes  Pulses: 2+ and symmetric distally  Neuro:  AAOx3; cooperative and pleasant with no focal deficits    Laboratory:  Laboratory Data  Pertinent Findings:  Recent Labs      17   1253  17   0345  17   0611  17   0309   WBC  18.93*  16.55*  15.59*  15.87*   HGB  14.0  12.1*  12.2*  11.8*   HCT  39.4*  34.7*  35.8*  34.4*   PLT  319  315  342  352*   MCV  93  93  95  94   RDW  12.7  12.8  13.1  13.0   GRAN  72.4  13.4*  80.0*  81.3*  12.7*  84.2*  13.4*   LYMPH  8.0*  1.5  3.0*  CANCELED  6.7*  1.0  7.8*  1.2   MONO  19.1*  3.6*  10.0  CANCELED  9.9  1.6*  5.9  0.9   EOS  0.0  CANCELED  0.0  0.0   BASO  0.10  CANCELED  0.03  0.04   EOSINOPHIL  0.0  0.0   0.0  0.0   BASOPHIL  0.5  0.0  0.2  0.3       Recent Labs      06/23/17   1253  06/24/17   0345  06/25/17   0611  06/26/17   0309   NA  133*  136  138  136   K  3.1*  3.3*  4.3  3.6   CL  98  107  110  109   CO2  22*  19*  20*  19*   BUN  56*  47*  28*  22*   CREATININE  1.9*  1.3  0.9  0.9     No results for input(s): POCTGLUCOSE in the last 72 hours.  Recent Labs      06/23/17   1253  06/24/17   0345  06/25/17   0611  06/26/17   0309   PROT  7.2  6.3  6.1  6.1   ALBUMIN  2.0*  1.8*  1.9*  1.8*   BILITOT  1.3*  0.8  0.4  0.3   AST  22  21  17  24   ALT  14  13  13  16   ALKPHOS  93  85  82  96       Microbiology Data  Pertinent Findings:  - 6/23 Blood Cx: NGTD,   - 6/26 Resp Cx: Stain shows Gr+ cocci/Gr- Rods, Cx normal respiratory ignacio    Other Results:  EKG (my interpretation): No new EKG    Radiology Data   Pertinent Findings (my interpretation):  No new imaging    Current Medications:     Infusions:        Scheduled:   albuterol-ipratropium 2.5mg-0.5mg/3mL  3 mL Nebulization Q4H WAKE    ceFEPime (MAXIPIME) IVPB  2 g Intravenous Q12H    ciprofloxacin  400 mg Intravenous Q12H    fluticasone-vilanterol  1 puff Inhalation Daily    guaifenesin  600 mg Oral BID    heparin (porcine)  5,000 Units Subcutaneous Q8H    lactobacillus acidophilus & bulgar  1 packet Oral BID    predniSONE  40 mg Oral Daily    vancomycin (VANCOCIN) IVPB  1,000 mg Intravenous Q24H        PRN:  albuterol-ipratropium 2.5mg-0.5mg/3mL, dextromethorphan-guaifenesin  mg/5 ml, loperamide, ondansetron    Antibiotics and Day Number of Therapy:  Moxifloxacin IV    Lines and Day Number of Therapy:  PIV    Assessment:     Christiano Baer is a 56 y.o.male with  Patient Active Problem List    Diagnosis Date Noted    Hypotension due to drugs 06/25/2017    Diastolic dysfunction 06/25/2017    Pulmonary hypertension 06/25/2017    Hypotension 06/24/2017    Pneumonia of right lung due to infectious organism 06/23/2017    Sepsis with  hypotension 06/23/2017    Acute on chronic respiratory failure with hypoxia 06/23/2017    COPD exacerbation 06/23/2017    MARLENA (acute kidney injury) 06/23/2017    Hypokalemia 06/23/2017    Hyponatremia 06/23/2017    Diarrhea 06/23/2017    Elevated total protein 06/23/2017    Muscular deconditioning 06/23/2017    Tobacco abuse 06/23/2017    Alcohol abuse 06/23/2017    Volume depletion 06/23/2017        Plan:     Sepsis with hypotension 2/2 CAP  -Pt appears to have PNA as source  -SIRS 3/4 (Elevated WBC, tachycardia, tachypnea) w/ Source (CAP)  - In ED, low BP: responsive to initial IVF challenge. Hypotension likely due to patient not taking home BP meds appropriately with multiples of same BP meds in possession  - 6/23 Blood Cx: NGTD, Resp Cx: Stain shows Gr+ cocci/Gr- Rods, Cx normal respiratory ignacio  - At admission, Lactate WNL, PCT 4.47  - Initially on Vanc/Cefepime/Flagyl/Cipro, can likely de-escalate to Avelox,  holding BP meds in setting of sepsis     Acute hypoxic resp failure 2/2 CAP superimposed on COPD  - Continue home NC O2 setting of 2L and adjust as needed  - Continue Breo, scheduled and PRN Duonebs as well as steroid burst, cont Mucinex    MARLENA (resolved)  - At admission, Cr 1.9 most likely pre-renal, FENa 0.1%  -Cr improved back near baseline of Cr 0.9 with IVF  -Continue to monitor     Diarrhea  - C. Diff and stool WBCs negative  - Started on probiotics     Protein gap  -Hep panel and HIV screen pending     Hypokalemia  -Replete K and Mg PRN      HTN  - Holding in setting of sepsis  - BP low on admission, suspect that patient is not taking home BP meds appropriately as patient has duplicates of same medication of which he is not able to tell us which ones he took, empty bottles recently filled that should not be empty if med taken appropriately      Elevated BNP  -, no hx of HF or echo in the system  -TTE as above, normal LVEF, +DD     Neuropathic pain  -Hold home Norco and gabapentin  given hypotension        PPx: SQH  Dispo: Pending culture results, clinical improvement    Gopal Allen  [facility] Internal Medicine HO-I  [facility] Internal Medicine Service Team B    [facility] Medicine Hospitalist Pager numbers:   [facility] Hospitalist Medicine Team A (Yanna/Tonya): [phone]  [facility] Hospitalist Medicine Team B (Kendall/Mata):  [phone]

## 2017-06-26 NOTE — PLAN OF CARE
06/26/17 1524   Final Note   Assessment Type Final Discharge Note   Discharge Disposition SNF   Right Care Referral Info   Post Acute Recommendation SNF   Referral Type snf   Facility Name [facility]

## 2017-06-26 NOTE — PROGRESS NOTES
[facility] via [facility].

## 2017-06-26 NOTE — PLAN OF CARE
BARBRA working on final placement for SNF       06/26/17 1334   Final Note   Assessment Type Final Discharge Note   Discharge Disposition SNF   Discharge planning education complete? Yes   Discharge plans and expectations educations in teach back method with documentation complete? Yes   Offered [facility] -- Bedside Delivery? n/a   Discharge/Hospital Encounter Summary to (non-[facility]) PCP No   Referral to Outpatient Case Management complete? No   Referral to / orders for Home Health Complete? No   30 day supply of medicines given at discharge, if documented non-compliance / non-adherence? No   Any social issues identified prior to discharge? Yes   Did you assess the readiness or willingness of the family or caregiver to support self management of care? Yes   Right Care Referral Info   Post Acute Recommendation SNF   Referral Type [facility]   Facility Name [facility]     Juliane Kent, RN, CCM, CMSRN  RN Transition Navigator  [phone]

## 2017-06-27 ENCOUNTER — PATIENT OUTREACH (OUTPATIENT)
Dept: ADMINISTRATIVE | Facility: CLINIC | Age: 57
End: 2017-06-27

## 2017-06-27 LAB
BACTERIA SPEC AEROBE CULT: NORMAL
GRAM STN SPEC: NORMAL

## 2017-06-27 NOTE — PROGRESS NOTES
Physical Therapy   d/c G-Codes    Christiano Baer   MRN: [id number]      06/26/17 1158   PT Evaluation   $PT Evaluation EVAL, LOW COMPLEXITY   Therapeutic Interventions   $Gait Training 1 unit   $Therapeutic Activity 1 unit   PT G-Codes   Functional Assessment Tool Used [facility]   Score 19   Functional Limitation Mobility: Walking and moving around   Mobility: Walking and Moving Around Current Status () CK   Mobility: Walking and Moving Around Goal Status () CI   Mobility: Walking and Moving Around Discharge Status () CK   Vickie Lock, PT

## 2017-06-27 NOTE — DISCHARGE SUMMARY
[facility] Internal Medicine Discharge Summary    Primary Team: [facility] Internal Medicine Team A  Attending Physician: Dr. Farzana Castaneda  Resident: Chip  Intern: Tiffany    Date of Admit: 6/23/2017  Date of Discharge: 6/26/2017    Discharge to: [facility] (SNF)  Condition: Stable for discharge    Discharge Diagnoses     Patient Active Problem List   Diagnosis    Pneumonia of right lung due to infectious organism    Sepsis with hypotension    Acute on chronic respiratory failure with hypoxia    COPD exacerbation    MARLENA (acute kidney injury)    Hypokalemia    Hyponatremia    Diarrhea    Elevated total protein    Muscular deconditioning    Tobacco abuse    Alcohol abuse    Volume depletion    Hypotension    Hypotension due to drugs    Diastolic dysfunction    Pulmonary hypertension       Consultants and Procedures     Consultants:  None    Procedures:   None    Brief History of Present Illness      Christiano Baer is a 56 y.o. AA male who  has a past medical history of COPD (chronic obstructive pulmonary disease) and ETOH abuse.. The patient presented to [facility] on 6/23/2017 with a primary complaint of Cough (cough and diarrhea for one week)     The patient was in their usual state of health until 3 days after he was discharged from [facility] on 5/29 for an admission for COPD. He reports that 3 days after this he began having a nagging cough productive of brown sputum and SOB worsened from baseline. At baseline he reports being able to walk a quarter block which is largely unchanged. This continued to persist and worsened slightly over the next few weeks. He also recently established care with a PCP at . He is a poor historian, but it seems he has been taking more BP meds than prescribed. He has Rx for Norvasc, HCTZ, valsartan, and HCTZ/valsartan combo. Pt seems unsure of what he's really taking, and some medication bottles are empty. He also says he has been experiencing  subjective fevers/chills, generalized weakness, decreased appetite, and watery diarrhea 2-3 times per day since Monday. He is on 2L NC O2 at home chronically. He denies any CP, belly pain, N/V, HA, or LH.     For the full HPI please refer to the History & Physical from this admission.    Hospital Course By Problem with Pertinent Findings     Sepsis with Hypotension 2/2 CAP  -SIRS 3/4 (Elevated WBC, tachycardia, tachypnea) w/ Source (CAP)  - 6/23/17 CXR:  New increasing patchy infiltrates right mid and lower lungs perihilar superimposed on fibrotic emphysema bled disease right chest  - In ED, low BP: responsive to initial IVF challenge. Hypotension likely due to patient not taking home BP meds appropriately with multiples of same BP meds in possession  - 6/23 Blood Cx: NGTD, Resp Cx: Stain shows Gr+ cocci/Gr- Rods, Cx normal respiratory ignacio  - At admission, Lactate WNL, PCT 4.47  - Initially on Vanc/Cefepime/Flagyl/Cipro, de-escalated to Moxifloxacin. Held home BP meds in setting of sepsis.  - Discharged on Moxifloxacin PO to complete 7 days of CAP treatmnet     Acute hypoxic resp failure 2/2 CAP superimposed on COPD  - Continue home NC O2 setting of 2L and adjust as needed  - Continue Breo, scheduled and PRN Duonebs as well as steroid burst, cont Mucinex     MARLENA (resolved)  - At admission, Cr 1.9 most likely pre-renal, FENa 0.1%  -Cr improved back near baseline of Cr 0.9 with IVF     Diarrhea  - C. Diff and stool WBCs negative  - Started on probiotics     Protein gap  -Hep panel and HIV screen negative     Hypokalemia  -Repleted K and Mg PRN      HTN  - Holding BP meds in setting of sepsis  - BP low on admission, suspect that patient is not taking home BP meds appropriately as patient has duplicates of same medication of which he is not able to tell us which ones he took, empty bottles recently filled that should not be empty if med taken appropriately        Neuropathic pain  - No issues during hospitalization  during admission  - Held home Norco and gabapentin given hypotension    Discharge Medications        Medication List      START taking these medications    albuterol-ipratropium 2.5mg-0.5mg/3mL 0.5 mg-3 mg(2.5 mg base)/3 mL nebulizer solution  Commonly known as:  DUO-NEB  Take 3 mLs by nebulization every 6 (six) hours as needed for Wheezing or Shortness of Breath. Rescue     guaifenesin 600 mg 12 hr tablet  Commonly known as:  MUCINEX  Take 1 tablet (600 mg total) by mouth 2 (two) times daily. For 1 week.     lactobacillus acidophilus & bulgar 100 million cell packet  Commonly known as:  LACTINEX  Take 1 packet (1 each total) by mouth once daily.     moxifloxacin 400 mg tablet  Commonly known as:  AVELOX  Take 1 tablet (400 mg total) by mouth once daily. STOP DATE 6/30/17     predniSONE 20 MG tablet  Commonly known as:  DELTASONE  Take 2 tablets (40 mg total) by mouth once daily. For 2 days        CONTINUE taking these medications    albuterol 90 mcg/actuation inhaler  Inhale 1-2 puffs into the lungs every 6 (six) hours as needed for Wheezing.     DULERA 200-5 mcg/actuation inhaler  Generic drug:  mometasone-formoterol     gabapentin 300 MG capsule  Commonly known as:  NEURONTIN        STOP taking these medications    amlodipine 5 MG tablet  Commonly known as:  NORVASC     hydrochlorothiazide 12.5 MG Tab  Commonly known as:  HYDRODIURIL     hydrocodone-acetaminophen 10-325mg  mg Tab  Commonly known as:  NORCO     hydrocodone-acetaminophen 5-325mg 5-325 mg per tablet  Commonly known as:  NORCO     naproxen 500 MG tablet  Commonly known as:  NAPROSYN     valsartan 160 MG tablet  Commonly known as:  DIOVAN     valsartan-hydrochlorothiazide 160-12.5 mg per tablet  Commonly known as:  DIOVAN-HCT           Where to Get Your Medications      You can get these medications from any pharmacy    You don't need a prescription for these medications  · guaifenesin 600 mg 12 hr tablet  · lactobacillus acidophilus & bulgar  100 million cell packet     Information about where to get these medications is not yet available    Ask your nurse or doctor about these medications  · albuterol-ipratropium 2.5mg-0.5mg/3mL 0.5 mg-3 mg(2.5 mg base)/3 mL nebulizer solution  · moxifloxacin 400 mg tablet  · predniSONE 20 MG tablet         Discharge Information:   Diet:  Cardiac    Physical Activity:  As tolerated    Instructions:  1. Take all medications as prescribed  2. Keep all follow-up appointments  3. Return to the hospital or call your primary care physicians if any worsening symptoms such as fever, chills, chest pain, shortness of breath, abdominal pain, dizziness, syncope occur.      Follow-Up Appointments:  Follow-up Information     [facility].    Specialties:  Nursing Home Agency, SNF Agency  Contact information:  [address] [place] [address] [phone]             Mary dodd , PCP. Schedule an appointment as soon as possible for a visit in 2 weeks.                 Gopal Allen  [facility], HO-I

## 2017-06-27 NOTE — PT/OT/SLP DISCHARGE
Physical Therapy Discharge Summary    Christiano Baer  MRN: [id number]   Sepsis with hypotension   Patient Discharged from acute Physical Therapy on 2017.  Please refer to prior PT noted date on 2017 for functional status.     Assessment:   Patient has not met goals.  GOALS:    Physical Therapy Goals     Not on file          Multidisciplinary Problems (Resolved)        Problem: Physical Therapy Goal    Goal Priority Disciplines Outcome Goal Variances Interventions   Physical Therapy Goal   (Resolved)     PT/OT, PT Outcome(s) achieved     Description:  Goals to be met by: 2017     Patient will increase functional independence with mobility by performin. Sit to stand transfer with Modified [place]  2. Bed to chair transfer with Modified [place]   3. Gait  x 150 feet with Modified [place] pacing self with O2 sats remaining 88-92% on 2L O2  4. Ascend/descend 12 stair with bilateral Handrails Modified [place] pacing self with O2 sats remaining 88-92% on O2 2L                    Reasons for Discontinuation of Therapy Services  Transfer to alternate level of care.      Plan:  Patient Discharged to: Skilled Nursing Facility   Vickie Lock, PT  .

## 2017-06-28 LAB
BACTERIA BLD CULT: NORMAL
BACTERIA BLD CULT: NORMAL

## 2017-06-28 NOTE — PLAN OF CARE
Problem: Occupational Therapy Goal  Goal: Occupational Therapy Goal  Goals to be met by: 7/25/2017     Patient will increase functional independence with ADLs by performing:    UE Dressing with Modified [place].  LE Dressing with Modified [place].  Grooming while standing at sink with [place].  Toileting from toilet with Modified [place] for hygiene and clothing management.   Bathing from  shower chair/bench with Modified [place].  Toilet transfer to toilet with Modified [place].    Pt. Reports use of O2 at home.  Does not have any DME for bathing/toileting; does not use/have AD for ambulation.  To benefit from TTB to increase safety in bathing task 2* decreased endurance.  Pt. Reports sitting on edge of tub which increases risk for falls during bathing task.  Post acute placement TBD.       Outcome: Ongoing (interventions implemented as appropriate)  Pt. With no c/o pain this day.  Remained on 2L O2 NC throughout.  Performed sit<->stand ther-ex from EOB with progressive reps of 3,4, & 5 with seated rest breaks between sets.  Min to mod v/c for deep breathing tech with good carryover.  Performed downward/upward functional reaching ther-act in stance with seated rest breaks as needed.  Requires min to mod v/c for energy conservation & self-pacing.  Requires frequent rest breaks 2* activity tolerance.  To benefit from continued OT services to increase independence in clarisa-care, functional T/F.  Continue POC.

## 2017-06-28 NOTE — PT/OT/SLP PROGRESS
Occupational Therapy  Treatment    Christiano Baer   MRN: [id number]   Admitting Diagnosis: Sepsis with hypotension    OT Date of Treatment: 06/26/17   OT Start Time: 1518  OT Stop Time: 1545  OT Total Time (min): 27 min    Billable Minutes:  Therapeutic Activity 27    General Precautions: Standard, fall  Orthopedic Precautions: N/A  Braces: N/A    Do you have any cultural, spiritual, Scientologist conflicts, given your current situation?: None    Subjective:  Communicated with nursing prior to session.  Agreeable to OT.  Pain/Comfort  Pain Rating 1: 0/10  Pain Rating Post-Intervention 1: 0/10    Objective:  Patient found with: telemetry, oxygen, peripheral IV     Functional Mobility:  Bed Mobility:  Rolling/Turning Right: Independent  Supine to Sit: Independent    Transfers:   Sit <> Stand Assistance: Supervision  Sit <> Stand Assistive Device: No Assistive Device    Functional Ambulation: Defer to PT    Activities of Daily Living:     Feeding adaptive equipment: None     UE adaptive equipment: None     LE adaptive equipment: None                    Bathing adaptive equipment: no assistive device    Balance:   Static Sit: GOOD: Takes MODERATE challenges from all directions  Dynamic Sit: GOOD-: Maintains balance through MODERATE excursions of active trunk movement,     Static Stand: GOOD: Takes MODERATE challenges from all directions  Dynamic stand: GOOD-: Needs SUPERVISION only during gait and able to self right with moderate     Therapeutic Activities and Exercises:  Pt. With no c/o pain this day.  Remained on 2L O2 NC throughout.  Performed sit<->stand ther-ex from EOB with progressive reps of 3,4, & 5 with seated rest breaks between sets.  Min to mod v/c for deep breathing tech with good carryover.  Performed downward/upward functional reaching ther-act in stance with seated rest breaks as needed.  Requires min to mod v/c for energy conservation & self-pacing.  Requires frequent rest breaks 2* activity tolerance.  To  "benefit from continued OT services to increase independence in clarisa-care, functional T/F.  Continue POC.     AM-PAC 6 CLICK ADL   How much help from another person does this patient currently need?   1 = Unable, Total/Dependent Assistance  2 = A lot, Maximum/Moderate Assistance  3 = A little, Minimum/Contact Guard/Supervision  4 = None, Modified [place]/Independent    Putting on and taking off regular lower body clothing? : 3  Bathing (including washing, rinsing, drying)?: 3  Toileting, which includes using toilet, bedpan, or urinal? : 3  Putting on and taking off regular upper body clothing?: 4  Taking care of personal grooming such as brushing teeth?: 4  Eating meals?: 4  Total Score: 21     AM-PAC Raw Score CMS "G-Code Modifier Level of Impairment Assistance   6 % Total / Unable   7 - 8 CM 80 - 100% Maximal Assist   9-13 CL 60 - 80% Moderate Assist   14 - 19 CK 40 - 60% Moderate Assist   20 - 22 CJ 20 - 40% Minimal Assist   23 CI 1-20% SBA / CGA   24 CH 0% Independent/ Mod I       Patient left supine with all lines intact, call button in reach and nursing notified    ASSESSMENT:  Christiano Baer is a 56 y.o. male with a medical diagnosis of Sepsis with hypotension and presents with below deficits decreasing independence in self-care.  To benefit from placement in assisted living facility.    Rehab identified problem list/impairments: Rehab identified problem list/impairments: impaired endurance, impaired self care skills, impaired cognition, impaired balance    Rehab potential is good.    Activity tolerance: Fair    Discharge recommendations: Discharge Facility/Level Of Care Needs: assisted living facility     Barriers to discharge: Barriers to Discharge: Decreased caregiver support    Equipment recommendations: bath bench     GOALS:    Occupational Therapy Goals        Problem: Occupational Therapy Goal    Goal Priority Disciplines Outcome Interventions   Occupational Therapy Goal     OT, PT/OT " Ongoing (interventions implemented as appropriate)    Description:  Goals to be met by: 7/25/2017     Patient will increase functional independence with ADLs by performing:    UE Dressing with Modified [place].  LE Dressing with Modified [place].  Grooming while standing at sink with [place].  Toileting from toilet with Modified [place] for hygiene and clothing management.   Bathing from  shower chair/bench with Modified [place].  Toilet transfer to toilet with Modified [place].    Pt. Reports use of O2 at home.  Does not have any DME for bathing/toileting; does not use/have AD for ambulation.  To benefit from TTB to increase safety in bathing task 2* decreased endurance.  Pt. Reports sitting on edge of tub which increases risk for falls during bathing task.  Post acute placement TBD.                         Plan:  Patient to be seen 5 x/week to address the above listed problems via self-care/home management, therapeutic activities, therapeutic exercises  Plan of Care expires: 07/25/17  Plan of Care reviewed with: patient         Socorro Goff, OT  06/27/2017

## 2017-06-29 LAB
BACTERIA BLD CULT: NORMAL
BACTERIA BLD CULT: NORMAL

## 2017-07-07 NOTE — PT/OT/SLP DISCHARGE
Occupational Therapy Discharge Summary    Christiano Baer  MRN: [id number]   Sepsis with hypotension   Patient Discharged from acute Occupational Therapy on 6/26.  Please refer to prior OT note dated on 6/26 for functional status.     Assessment:   Patient appropriate for care in another setting.  GOALS:    Occupational Therapy Goals        Problem: Occupational Therapy Goal    Goal Priority Disciplines Outcome Interventions   Occupational Therapy Goal     OT, PT/OT Ongoing (interventions implemented as appropriate)    Description:  Goals to be met by: 7/25/2017     Patient will increase functional independence with ADLs by performing:    UE Dressing with Modified [place].  LE Dressing with Modified [place].  Grooming while standing at sink with [place].  Toileting from toilet with Modified [place] for hygiene and clothing management.   Bathing from  shower chair/bench with Modified [place].  Toilet transfer to toilet with Modified [place].    Pt. Reports use of O2 at home.  Does not have any DME for bathing/toileting; does not use/have AD for ambulation.  To benefit from TTB to increase safety in bathing task 2* decreased endurance.  Pt. Reports sitting on edge of tub which increases risk for falls during bathing task.  Post acute placement TBD.                       Reasons for Discontinuation of Therapy Services  Transfer to alternate level of care.      Plan:  Patient Discharged to: Skilled Nursing Facility.

## 2017-07-12 NOTE — PLAN OF CARE
After patient's discharge, his hepatitis panel resulted Hepatitis C Ab positive. I called and spoke with . Christiano Baer about this result. I recommended making an appointment with his PCP for referral to a hepatologist. I also explained the need for further testing to confirm infection with Hepatitis C, such as HCV RNA test/viral load. Patient expressed understanding.    Tracey Pereyra MD  [facility]  07/12/2017

## 2017-07-25 ENCOUNTER — HOSPITAL ENCOUNTER (INPATIENT)
Facility: HOSPITAL | Age: 57
LOS: 7 days | Discharge: NURSING, REHAB OR LONG-TERM CARE | DRG: 871 | End: 2017-08-01
Attending: EMERGENCY MEDICINE | Admitting: HOSPITALIST
Payer: MEDICARE

## 2017-07-25 DIAGNOSIS — B95.62 BACTEREMIA DUE TO METHICILLIN RESISTANT STAPHYLOCOCCUS AUREUS: ICD-10-CM

## 2017-07-25 DIAGNOSIS — A41.9 SEPSIS, DUE TO UNSPECIFIED ORGANISM: Primary | ICD-10-CM

## 2017-07-25 DIAGNOSIS — I26.99 RIGHT PULMONARY EMBOLUS: ICD-10-CM

## 2017-07-25 DIAGNOSIS — Z86.11 HISTORY OF TB (TUBERCULOSIS): ICD-10-CM

## 2017-07-25 DIAGNOSIS — B95.61 STAPHYLOCOCCUS AUREUS BACTEREMIA: ICD-10-CM

## 2017-07-25 DIAGNOSIS — R78.81 BACTEREMIA DUE TO METHICILLIN RESISTANT STAPHYLOCOCCUS AUREUS: ICD-10-CM

## 2017-07-25 DIAGNOSIS — R78.81 STAPHYLOCOCCUS AUREUS BACTEREMIA: ICD-10-CM

## 2017-07-25 DIAGNOSIS — J18.9 PNEUMONIA OF RIGHT LUNG DUE TO INFECTIOUS ORGANISM, UNSPECIFIED PART OF LUNG: ICD-10-CM

## 2017-07-25 PROBLEM — E87.6 HYPOKALEMIA: Status: RESOLVED | Noted: 2017-06-23 | Resolved: 2017-07-25

## 2017-07-25 PROBLEM — F10.10 ALCOHOL ABUSE: Status: RESOLVED | Noted: 2017-06-23 | Resolved: 2017-07-25

## 2017-07-25 PROBLEM — I95.2 HYPOTENSION DUE TO DRUGS: Status: RESOLVED | Noted: 2017-06-25 | Resolved: 2017-07-25

## 2017-07-25 PROBLEM — R19.7 DIARRHEA: Status: RESOLVED | Noted: 2017-06-23 | Resolved: 2017-07-25

## 2017-07-25 PROBLEM — I27.20 PULMONARY HYPERTENSION: Chronic | Status: ACTIVE | Noted: 2017-06-25

## 2017-07-25 PROBLEM — Z99.81 SUPPLEMENTAL OXYGEN DEPENDENT: Chronic | Status: ACTIVE | Noted: 2017-07-25

## 2017-07-25 PROBLEM — E80.6 HYPERBILIRUBINEMIA: Status: ACTIVE | Noted: 2017-07-25

## 2017-07-25 PROBLEM — E83.42 HYPOMAGNESEMIA: Status: ACTIVE | Noted: 2017-07-25

## 2017-07-25 PROBLEM — I51.89 DIASTOLIC DYSFUNCTION: Chronic | Status: ACTIVE | Noted: 2017-06-25

## 2017-07-25 LAB
ABO + RH BLD: NORMAL
ALBUMIN SERPL BCP-MCNC: 2.6 G/DL
ALP SERPL-CCNC: 98 U/L
ALT SERPL W/O P-5'-P-CCNC: 32 U/L
ANION GAP SERPL CALC-SCNC: 13 MMOL/L
APTT BLDCRRT: 37.8 SEC
AST SERPL-CCNC: 22 U/L
BASOPHILS # BLD AUTO: 0.03 K/UL
BASOPHILS NFR BLD: 0.1 %
BILIRUB SERPL-MCNC: 2.5 MG/DL
BILIRUB UR QL STRIP: NEGATIVE
BLD GP AB SCN CELLS X3 SERPL QL: NORMAL
BNP SERPL-MCNC: 435 PG/ML
BUN SERPL-MCNC: 27 MG/DL
CALCIUM SERPL-MCNC: 8.4 MG/DL
CHLORIDE SERPL-SCNC: 98 MMOL/L
CLARITY UR: CLEAR
CO2 SERPL-SCNC: 19 MMOL/L
COLOR UR: ABNORMAL
CORTIS SERPL-MCNC: 24.3 UG/DL
CREAT SERPL-MCNC: 1.3 MG/DL
DIFFERENTIAL METHOD: ABNORMAL
EOSINOPHIL # BLD AUTO: 0 K/UL
EOSINOPHIL NFR BLD: 0 %
ERYTHROCYTE [DISTWIDTH] IN BLOOD BY AUTOMATED COUNT: 14.1 %
EST. GFR  (AFRICAN AMERICAN): >60 ML/MIN/1.73 M^2
EST. GFR  (NON AFRICAN AMERICAN): >60 ML/MIN/1.73 M^2
GLUCOSE SERPL-MCNC: 91 MG/DL
GLUCOSE UR QL STRIP: NEGATIVE
HCT VFR BLD AUTO: 41.9 %
HGB BLD-MCNC: 14.4 G/DL
HGB UR QL STRIP: ABNORMAL
HYALINE CASTS #/AREA URNS LPF: 4 /LPF
INR PPP: 1.2
KETONES UR QL STRIP: NEGATIVE
LACTATE SERPL-SCNC: 2.1 MMOL/L
LEUKOCYTE ESTERASE UR QL STRIP: NEGATIVE
LIPASE SERPL-CCNC: 9 U/L
LYMPHOCYTES # BLD AUTO: 2.5 K/UL
LYMPHOCYTES NFR BLD: 11.1 %
MAGNESIUM SERPL-MCNC: 1.3 MG/DL
MCH RBC QN AUTO: 32.4 PG
MCHC RBC AUTO-ENTMCNC: 34.4 G/DL
MCV RBC AUTO: 94 FL
MICROSCOPIC COMMENT: ABNORMAL
MONOCYTES # BLD AUTO: 2.7 K/UL
MONOCYTES NFR BLD: 12.1 %
NEUTROPHILS # BLD AUTO: 17 K/UL
NEUTROPHILS NFR BLD: 76.7 %
NITRITE UR QL STRIP: NEGATIVE
PH UR STRIP: 6 [PH] (ref 5–8)
PHOSPHATE SERPL-MCNC: 4.4 MG/DL
PLATELET # BLD AUTO: 225 K/UL
PMV BLD AUTO: 9.6 FL
POTASSIUM SERPL-SCNC: 4.7 MMOL/L
PROCALCITONIN SERPL IA-MCNC: 18.4 NG/ML
PROT SERPL-MCNC: 7.7 G/DL
PROT UR QL STRIP: ABNORMAL
PROTHROMBIN TIME: 12.9 SEC
RBC # BLD AUTO: 4.45 M/UL
RBC #/AREA URNS HPF: 3 /HPF (ref 0–4)
SODIUM SERPL-SCNC: 130 MMOL/L
SP GR UR STRIP: 1.01 (ref 1–1.03)
TROPONIN I SERPL DL<=0.01 NG/ML-MCNC: <0.006 NG/ML
TSH SERPL DL<=0.005 MIU/L-ACNC: 1.11 UIU/ML
URN SPEC COLLECT METH UR: ABNORMAL
UROBILINOGEN UR STRIP-ACNC: 1 EU/DL
WBC # BLD AUTO: 22.53 K/UL
WBC #/AREA URNS HPF: 3 /HPF (ref 0–5)

## 2017-07-25 PROCEDURE — 63600175 PHARM REV CODE 636 W HCPCS: Performed by: NURSE PRACTITIONER

## 2017-07-25 PROCEDURE — 85025 COMPLETE CBC W/AUTO DIFF WBC: CPT

## 2017-07-25 PROCEDURE — 84145 PROCALCITONIN (PCT): CPT

## 2017-07-25 PROCEDURE — 94640 AIRWAY INHALATION TREATMENT: CPT

## 2017-07-25 PROCEDURE — 81000 URINALYSIS NONAUTO W/SCOPE: CPT

## 2017-07-25 PROCEDURE — 96366 THER/PROPH/DIAG IV INF ADDON: CPT

## 2017-07-25 PROCEDURE — 96365 THER/PROPH/DIAG IV INF INIT: CPT

## 2017-07-25 PROCEDURE — 25000242 PHARM REV CODE 250 ALT 637 W/ HCPCS: Performed by: EMERGENCY MEDICINE

## 2017-07-25 PROCEDURE — 25000242 PHARM REV CODE 250 ALT 637 W/ HCPCS: Performed by: NURSE PRACTITIONER

## 2017-07-25 PROCEDURE — 83880 ASSAY OF NATRIURETIC PEPTIDE: CPT

## 2017-07-25 PROCEDURE — 63600175 PHARM REV CODE 636 W HCPCS: Performed by: EMERGENCY MEDICINE

## 2017-07-25 PROCEDURE — 84443 ASSAY THYROID STIM HORMONE: CPT

## 2017-07-25 PROCEDURE — 63600175 PHARM REV CODE 636 W HCPCS: Performed by: PHYSICIAN ASSISTANT

## 2017-07-25 PROCEDURE — 82533 TOTAL CORTISOL: CPT

## 2017-07-25 PROCEDURE — 83690 ASSAY OF LIPASE: CPT

## 2017-07-25 PROCEDURE — 25000003 PHARM REV CODE 250: Performed by: NURSE PRACTITIONER

## 2017-07-25 PROCEDURE — 83735 ASSAY OF MAGNESIUM: CPT

## 2017-07-25 PROCEDURE — 25000003 PHARM REV CODE 250: Performed by: EMERGENCY MEDICINE

## 2017-07-25 PROCEDURE — 94761 N-INVAS EAR/PLS OXIMETRY MLT: CPT

## 2017-07-25 PROCEDURE — 83605 ASSAY OF LACTIC ACID: CPT

## 2017-07-25 PROCEDURE — 86850 RBC ANTIBODY SCREEN: CPT

## 2017-07-25 PROCEDURE — 93005 ELECTROCARDIOGRAM TRACING: CPT

## 2017-07-25 PROCEDURE — 96375 TX/PRO/DX INJ NEW DRUG ADDON: CPT

## 2017-07-25 PROCEDURE — 96361 HYDRATE IV INFUSION ADD-ON: CPT

## 2017-07-25 PROCEDURE — 99285 EMERGENCY DEPT VISIT HI MDM: CPT | Mod: 25

## 2017-07-25 PROCEDURE — 85610 PROTHROMBIN TIME: CPT

## 2017-07-25 PROCEDURE — 99291 CRITICAL CARE FIRST HOUR: CPT | Mod: 25

## 2017-07-25 PROCEDURE — 25000003 PHARM REV CODE 250: Performed by: PHYSICIAN ASSISTANT

## 2017-07-25 PROCEDURE — 87186 SC STD MICRODIL/AGAR DIL: CPT

## 2017-07-25 PROCEDURE — 93010 ELECTROCARDIOGRAM REPORT: CPT | Mod: ,,, | Performed by: INTERNAL MEDICINE

## 2017-07-25 PROCEDURE — 80053 COMPREHEN METABOLIC PANEL: CPT

## 2017-07-25 PROCEDURE — 84484 ASSAY OF TROPONIN QUANT: CPT

## 2017-07-25 PROCEDURE — 87040 BLOOD CULTURE FOR BACTERIA: CPT

## 2017-07-25 PROCEDURE — 96367 TX/PROPH/DG ADDL SEQ IV INF: CPT

## 2017-07-25 PROCEDURE — 87077 CULTURE AEROBIC IDENTIFY: CPT

## 2017-07-25 PROCEDURE — 86900 BLOOD TYPING SEROLOGIC ABO: CPT

## 2017-07-25 PROCEDURE — 84100 ASSAY OF PHOSPHORUS: CPT

## 2017-07-25 PROCEDURE — 27000221 HC OXYGEN, UP TO 24 HOURS

## 2017-07-25 PROCEDURE — 85730 THROMBOPLASTIN TIME PARTIAL: CPT

## 2017-07-25 PROCEDURE — 11000001 HC ACUTE MED/SURG PRIVATE ROOM

## 2017-07-25 RX ORDER — ACETAMINOPHEN 500 MG
1000 TABLET ORAL
Status: COMPLETED | OUTPATIENT
Start: 2017-07-25 | End: 2017-07-25

## 2017-07-25 RX ORDER — IPRATROPIUM BROMIDE AND ALBUTEROL SULFATE 2.5; .5 MG/3ML; MG/3ML
3 SOLUTION RESPIRATORY (INHALATION)
Status: COMPLETED | OUTPATIENT
Start: 2017-07-25 | End: 2017-07-25

## 2017-07-25 RX ORDER — RAMELTEON 8 MG/1
8 TABLET ORAL NIGHTLY PRN
Status: DISCONTINUED | OUTPATIENT
Start: 2017-07-25 | End: 2017-08-01 | Stop reason: HOSPADM

## 2017-07-25 RX ORDER — CIPROFLOXACIN 2 MG/ML
400 INJECTION, SOLUTION INTRAVENOUS
Status: COMPLETED | OUTPATIENT
Start: 2017-07-25 | End: 2017-07-25

## 2017-07-25 RX ORDER — FLUTICASONE FUROATE AND VILANTEROL 200; 25 UG/1; UG/1
1 POWDER RESPIRATORY (INHALATION) DAILY
Status: DISCONTINUED | OUTPATIENT
Start: 2017-07-25 | End: 2017-08-01 | Stop reason: HOSPADM

## 2017-07-25 RX ORDER — CIPROFLOXACIN 2 MG/ML
400 INJECTION, SOLUTION INTRAVENOUS
Status: DISCONTINUED | OUTPATIENT
Start: 2017-07-25 | End: 2017-07-25

## 2017-07-25 RX ORDER — SODIUM CHLORIDE 9 MG/ML
INJECTION, SOLUTION INTRAVENOUS CONTINUOUS
Status: DISCONTINUED | OUTPATIENT
Start: 2017-07-25 | End: 2017-07-26

## 2017-07-25 RX ORDER — GABAPENTIN 300 MG/1
300 CAPSULE ORAL 3 TIMES DAILY
Status: DISCONTINUED | OUTPATIENT
Start: 2017-07-25 | End: 2017-07-25 | Stop reason: DRUGHIGH

## 2017-07-25 RX ORDER — MAGNESIUM SULFATE HEPTAHYDRATE 40 MG/ML
2 INJECTION, SOLUTION INTRAVENOUS ONCE
Status: COMPLETED | OUTPATIENT
Start: 2017-07-25 | End: 2017-07-25

## 2017-07-25 RX ORDER — SODIUM CHLORIDE 9 MG/ML
1000 INJECTION, SOLUTION INTRAVENOUS
Status: COMPLETED | OUTPATIENT
Start: 2017-07-25 | End: 2017-07-25

## 2017-07-25 RX ORDER — CEFEPIME HYDROCHLORIDE 2 G/50ML
2 INJECTION, SOLUTION INTRAVENOUS
Status: DISCONTINUED | OUTPATIENT
Start: 2017-07-25 | End: 2017-07-28

## 2017-07-25 RX ORDER — ACETAMINOPHEN 325 MG/1
650 TABLET ORAL EVERY 6 HOURS PRN
Status: DISCONTINUED | OUTPATIENT
Start: 2017-07-25 | End: 2017-08-01 | Stop reason: HOSPADM

## 2017-07-25 RX ORDER — ONDANSETRON 2 MG/ML
4 INJECTION INTRAMUSCULAR; INTRAVENOUS
Status: COMPLETED | OUTPATIENT
Start: 2017-07-25 | End: 2017-07-25

## 2017-07-25 RX ORDER — GABAPENTIN 300 MG/1
300 CAPSULE ORAL 2 TIMES DAILY
Status: DISCONTINUED | OUTPATIENT
Start: 2017-07-25 | End: 2017-08-01 | Stop reason: HOSPADM

## 2017-07-25 RX ORDER — IPRATROPIUM BROMIDE AND ALBUTEROL SULFATE 2.5; .5 MG/3ML; MG/3ML
3 SOLUTION RESPIRATORY (INHALATION) EVERY 4 HOURS
Status: DISCONTINUED | OUTPATIENT
Start: 2017-07-25 | End: 2017-07-26

## 2017-07-25 RX ORDER — IPRATROPIUM BROMIDE AND ALBUTEROL SULFATE 2.5; .5 MG/3ML; MG/3ML
3 SOLUTION RESPIRATORY (INHALATION) EVERY 6 HOURS PRN
COMMUNITY

## 2017-07-25 RX ORDER — CEFEPIME HYDROCHLORIDE 2 G/50ML
2 INJECTION, SOLUTION INTRAVENOUS
Status: COMPLETED | OUTPATIENT
Start: 2017-07-25 | End: 2017-07-25

## 2017-07-25 RX ADMIN — GABAPENTIN 300 MG: 300 CAPSULE ORAL at 10:07

## 2017-07-25 RX ADMIN — ACETAMINOPHEN 650 MG: 325 TABLET ORAL at 04:07

## 2017-07-25 RX ADMIN — ACETAMINOPHEN 650 MG: 325 TABLET ORAL at 07:07

## 2017-07-25 RX ADMIN — VANCOMYCIN HYDROCHLORIDE 1000 MG: 1 INJECTION, POWDER, LYOPHILIZED, FOR SOLUTION INTRAVENOUS at 07:07

## 2017-07-25 RX ADMIN — MAGNESIUM SULFATE IN WATER 2 G: 40 INJECTION, SOLUTION INTRAVENOUS at 08:07

## 2017-07-25 RX ADMIN — FLUTICASONE FUROATE AND VILANTEROL TRIFENATATE 1 PUFF: 200; 25 POWDER RESPIRATORY (INHALATION) at 11:07

## 2017-07-25 RX ADMIN — IPRATROPIUM BROMIDE AND ALBUTEROL SULFATE 3 ML: .5; 3 SOLUTION RESPIRATORY (INHALATION) at 08:07

## 2017-07-25 RX ADMIN — IPRATROPIUM BROMIDE AND ALBUTEROL SULFATE 3 ML: .5; 3 SOLUTION RESPIRATORY (INHALATION) at 03:07

## 2017-07-25 RX ADMIN — SODIUM CHLORIDE 1000 ML: 0.9 INJECTION, SOLUTION INTRAVENOUS at 05:07

## 2017-07-25 RX ADMIN — CIPROFLOXACIN 400 MG: 2 INJECTION, SOLUTION INTRAVENOUS at 05:07

## 2017-07-25 RX ADMIN — SODIUM CHLORIDE: 0.9 INJECTION, SOLUTION INTRAVENOUS at 04:07

## 2017-07-25 RX ADMIN — IPRATROPIUM BROMIDE AND ALBUTEROL SULFATE 3 ML: .5; 3 SOLUTION RESPIRATORY (INHALATION) at 11:07

## 2017-07-25 RX ADMIN — GABAPENTIN 300 MG: 300 CAPSULE ORAL at 09:07

## 2017-07-25 RX ADMIN — ONDANSETRON 4 MG: 2 INJECTION INTRAMUSCULAR; INTRAVENOUS at 03:07

## 2017-07-25 RX ADMIN — CEFEPIME HYDROCHLORIDE 2 G: 2 INJECTION, SOLUTION INTRAVENOUS at 04:07

## 2017-07-25 RX ADMIN — SODIUM CHLORIDE: 0.9 INJECTION, SOLUTION INTRAVENOUS at 07:07

## 2017-07-25 RX ADMIN — ACETAMINOPHEN 1000 MG: 500 TABLET ORAL at 02:07

## 2017-07-25 RX ADMIN — IPRATROPIUM BROMIDE AND ALBUTEROL SULFATE 3 ML: .5; 3 SOLUTION RESPIRATORY (INHALATION) at 04:07

## 2017-07-25 NOTE — PROGRESS NOTES
[facility]  Chart Review Note    Christiano Baer is a 56 y.o. black man with bullous emphysema due to cigarette smoking, chronic hypoxic respiratory failure (on 2 liters nasal cannula), history of tuberculosis, history of alcohol abuse with alcoholic pancreatitis on 2/23/14.  He lives in [place], [place].      He was hospitalized at [facility] from 6/23/17 to 6/26/17 for right middle and lower lung pneumonia treated with moxifloxacin.  He was hypotensive on amlodipine, hydrochlorothiazide, and valsartan, which were discontinued.  He was discharged to [facility] skilled nursing facility.      He returned to [facility] on 7/25/17 with nausea, shortness of breath, cough, pleuritic chest pain worsened by coughing, headache, and generalized weakness.  Initially his oxygen saturation was as low as 89% on 3 liters nasal cannula.  He was given albuterol-ipratropium nebulizer treatment and oxygen saturation improved to high 90s on 2 liters.  He was also tachycardic (pulse up to 140) and hypotensive (blood pressure as low as 83/54), which improved after being given IV fluids.  Temperature was 100.1 F.  WBC count was 22,530.  Chest x-ray showed increased lateral right pleural thickening.  He was given cefepime, vancomycin, and ciprofloxacin.  He was admitted to [facility].

## 2017-07-25 NOTE — PROGRESS NOTES
Vanmcomycin dose adjustment 750mg iv q24h based on weight(45.4 kg) and renal function(CrCl 39.8 ml/min) with vancomycin trough prior to 4th dose(7/28 at 0700) per dosing guidelines

## 2017-07-25 NOTE — PROGRESS NOTES
V/O R. Mansoor JAIME Adjust dose Gabapentin to 300mg po bid based on renal function(CrCl 39.8 ml/min) per dosing guidelines

## 2017-07-25 NOTE — ASSESSMENT & PLAN NOTE
Pulmonary hypotension  Pt does not appear to be overloaded but instead dehydrated. . No rales or edema on exam.  Monitor I&O and weight.

## 2017-07-25 NOTE — ASSESSMENT & PLAN NOTE
Pneumonia of right lung   Pt with recent admission for pneumonia discharged to [facility] presents with fever, fatigue, dyspnea and hypoxia.  WBC 22K, temp 100.1, procalcitonin elevated at 18.  CXR: increased lateral right pleural thickening.  Blood cultures collected. Given cefepime, cipro and vanco in ED. Continue cefepime and vanco, day #1 of 7 for healthcare associated pneumonia. Supportive care with nebs, antipyretics, analgesia and supplemental oxygen. BP much improved with IVF. Monitor.

## 2017-07-25 NOTE — ASSESSMENT & PLAN NOTE
Volume depletion  Appears baseline creatinine is < 1.0. 1.3 today likely 2/2 infection and dehydration. Continue IVF. Monitor. Avoid nephrotoxins and renally dose meds.

## 2017-07-25 NOTE — ED TRIAGE NOTES
Pt. To ER via EMS with c/o nausea, shortness of breath and generalized weakness. Pt. Placed on cardiac monitor, BP, and continuous pulse oximetry monitoring. Pt. Wears 2 L NC at home for COPD. Pt. States he was in the hospital 3 weeks ago for pneumonia and he is currently living at the nursing home for rehab. Pt. States that he has pain when he coughs. Flasher provided to pt. To help support his chest when he coughs.

## 2017-07-25 NOTE — ASSESSMENT & PLAN NOTE
COPD exacerbation, chronic bullous emphysema, supplemental oxygen dependent   Noted to be dyspneic and O2 sat 89% on home O2 in ED.  Improving.  Continue supplemental oxygen. Monitor.  Continue Breo, nebs.

## 2017-07-25 NOTE — ASSESSMENT & PLAN NOTE
Pt states that he stopped smoking after last hospitalization. Encouraged to continue with cessation.

## 2017-07-25 NOTE — ED NOTES
Respiratory therapist reports that patient has coughed up about a teaspoon of bright red blood in napkin; Dr. Cox notified of occurrence and stated to monitor blood count and for future episodes; no new orders placed at this time.

## 2017-07-25 NOTE — SUBJECTIVE & OBJECTIVE
Past Medical History:   Diagnosis Date    Alcoholic pancreatitis 02/23/2014    COPD (chronic obstructive pulmonary disease)     ETOH abuse     Tuberculosis 2006       Past Surgical History:   Procedure Laterality Date    CHEST TUBE INSERTION  November 2013       Review of patient's allergies indicates:  No Known Allergies    No current facility-administered medications on file prior to encounter.      Current Outpatient Prescriptions on File Prior to Encounter   Medication Sig    gabapentin (NEURONTIN) 300 MG capsule Take 300 mg by mouth 3 (three) times daily.    lactobacillus acidophilus & bulgar (LACTINEX) 100 million cell packet Take 1 packet (1 each total) by mouth once daily.    mometasone-formoterol (DULERA) 200-5 mcg/actuation inhaler Inhale 2 puffs into the lungs 2 (two) times daily. Controller    albuterol 90 mcg/actuation inhaler Inhale 1-2 puffs into the lungs every 6 (six) hours as needed for Wheezing.    [DISCONTINUED] albuterol-ipratropium 2.5mg-0.5mg/3mL (DUO-NEB) 0.5 mg-3 mg(2.5 mg base)/3 mL nebulizer solution Take 3 mLs by nebulization every 6 (six) hours as needed for Wheezing or Shortness of Breath. Rescue    [DISCONTINUED] moxifloxacin (AVELOX) 400 mg tablet Take 1 tablet (400 mg total) by mouth once daily. STOP DATE 6/30/17     Family History     None        Social History Main Topics    Smoking status: Former Smoker     Packs/day: 1.00     Types: Cigarettes    Smokeless tobacco: Never Used    Alcohol use No      Comment: former alcoholic     Drug use: No    Sexual activity: Not on file     Review of Systems   Unable to perform ROS: Psychiatric disorder     Objective:     Vital Signs (Most Recent):  Temp: 99 °F (37.2 °C) (07/25/17 1444)  Pulse: 99 (07/25/17 1608)  Resp: 17 (07/25/17 1608)  BP: 136/74 (07/25/17 1444)  SpO2: 96 % (07/25/17 1608) Vital Signs (24h Range):  Temp:  [99 °F (37.2 °C)-100.1 °F (37.8 °C)] 99 °F (37.2 °C)  Pulse:  [] 99  Resp:  [14-24] 17  SpO2:  [89  %-100 %] 96 %  BP: ()/(50-78) 136/74     Weight: 45.4 kg (100 lb)  Body mass index is 14.77 kg/m².    Physical Exam   Constitutional: He appears well-developed and well-nourished. No distress.   HENT:   Head: Normocephalic and atraumatic.   Mouth/Throat: No oropharyngeal exudate.   Dry mucous membranes    Eyes: Conjunctivae and EOM are normal. Pupils are equal, round, and reactive to light. No scleral icterus.   Neck: Normal range of motion. Neck supple. No JVD present. No thyromegaly present.   Cardiovascular: Normal rate and regular rhythm.    No murmur heard.  Pulmonary/Chest: No respiratory distress. He has no wheezes. He exhibits no tenderness.   Taking deep breaths throughout my interview and exam.  Diminished breath sounds throughout with faint rub heard at lung bases.    Abdominal: Soft. Bowel sounds are normal. He exhibits no distension. There is no tenderness. There is no rebound and no guarding.   Musculoskeletal: Normal range of motion. He exhibits no edema or deformity.   Neurological: He is alert.   Skin: Skin is warm and dry. No rash noted. He is not diaphoretic.   Psychiatric:   Pt appears anxious and has tangential thoughts and speech.    Nursing note and vitals reviewed.       Significant Labs:   CBC:   Recent Labs  Lab 07/25/17 0255   WBC 22.53*   HGB 14.4   HCT 41.9        CMP:   Recent Labs  Lab 07/25/17  0255   *   K 4.7   CL 98   CO2 19*   GLU 91   BUN 27*   CREATININE 1.3   CALCIUM 8.4*   PROT 7.7   ALBUMIN 2.6*   BILITOT 2.5*   ALKPHOS 98   AST 22   ALT 32   ANIONGAP 13   EGFRNONAA >60     Cardiac Markers:   Recent Labs  Lab 07/25/17  0255   *     Lactic Acid:   Recent Labs  Lab 07/25/17  0255   LACTATE 2.1     Urine Studies:   Recent Labs  Lab 07/25/17  0451   COLORU [place]*   APPEARANCEUA Clear   PHUR 6.0   SPECGRAV 1.015   PROTEINUA Trace*   GLUCUA Negative   KETONESU Negative   BILIRUBINUA Negative   OCCULTUA 1+*   NITRITE Negative   UROBILINOGEN 1.0    LEUKOCYTESUR Negative   RBCUA 3   WBCUA 3   HYALINECASTS 4*     Procalcitonin: 18.4     Significant Imaging:     Imaging Results          X-Ray Chest AP Portable (Final result)  Result time 07/25/17 03:26:47    Final result by Zak Ulloa MD (07/25/17 03:26:47)                 Impression:        Marked architectural distortion/scarring in the RIGHT hemithorax similar to prior except for increased pleural thickening laterally. Remainder of the exam appears unchanged.        Electronically signed by: ZAK ULLOA MD  Date:     07/25/17  Time:    03:26              Narrative:    Chest AP portable    Indication:sepsis.    Comparison:June 23, 2017.    Findings:     Marked architectural distortion/scarring in the RIGHT hemithorax similar to prior except for increased pleural thickening laterally. LEFT lung is hyperinflated with emphysematous change but clear acute disease. Heart size is stable and mediastinum remain shifted slightly to the RIGHT, unchanged.

## 2017-07-25 NOTE — PLAN OF CARE
Problem: Patient Care Overview  Goal: Plan of Care Review  Outcome: Ongoing (interventions implemented as appropriate)  Pt on oxygen in no apparent distress.  Breathing tx. Given with ok pt. Effort.  Will cont. To monitor.

## 2017-07-25 NOTE — ASSESSMENT & PLAN NOTE
No GI complaints at present though patient c/o nausea with coughing. No elevation in AST or ALT. Monitor.

## 2017-07-25 NOTE — ED NOTES
Pt made aware of POC to be admitted to the hospital for IV antibiotics; yellow socks placed on patient due to being a fall risk; bedside commode placed at bedside; pt notified to call to get up to bedside commode for assistance; call bell placed with pt.

## 2017-07-25 NOTE — ED NOTES
Received report from TITO Bautista. Patient resting in bed quietly. Pt came to the ED with complaints of nausea, vomiting, chills, feeling febrile, body aches, and a non productive cough since Saturday. Pt states that he was put in a nursing home after being discharged from the hospital for pneumonia. Pt states that he is having a headache at this time and rates his pain a 7/10-tylenol was administered. Pt is on 2L NC and O2 sats are 99%.

## 2017-07-25 NOTE — H&P
[facility]  History & Physical    Patient Name: Christiano Baer  MRN: [id number]  Admission Date: 7/25/2017  Attending Physician: Kenan Cox MD   Primary Care Provider: Primary Doctor No         Patient information was obtained from patient, nursing home, past medical records and ER records.     Subjective:     Principal Problem:Sepsis with hypotension    Chief Complaint:   Chief Complaint   Patient presents with    Emesis     Nausea and vomiting x2 days, shortness of breath, generalized body aches        HPI: Christiano Baer is a 56 y.o. black man with bullous emphysema due to cigarette smoking, chronic hypoxic respiratory failure (on 2 liters nasal cannula), history of tuberculosis, history of alcohol abuse with alcoholic pancreatitis on 2/23/14.  He  currently lives at [facility].         He was hospitalized at [facility] from 6/23/17 to 6/26/17 for right middle and lower lung pneumonia treated with moxifloxacin.  He was hypotensive on amlodipine, hydrochlorothiazide, and valsartan, which were discontinued at that time.  He was discharged to [facility] skilled nursing facility.       He returned to [facility] on 7/25/17 with nausea, shortness of breath, cough, pleuritic chest pain worsened by coughing, headache, and generalized weakness.  Initially his oxygen saturation was as low as 89% on 3 liters nasal cannula.  He was given albuterol-ipratropium nebulizer treatment and oxygen saturation improved to high 90s on 2 liters.  He was also tachycardic (pulse up to 140) and hypotensive (blood pressure as low as 83/54), which improved after being given IV fluids.  Temperature was 100.1 F.  WBC count was 22,530.   Procalcitonin elevated at 18. Total bilirubin elevated at 2.5.  BNP elevated at 425.  Chest x-ray showed increased lateral right pleural thickening. Blood cultures were collected and he was given  cefepime, vancomycin, and ciprofloxacin.   Admitting to [facility].      Patient is a poor historian and appears anxious during my interview and exam.  He currently reports leg pain and fatigue.  He denies CP, SOB, N/V.  States he stopped smoking after the last hospitalization.      Past Medical History:   Diagnosis Date    Alcoholic pancreatitis 02/23/2014    COPD (chronic obstructive pulmonary disease)     ETOH abuse     Tuberculosis 2006       Past Surgical History:   Procedure Laterality Date    CHEST TUBE INSERTION  November 2013       Review of patient's allergies indicates:  No Known Allergies    No current facility-administered medications on file prior to encounter.      Current Outpatient Prescriptions on File Prior to Encounter   Medication Sig    gabapentin (NEURONTIN) 300 MG capsule Take 300 mg by mouth 3 (three) times daily.    lactobacillus acidophilus & bulgar (LACTINEX) 100 million cell packet Take 1 packet (1 each total) by mouth once daily.    mometasone-formoterol (DULERA) 200-5 mcg/actuation inhaler Inhale 2 puffs into the lungs 2 (two) times daily. Controller    albuterol 90 mcg/actuation inhaler Inhale 1-2 puffs into the lungs every 6 (six) hours as needed for Wheezing.    [DISCONTINUED] albuterol-ipratropium 2.5mg-0.5mg/3mL (DUO-NEB) 0.5 mg-3 mg(2.5 mg base)/3 mL nebulizer solution Take 3 mLs by nebulization every 6 (six) hours as needed for Wheezing or Shortness of Breath. Rescue    [DISCONTINUED] moxifloxacin (AVELOX) 400 mg tablet Take 1 tablet (400 mg total) by mouth once daily. STOP DATE 6/30/17     Family History     None        Social History Main Topics    Smoking status: Former Smoker     Packs/day: 1.00     Types: Cigarettes    Smokeless tobacco: Never Used    Alcohol use No      Comment: former alcoholic     Drug use: No    Sexual activity: Not on file     Review of Systems   Unable to perform ROS: Psychiatric disorder     Objective:     Vital Signs  (Most Recent):  Temp: 99 °F (37.2 °C) (07/25/17 1444)  Pulse: 99 (07/25/17 1608)  Resp: 17 (07/25/17 1608)  BP: 136/74 (07/25/17 1444)  SpO2: 96 % (07/25/17 1608) Vital Signs (24h Range):  Temp:  [99 °F (37.2 °C)-100.1 °F (37.8 °C)] 99 °F (37.2 °C)  Pulse:  [] 99  Resp:  [14-24] 17  SpO2:  [89 %-100 %] 96 %  BP: ()/(50-78) 136/74     Weight: 45.4 kg (100 lb)  Body mass index is 14.77 kg/m².    Physical Exam   Constitutional: He appears well-developed and well-nourished. No distress.   HENT:   Head: Normocephalic and atraumatic.   Mouth/Throat: No oropharyngeal exudate.   Dry mucous membranes    Eyes: Conjunctivae and EOM are normal. Pupils are equal, round, and reactive to light. No scleral icterus.   Neck: Normal range of motion. Neck supple. No JVD present. No thyromegaly present.   Cardiovascular: Normal rate and regular rhythm.    No murmur heard.  Pulmonary/Chest: No respiratory distress. He has no wheezes. He exhibits no tenderness.   Taking deep breaths throughout my interview and exam.  Diminished breath sounds throughout with faint rub heard at lung bases.    Abdominal: Soft. Bowel sounds are normal. He exhibits no distension. There is no tenderness. There is no rebound and no guarding.   Musculoskeletal: Normal range of motion. He exhibits no edema or deformity.   Neurological: He is alert.   Skin: Skin is warm and dry. No rash noted. He is not diaphoretic.   Psychiatric:   Pt appears anxious and has tangential thoughts and speech.    Nursing note and vitals reviewed.       Significant Labs:   CBC:   Recent Labs  Lab 07/25/17  0255   WBC 22.53*   HGB 14.4   HCT 41.9        CMP:   Recent Labs  Lab 07/25/17  0255   *   K 4.7   CL 98   CO2 19*   GLU 91   BUN 27*   CREATININE 1.3   CALCIUM 8.4*   PROT 7.7   ALBUMIN 2.6*   BILITOT 2.5*   ALKPHOS 98   AST 22   ALT 32   ANIONGAP 13   EGFRNONAA >60     Cardiac Markers:   Recent Labs  Lab 07/25/17  0255   *     Lactic Acid:   Recent  Labs  Lab 07/25/17  0255   LACTATE 2.1     Urine Studies:   Recent Labs  Lab 07/25/17  0451   COLORU [place]*   APPEARANCEUA Clear   PHUR 6.0   SPECGRAV 1.015   PROTEINUA Trace*   GLUCUA Negative   KETONESU Negative   BILIRUBINUA Negative   OCCULTUA 1+*   NITRITE Negative   UROBILINOGEN 1.0   LEUKOCYTESUR Negative   RBCUA 3   WBCUA 3   HYALINECASTS 4*     Procalcitonin: 18.4     Significant Imaging:     Imaging Results          X-Ray Chest AP Portable (Final result)  Result time 07/25/17 03:26:47    Final result by Zak Ulloa MD (07/25/17 03:26:47)                 Impression:        Marked architectural distortion/scarring in the RIGHT hemithorax similar to prior except for increased pleural thickening laterally. Remainder of the exam appears unchanged.        Electronically signed by: ZAK ULLOA MD  Date:     07/25/17  Time:    03:26              Narrative:    Chest AP portable    Indication:sepsis.    Comparison:June 23, 2017.    Findings:     Marked architectural distortion/scarring in the RIGHT hemithorax similar to prior except for increased pleural thickening laterally. LEFT lung is hyperinflated with emphysematous change but clear acute disease. Heart size is stable and mediastinum remain shifted slightly to the RIGHT, unchanged.                                Assessment/Plan:     * Sepsis with hypotension    Pneumonia of right lung   Pt with recent admission for pneumonia discharged to [facility] presents with fever, fatigue, dyspnea and hypoxia.  WBC 22K, temp 100.1, procalcitonin elevated at 18.  CXR: increased lateral right pleural thickening.  Blood cultures collected. Given cefepime, cipro and vanco in ED. Continue cefepime and vanco, day #1 of 7 for healthcare associated pneumonia. Supportive care with nebs, antipyretics, analgesia and supplemental oxygen. BP much improved with IVF. Monitor.           Acute on chronic respiratory failure with hypoxia    COPD  exacerbation, chronic bullous emphysema, supplemental oxygen dependent   Noted to be dyspneic and O2 sat 89% on home O2 in ED.  Improving.  Continue supplemental oxygen. Monitor.  Continue Breo, nebs.            MARLENA (acute kidney injury)    Volume depletion  Appears baseline creatinine is < 1.0. 1.3 today likely 2/2 infection and dehydration. Continue IVF. Monitor. Avoid nephrotoxins and renally dose meds.           Hyperbilirubinemia    No GI complaints at present though patient c/o nausea with coughing. No elevation in AST or ALT. Monitor.           Diastolic dysfunction    Pulmonary hypotension  Pt does not appear to be overloaded but instead dehydrated. . No rales or edema on exam.  Monitor I&O and weight.           Hypomagnesemia    Hyponatremia  Likely 2/2 poor PO intake. Given mag rider in ED. Started on IVF. Monitor.           Tobacco abuse    Pt states that he stopped smoking after last hospitalization. Encouraged to continue with cessation.             VTE Risk Mitigation         Ordered     Medium Risk of VTE  Once      07/25/17 0727     Place sequential compression device  Until discontinued      07/25/17 0727     Place MARSHA hose  Until discontinued      07/25/17 0727        Maricel Max PA-C  Department of Hospital Medicine   [facility]  Pager: [phone]    Attending: Kenan Cox MD

## 2017-07-25 NOTE — ED PROVIDER NOTES
"Encounter Date: 7/25/2017       History     Chief Complaint   Patient presents with    Emesis     Nausea and vomiting x2 days, shortness of breath, generalized body aches     57 y/o male with PMHx of copd and etoh abuse presents with fever, chills, n/v, sob and cough x 2 days.  Pt has a hx of COPD and was recently admitted to the hospital for pneumonia.  He has been on oxygen continuously since d/c from the hospital.  Pt has not taken anything for fever or breathing tx pta.  He was treated with IVF by EMS.  Pt reports his chest feels tight because he can't breath.  Sob is worsened with talking.  No relieving factors.  + nausea/vomiting x several episodes.  No abd pain or back pain.            Review of patient's allergies indicates:  No Known Allergies  Past Medical History:   Diagnosis Date    COPD (chronic obstructive pulmonary disease)     ETOH abuse      Past Surgical History:   Procedure Laterality Date    CHEST TUBE INSERTION  November 2013     History reviewed. No pertinent family history.  Social History   Substance Use Topics    Smoking status: Current Every Day Smoker     Packs/day: 1.00     Types: Cigarettes    Smokeless tobacco: Not on file    Alcohol use Yes      Comment: 2 pints of vodka last night     Review of Systems   Constitutional: Positive for activity change, appetite change, chills and fever.   HENT: Negative for congestion and sore throat.    Eyes: Negative for pain and redness.   Respiratory: Positive for cough, chest tightness, shortness of breath and wheezing.    Cardiovascular: Negative for palpitations and leg swelling.   Gastrointestinal: Positive for nausea and vomiting. Negative for abdominal pain and diarrhea.   Endocrine: Negative for polydipsia and polyphagia.   Genitourinary: Negative for flank pain.   Musculoskeletal: Positive for myalgias.        "body aches all over."   Skin: Negative for pallor and rash.   Allergic/Immunologic: Negative for immunocompromised state. "   Neurological: Negative for dizziness.        + generalized weakness   Hematological: Does not bruise/bleed easily.   Psychiatric/Behavioral: Negative for agitation and confusion.   All other systems reviewed and are negative.      Physical Exam     Initial Vitals [07/25/17 0223]   BP Pulse Resp Temp SpO2   128/78 (!) 127 (!) 22 100.1 °F (37.8 °C) 96 %      MAP       94.67         Physical Exam    Nursing note and vitals reviewed.  Constitutional: He appears well-developed and well-nourished. He appears distressed.   55 Y/O MALE WITH FEVER, TACHYCARDIA, TACHYPNEA AND RESP DISTRESS UPON ARRIVAL COVERED IN EMESIS   HENT:   Head: Normocephalic and atraumatic.   Eyes: Conjunctivae and EOM are normal.   Neck: Normal range of motion. Neck supple.   Cardiovascular: Normal rate, regular rhythm and normal heart sounds. Exam reveals no gallop and no friction rub.    No murmur heard.  Pulmonary/Chest: He is in respiratory distress. He has wheezes. He has no rhonchi. He has rales.   Abdominal: Soft. Bowel sounds are normal. He exhibits no distension. There is no tenderness.   Musculoskeletal: Normal range of motion. He exhibits no edema or tenderness.   Neurological: He is alert and oriented to person, place, and time.   Skin: Skin is warm and dry.   Psychiatric: He has a normal mood and affect. His behavior is normal. Judgment and thought content normal.         ED Course   Procedures  Labs Reviewed   CULTURE, BLOOD   CULTURE, BLOOD   APTT   B-TYPE NATRIURETIC PEPTIDE   CBC W/ AUTO DIFFERENTIAL   COMPREHENSIVE METABOLIC PANEL   CORTISOL, RANDOM   LACTIC ACID, PLASMA   LACTIC ACID, PLASMA   LIPASE   MAGNESIUM   PHOSPHORUS   PROTIME-INR   PROCALCITONIN   TROPONIN I   TSH   URINALYSIS   TYPE & SCREEN     EKG Readings: (Independently Interpreted)   Initial Reading: No STEMI. Rhythm: Sinus Tachycardia. Heart Rate: 134. Ectopy: No Ectopy. Clinical Impression: Sinus Tachycardia       X-Rays:   Independently Interpreted Readings:    Chest X-Ray: NO ACUTE CHANGE COMPARED TO MOST RECENT CXR UPON D/C FROM HOSPITAL     Medical Decision Making:   Initial Assessment:   55 y/o male with PMHx of copd and etoh abuse presents with fever, chills, n/v, sob and cough x 2 days.  Pt has a hx of COPD and was recently admitted to the hospital for pneumonia.  He has been on oxygen continuously since d/c from the hospital.  Pt has not taken anything for fever or breathing tx pta.  He was treated with IVF by EMS.  Pt reports his chest feels tight because he can't breath.  Sob is worsened with talking.  No relieving factors.  + nausea/vomiting x several episodes.  No abd pain or back pain.            Differential Diagnosis:   DDX:  PNEUMONIA, SEPSIS, CHF, COPD EXACERBATION, STEMI, METABOLIC DERANGEMENT, ANEMIA  Independently Interpreted Test(s):   I have ordered and independently interpreted EKG Reading(s) - see prior notes  Clinical Tests:   Lab Tests: Ordered and Reviewed  The following lab test(s) were unremarkable: Lactate       <> Summary of Lab: LEUKOCYTOSIS    Radiological Study: Ordered and Reviewed  Medical Tests: Ordered and Reviewed  ED Management:  DUONEB, TYLENOL AND IV ZOFRAN GIVEN UPON ARRIVAL.  SEPSIS WORK UP INITIATED AND IV ABX ORDERED.  PT HAS IMPROVED AFTER TX IN ED.  I HAVE CONSULTED HOSPITALIST AND THEY WILL ADMIT FOR FURTHER TX.   Other:   I have discussed this case with another health care provider.                   ED Course     Clinical Impression:   The encounter diagnosis was Sepsis, due to unspecified organism.    Disposition:   Disposition: Admitted  Condition: Critical                        Lisha Knight MD  07/25/17 1935

## 2017-07-25 NOTE — HPI
Christiano Baer is a 56 y.o.male with bullous emphysema due to cigarette smoking (stopped smoking on 6/23/17), chronic hypoxic respiratory failure (on 2 liters nasal cannula), history of tuberculosis, history of alcohol abuse with alcoholic pancreatitis on 2/23/14.  He lives in [place], [place].     He was hospitalized at [facility] from 6/23/17 to 6/26/17 for right middle and lower lung pneumonia treated with moxifloxacin.  He was hypotensive on amlodipine, hydrochlorothiazide, and valsartan, which were discontinued at that time.  He was discharged to [facility] skilled nursing [facility].       He returned to [facility] on 7/25/17 with nausea, shortness of breath, cough, pleuritic chest pain worsened by coughing, headache, and generalized weakness.  Initially his oxygen saturation was as low as 89% on 3 liters nasal cannula.  He was given albuterol-ipratropium nebulizer treatment and oxygen saturation improved to high 90s on 2 liters.  He was also tachycardic (pulse up to 140) and hypotensive (blood pressure as low as 83/54), which improved after being given IV fluids.  Temperature was 100.1 F.  WBC count was 22,530.  Procalcitonin elevated at 18. Total bilirubin elevated at 2.5.  BNP elevated at 425.  Chest x-ray showed increased lateral right pleural thickening. Blood cultures were collected and he was given cefepime, vancomycin, and ciprofloxacin.  He was admitted to [facility].  He complained of leg pain and fatigue.  He is a poor historian and appeared anxious during on admission.

## 2017-07-26 PROBLEM — B95.61 STAPHYLOCOCCUS AUREUS BACTEREMIA: Status: ACTIVE | Noted: 2017-06-23

## 2017-07-26 PROBLEM — R78.81 STAPHYLOCOCCUS AUREUS BACTEREMIA: Status: ACTIVE | Noted: 2017-06-23

## 2017-07-26 PROBLEM — E86.9 VOLUME DEPLETION: Status: RESOLVED | Noted: 2017-06-23 | Resolved: 2017-07-26

## 2017-07-26 PROBLEM — E87.1 HYPONATREMIA: Status: RESOLVED | Noted: 2017-06-23 | Resolved: 2017-07-26

## 2017-07-26 PROBLEM — D72.829 LEUKOCYTOSIS: Status: ACTIVE | Noted: 2017-07-26

## 2017-07-26 LAB
ANION GAP SERPL CALC-SCNC: 7 MMOL/L
BASOPHILS # BLD AUTO: 0.01 K/UL
BASOPHILS NFR BLD: 0.1 %
BUN SERPL-MCNC: 17 MG/DL
CALCIUM SERPL-MCNC: 8.3 MG/DL
CHLORIDE SERPL-SCNC: 107 MMOL/L
CO2 SERPL-SCNC: 21 MMOL/L
CREAT SERPL-MCNC: 0.8 MG/DL
DIFFERENTIAL METHOD: ABNORMAL
EOSINOPHIL # BLD AUTO: 0.1 K/UL
EOSINOPHIL NFR BLD: 0.3 %
ERYTHROCYTE [DISTWIDTH] IN BLOOD BY AUTOMATED COUNT: 14.3 %
EST. GFR  (AFRICAN AMERICAN): >60 ML/MIN/1.73 M^2
EST. GFR  (NON AFRICAN AMERICAN): >60 ML/MIN/1.73 M^2
GLUCOSE SERPL-MCNC: 79 MG/DL
HCT VFR BLD AUTO: 37.6 %
HGB BLD-MCNC: 12.6 G/DL
LYMPHOCYTES # BLD AUTO: 2 K/UL
LYMPHOCYTES NFR BLD: 11.2 %
MAGNESIUM SERPL-MCNC: 1.8 MG/DL
MCH RBC QN AUTO: 32.8 PG
MCHC RBC AUTO-ENTMCNC: 33.5 G/DL
MCV RBC AUTO: 98 FL
MONOCYTES # BLD AUTO: 2.4 K/UL
MONOCYTES NFR BLD: 13.5 %
NEUTROPHILS # BLD AUTO: 13.3 K/UL
NEUTROPHILS NFR BLD: 74.9 %
PHOSPHATE SERPL-MCNC: 2.4 MG/DL
PLATELET # BLD AUTO: 179 K/UL
PLATELET BLD QL SMEAR: ABNORMAL
PMV BLD AUTO: 9.9 FL
POTASSIUM SERPL-SCNC: 4.4 MMOL/L
RBC # BLD AUTO: 3.84 M/UL
SODIUM SERPL-SCNC: 135 MMOL/L
WBC # BLD AUTO: 17.69 K/UL

## 2017-07-26 PROCEDURE — 87070 CULTURE OTHR SPECIMN AEROBIC: CPT

## 2017-07-26 PROCEDURE — 25000242 PHARM REV CODE 250 ALT 637 W/ HCPCS: Performed by: NURSE PRACTITIONER

## 2017-07-26 PROCEDURE — 87077 CULTURE AEROBIC IDENTIFY: CPT

## 2017-07-26 PROCEDURE — 25000003 PHARM REV CODE 250: Performed by: PHYSICIAN ASSISTANT

## 2017-07-26 PROCEDURE — 87040 BLOOD CULTURE FOR BACTERIA: CPT

## 2017-07-26 PROCEDURE — 94640 AIRWAY INHALATION TREATMENT: CPT

## 2017-07-26 PROCEDURE — 63600175 PHARM REV CODE 636 W HCPCS: Performed by: PHYSICIAN ASSISTANT

## 2017-07-26 PROCEDURE — 25000242 PHARM REV CODE 250 ALT 637 W/ HCPCS: Performed by: PHYSICIAN ASSISTANT

## 2017-07-26 PROCEDURE — 94761 N-INVAS EAR/PLS OXIMETRY MLT: CPT

## 2017-07-26 PROCEDURE — 25000003 PHARM REV CODE 250: Performed by: INTERNAL MEDICINE

## 2017-07-26 PROCEDURE — 83735 ASSAY OF MAGNESIUM: CPT

## 2017-07-26 PROCEDURE — 25000003 PHARM REV CODE 250: Performed by: NURSE PRACTITIONER

## 2017-07-26 PROCEDURE — 84100 ASSAY OF PHOSPHORUS: CPT

## 2017-07-26 PROCEDURE — 11000001 HC ACUTE MED/SURG PRIVATE ROOM

## 2017-07-26 PROCEDURE — 80048 BASIC METABOLIC PNL TOTAL CA: CPT

## 2017-07-26 PROCEDURE — 25000003 PHARM REV CODE 250: Performed by: HOSPITALIST

## 2017-07-26 PROCEDURE — 87205 SMEAR GRAM STAIN: CPT

## 2017-07-26 PROCEDURE — 36415 COLL VENOUS BLD VENIPUNCTURE: CPT

## 2017-07-26 PROCEDURE — 99900035 HC TECH TIME PER 15 MIN (STAT)

## 2017-07-26 PROCEDURE — 27000221 HC OXYGEN, UP TO 24 HOURS

## 2017-07-26 PROCEDURE — 87186 SC STD MICRODIL/AGAR DIL: CPT

## 2017-07-26 RX ORDER — SODIUM CHLORIDE 9 MG/ML
INJECTION, SOLUTION INTRAVENOUS CONTINUOUS
Status: DISCONTINUED | OUTPATIENT
Start: 2017-07-26 | End: 2017-07-27

## 2017-07-26 RX ORDER — AZITHROMYCIN 250 MG/1
250 TABLET, FILM COATED ORAL DAILY
Status: DISCONTINUED | OUTPATIENT
Start: 2017-07-27 | End: 2017-07-28

## 2017-07-26 RX ORDER — CODEINE PHOSPHATE AND GUAIFENESIN 10; 100 MG/5ML; MG/5ML
5 SOLUTION ORAL EVERY 4 HOURS PRN
Status: DISCONTINUED | OUTPATIENT
Start: 2017-07-26 | End: 2017-08-01 | Stop reason: HOSPADM

## 2017-07-26 RX ORDER — AZITHROMYCIN 250 MG/1
500 TABLET, FILM COATED ORAL ONCE
Status: COMPLETED | OUTPATIENT
Start: 2017-07-26 | End: 2017-07-26

## 2017-07-26 RX ORDER — IPRATROPIUM BROMIDE AND ALBUTEROL SULFATE 2.5; .5 MG/3ML; MG/3ML
3 SOLUTION RESPIRATORY (INHALATION)
Status: DISCONTINUED | OUTPATIENT
Start: 2017-07-26 | End: 2017-08-01 | Stop reason: HOSPADM

## 2017-07-26 RX ADMIN — GABAPENTIN 300 MG: 300 CAPSULE ORAL at 08:07

## 2017-07-26 RX ADMIN — ACETAMINOPHEN 650 MG: 325 TABLET ORAL at 09:07

## 2017-07-26 RX ADMIN — SODIUM CHLORIDE: 0.9 INJECTION, SOLUTION INTRAVENOUS at 03:07

## 2017-07-26 RX ADMIN — IPRATROPIUM BROMIDE AND ALBUTEROL SULFATE 3 ML: 2.5; .5 SOLUTION RESPIRATORY (INHALATION) at 07:07

## 2017-07-26 RX ADMIN — CEFEPIME HYDROCHLORIDE 2 G: 2 INJECTION, SOLUTION INTRAVENOUS at 05:07

## 2017-07-26 RX ADMIN — GABAPENTIN 300 MG: 300 CAPSULE ORAL at 10:07

## 2017-07-26 RX ADMIN — GUAIFENESIN AND CODEINE PHOSPHATE 5 ML: 100; 10 SOLUTION ORAL at 02:07

## 2017-07-26 RX ADMIN — ACETAMINOPHEN 650 MG: 325 TABLET ORAL at 02:07

## 2017-07-26 RX ADMIN — FLUTICASONE FUROATE AND VILANTEROL TRIFENATATE 1 PUFF: 200; 25 POWDER RESPIRATORY (INHALATION) at 09:07

## 2017-07-26 RX ADMIN — IPRATROPIUM BROMIDE AND ALBUTEROL SULFATE 3 ML: 2.5; .5 SOLUTION RESPIRATORY (INHALATION) at 01:07

## 2017-07-26 RX ADMIN — IPRATROPIUM BROMIDE AND ALBUTEROL SULFATE 3 ML: .5; 3 SOLUTION RESPIRATORY (INHALATION) at 08:07

## 2017-07-26 RX ADMIN — ACETAMINOPHEN 650 MG: 325 TABLET ORAL at 05:07

## 2017-07-26 RX ADMIN — VANCOMYCIN HYDROCHLORIDE 500 MG: 500 INJECTION, POWDER, LYOPHILIZED, FOR SOLUTION INTRAVENOUS at 09:07

## 2017-07-26 RX ADMIN — SODIUM CHLORIDE: 0.9 INJECTION, SOLUTION INTRAVENOUS at 02:07

## 2017-07-26 RX ADMIN — AZITHROMYCIN 500 MG: 250 TABLET, FILM COATED ORAL at 05:07

## 2017-07-26 RX ADMIN — VANCOMYCIN HYDROCHLORIDE 750 MG: 750 INJECTION, POWDER, LYOPHILIZED, FOR SOLUTION INTRAVENOUS at 07:07

## 2017-07-26 RX ADMIN — IPRATROPIUM BROMIDE AND ALBUTEROL SULFATE 3 ML: 2.5; .5 SOLUTION RESPIRATORY (INHALATION) at 04:07

## 2017-07-26 NOTE — PROGRESS NOTES
Sent updated clinical notes via [facility] to [facility].    Juliane Kent, RN, CCM, CMSRN  RN Transition Navigator  [phone]

## 2017-07-26 NOTE — PROGRESS NOTES
Vancomycin Pharmacokinetics Monitoring Protocol  57 y/o male  Ht 59 in, Wt 45.4kg, Scr 0.8 mg/dl, eCrCl ~ 64.6 ml/min, WBC 17.69  Indication: Staphylococcus aureus bacteremia  Target trough ~ 15 - 20 mcg/ml  Current antibiotics: cefepime 2gram IV q12h and vancomycin 750mg IV q24h  Based on his pharmacokinetics/protocol, will adjust vancomycin to 500mg IV q12h with trough drawn prior to the 3rd dose (7/27 @ 0800). Pharmacy will continue to follow.

## 2017-07-26 NOTE — SUBJECTIVE & OBJECTIVE
Interval History: Pt states feeling much better. C/O headache from coughing.  Denies CP, SOB.      Review of Systems   Constitutional: Negative for chills and fever.   Respiratory: Positive for cough. Negative for shortness of breath.    Cardiovascular: Negative for chest pain.   Neurological: Positive for headaches.     Objective:     Vital Signs (Most Recent):  Temp: 98.5 °F (36.9 °C) (07/26/17 0800)  Pulse: 100 (07/26/17 0900)  Resp: 17 (07/26/17 0900)  BP: 108/73 (07/26/17 0800)  SpO2: (!) 92 % (07/26/17 0900) Vital Signs (24h Range):  Temp:  [98.5 °F (36.9 °C)-100.6 °F (38.1 °C)] 98.5 °F (36.9 °C)  Pulse:  [] 100  Resp:  [14-21] 17  SpO2:  [92 %-100 %] 92 %  BP: ()/(54-76) 108/73     Weight: 45.4 kg (100 lb)  Body mass index is 14.77 kg/m².    Intake/Output Summary (Last 24 hours) at 07/26/17 1112  Last data filed at 07/26/17 0900   Gross per 24 hour   Intake              555 ml   Output              400 ml   Net              155 ml      Physical Exam   Constitutional: He is oriented to person, place, and time. No distress.   Cardiovascular: Normal rate and regular rhythm.    Pulmonary/Chest: Effort normal. No respiratory distress. He has no wheezes.   + rhonchi in right posterior lung base    Musculoskeletal: He exhibits no edema.   Neurological: He is alert and oriented to person, place, and time.   Psychiatric: He has a normal mood and affect.   Nursing note and vitals reviewed.      Significant Labs:   BMP:   Recent Labs  Lab 07/26/17  0443   GLU 79   *   K 4.4      CO2 21*   BUN 17   CREATININE 0.8   CALCIUM 8.3*   MG 1.8     CBC:   Recent Labs  Lab 07/25/17  0255 07/26/17  0443   WBC 22.53* 17.69*   HGB 14.4 12.6*   HCT 41.9 37.6*    179     Microbiology Results (last 7 days)     Procedure Component Value Units Date/Time    Blood culture [[id number]] Collected:  07/26/17 1040    Order Status:  Sent Specimen:  Blood Updated:  07/26/17 1054    Blood culture [[id number]]  Collected:  07/26/17 1040    Order Status:  Sent Specimen:  Blood Updated:  07/26/17 1054    Blood culture x two cultures. Draw prior to antibiotics. [[id number]] Collected:  07/25/17 0250    Order Status:  Completed Specimen:  Blood from Peripheral, Wrist, Left Updated:  07/26/17 1045     Blood Culture, Routine Gram stain jennifer bottle: Gram positive cocci in clusters resembling Staph      Blood Culture, Routine Results called to and read back by:Mary Licona RN 07/26/2017  10:44    Narrative:       Aerobic and anaerobic    Blood culture x two cultures. Draw prior to antibiotics. [[id number]] Collected:  07/25/17 0249    Order Status:  Completed Specimen:  Blood from Peripheral, Forearm, Right Updated:  07/26/17 0846     Blood Culture, Routine Gram stain aer bottle: Gram positive cocci in clusters resembling Staph     Blood Culture, Routine Results called to and read back by:Philomena Cloud RN 07/25/2017  21:01     Blood Culture, Routine --     STAPHYLOCOCCUS AUREUS  Susceptibility pending  ID consult required at [facility].      Narrative:       Aerobic and anaerobic    Culture, Respiratory with Gram Stain [[id number]]     Order Status:  No result Specimen:  Respiratory from Sputum           Significant Imaging: no new

## 2017-07-26 NOTE — ASSESSMENT & PLAN NOTE
Pulmonary hypotension  Compensated at present. . No rales or edema on exam.  Monitor I&O and weight.

## 2017-07-26 NOTE — PHYSICIAN QUERY
PT Name: Christiano Baer  MR #: [id number]    Physician Query Form - Cause and Effect Relationship Clarification      CDS/: Karoline Hightower               Contact information: [email or web address]    This form is a permanent document in the medical record.     Query Date: July 26, 2017    By submitting this query, we are merely seeking further clarification of documentation. Please utilize your independent clinical judgment when addressing the question(s) below.    The Medical record contains the following:  Supporting Clinical Findings   Location in record   Sepsis with hypotension   Pneumonia of right lung                                                     Staphylococcus aureus bacteremia        Pneumonia of right lung, leukocytosis   Both bottles positive (aerobic and anaerobic) for gram positive cocci in clusters/Staph aureus                                                        Staphylococcus aureus   Susceptibility pending                                             H&P 07/25      PN 07/26            Blood Culture 07/25                                                                                                                                                                                                       Provider, please clarify if there is any correlation between ____Sepsis__ and __Staphylococcus aureus__.           Are the conditions:     [ x ] Due to or associated with each other     [  ] Unrelated to each other     [  ] Other (Please Specify): _________________________     [  ] Clinically Undetermined

## 2017-07-26 NOTE — PLAN OF CARE
Problem: Patient Care Overview  Goal: Plan of Care Review  Outcome: Ongoing (interventions implemented as appropriate)  Pt understands his plan of care.  Pt is in good spirits until midnight.  He asks that no one bother him after that time and gets frustrated when we go into the room.  Pt refused hi resp. Treatment at 3:20 am.

## 2017-07-26 NOTE — PLAN OF CARE
07/26/17 1020   Readmission Questionnaire   At the time of your discharge, did someone talk to you about what your health problems were? Yes   At the time of discharge, did someone talk to you about what to watch out for regarding worsening of your health problem? Yes   At the time of discharge, did someone talk to you about what to do if you experienced worsening of your health problem? Yes   At the time of discharge, did someone talk to you about which medication to take when you left the hospital and which ones to stop taking? Yes   At the time of discharge, did someone talk to you about when and where to follow up with a doctor after you left the hospital? Yes   What do you believe caused you to be sick enough to be re-admitted? throwed up   Do you have problems taking your medications as prescribed? Yes   Do you have problems obtaining/receiving your medications? No   Does the patient have transportation to healthcare appointments? Yes   Does your caregiver provide all the help you need? Yes   Are you currently feeling confused? No   Are you currently having problems thinking? No   Are you currently having memory problems? No   In the last 7 days, my sleep quality was: very good

## 2017-07-26 NOTE — PLAN OF CARE
Problem: Patient Care Overview  Goal: Plan of Care Review  Outcome: Ongoing (interventions implemented as appropriate)  Pt's SpO2 92% on 2 lpm NC. No adverse reactions to aerosol tx or MDI. No respiratory distress noted. Continue to monitor SpO2.

## 2017-07-26 NOTE — ASSESSMENT & PLAN NOTE
-Patient with recent hospitalization from 6/23/2017 - 6/27/2017 for sepsis 2/2 to CAP. Treated with 7-day course of moxifloxacin.   -Blood Culture during last admission were NGTD; Respiratory Culture grew streptococcus group F.   -Presented with cough, SOB, pleuritic chest pain.   -Imaging revealing marked architectural distortion/scarring in the RIGHT hemithorax similar to prior except for increased pleural thickening laterally. LEFT lung is hyperinflated with emphysematous change but clear acute disease.   -Febrile upon admission, WBC this AM 17.69, Pro-deandre elevated at 18.   -Blood Cultures from 7/25 revealing gram positive cocci in clusters resembling staph.   -Repeat Blood culture from 7/26 pending.   -Currently on cefepime and vancomycin.   -Unclear etiology of bacteremia at this time, patient denies IVDU and has no chronic lines or skin breakdown. Etiology likely could be Staph Pneumonia vs Recurrence of TB. Recommend continuing current antibiotic therapy at this time and rule out TB. Will order AFB's and put patient on isolation. Will continue to follow.

## 2017-07-26 NOTE — HOSPITAL COURSE
WBC count was 15,870 when he was discharged on 6/26/17, 22,530 on admission, and decreased to normal by 7/27/17 on cefepime and vancomycin.  Blood cultures on admission grew methicillin-resistant Staphylococcus aureus.  Sputum culture also grew it.  Infectious Disease was consulted.  They added azithromycin for his pneumonia and investigated possible recurrence of tuberculosis.  Cefepime and azithromycin were stopped once sputum culture results were reported on 7/28/17.  Infectious Disease recommended JANET after a TTE showed no vegetation. Infectious Disease also recommended chest CT.  Contrast was added for better diagnostic value in case he had malignancy due to smoking history.  Contrast chest CT on 7/29/17 incidentally showed right pulmonary embolism, so anticoagulation was started.  JANET on 7/31 showed no obvious evidence of thrombus or mass. ID recommends 4 weeks of Vancomycin from negative blood culture (through 8/22/2017).  Recommends repeat blood cultures after this to ensure clearing since there is a clot in the right pulmonary artery.  AFB x 2 stain negative and ID removed airborne isolation status.  PICC placed 8/1/2017.

## 2017-07-26 NOTE — ASSESSMENT & PLAN NOTE
Pneumonia of right lung, leukocytosis   Both bottles positive (aerobic and anaerobic) for gram positive cocci in clusters/Staph aureus. BP improving. WBC 17K today (slightly decreased).  Tmax 100.6.   Normal lactic acid. Procalcitonin elevated at 18 on admission. CXR: increased lateral right pleural thickening.  Given cefepime, cipro and vanco in ED. Continue cefepime and vanco, day #2 for healthcare associated pneumonia with bacteremia. Supportive care with nebs, antipyretics, analgesia and supplemental oxygen.  Monitor.

## 2017-07-26 NOTE — CONSULTS
[facility]  Infectious Disease  Consult Note    Patient Name: Christiano Baer  MRN: [id number]  Admission Date: 7/25/2017  Hospital Length of Stay: 1 days  Attending Physician: Kenan Cox MD  Primary Care Provider: Allan Majano MD     Isolation Status: No active isolations    Patient information was obtained from patient, past medical records and ER records.      Consults  Assessment/Plan:     * Staphylococcus aureus bacteremia    -Patient with recent hospitalization from 6/23/2017 - 6/27/2017 for sepsis 2/2 to CAP. Treated with 7-day course of moxifloxacin.   -Blood Culture during last admission were NGTD; Respiratory Culture grew streptococcus group F.   -Presented with cough, SOB, pleuritic chest pain.   -Imaging revealing marked architectural distortion/scarring in the RIGHT hemithorax similar to prior except for increased pleural thickening laterally. LEFT lung is hyperinflated with emphysematous change but clear acute disease.   -Febrile upon admission, WBC this AM 17.69, Pro-deandre elevated at 18.   -Blood Cultures from 7/25 revealing gram positive cocci in clusters resembling staph.   -Repeat Blood culture from 7/26 pending.   -Currently on cefepime and vancomycin.   -Unclear etiology of bacteremia at this time, patient denies IVDU and has no chronic lines or skin breakdown. Etiology likely could be Staph Pneumonia vs Recurrence of TB. Recommend continuing current antibiotic therapy at this time and rule out TB. Will order AFB's and put patient on isolation. Will add azithromycin for atypical coverage. Also recommend consult to pulmonary. Will continue to follow.             Thank you for your consult. I will follow-up with patient. Please contact us if you have any additional questions.    Willy Ruiz,   Infectious Disease  [facility]    Subjective:     Principal Problem: Staphylococcus aureus bacteremia    HPI: Patient is a 55 y/o AA M with a PMH  significant for COPD on 2L NC, hx of TB (treated in 2003), and hx of alcohol abuse with pancreatitis in 2014. Patient was a poor historian upon examination and history was obtained per chart review and patient. Patient was recently hospitalized at [facility] from 6/23/2017 - 6/26/2017 for sepsis with hypotension secondary to CAP. During that admission, he was treated with Vancomcyin/Cefepime/Flagyl/and Ciprofloxacin initially. Blood cultures during that admission revealed no growth however respiratory cultures revealed Streptococcus Group F. Mr. Delacruz subsequently improved clinically and his antibiotics were de-escalated to moxifloxacin. He was subsequently discharged on a 7 day course of moxifloxacin. On 07/25/2017 the patient presented back to [facility] with complaints of cough, SOB, and pleuritic chest pain. Patient was found to be tachycardic and hypotensive and he was treated for sepsis. CXR revealed marked architectural distortion/scarring in the RIGHT hemithorax and hyperinflated L lung with emphysematous change but clear of acute disease. WBC was 22.52, Pro-Sadi is elevated at 18, and the patient has been febrile with a Tmax of 100.6 in the past 24 hours. Patient was started on vancomycin, ciprofloxacin, and cefepime. Subsequent blood cultures from admission have grown staphylococcus aureus. Upon examination patient endorses productive cough, pleuritic chest pain, and SOB.      Microbiology Data  6/23/2017     Blood Cultures x 2       NGTD  6/23/2017     Urine Culture               No growth  6/23/2017     Respiratory Culture     Streptococcus Group F. Gram Stain with many gram positive cocci, many gram negative rods, moderate gram negative diplococci.  7/25/2017     Blood Culture x 2         Staphylococcus Aureus; Susceptibilities Pending  7/26/2017     Blood Culture x 2         In Process    Antibiotics  Cefepime            2g IVPB q12h                7/25 - present  Ciprofloxacin       400mg IVPB  x 1            7/25  Vancomycin        1g (in ED)/750mg qD    7/25 - present      Past Medical History:   Diagnosis Date    Alcohol abuse 6/23/2017    Alcoholic pancreatitis 02/23/2014    COPD (chronic obstructive pulmonary disease)     ETOH abuse     Hypotension due to drugs 6/25/2017    Tuberculosis 2006       Past Surgical History:   Procedure Laterality Date    CHEST TUBE INSERTION  November 2013       Review of patient's allergies indicates:  No Known Allergies    Medications:  Prescriptions Prior to Admission   Medication Sig    albuterol-ipratropium 2.5mg-0.5mg/3mL (DUO-NEB) 0.5 mg-3 mg(2.5 mg base)/3 mL nebulizer solution Take 3 mLs by nebulization every 6 (six) hours as needed for Wheezing or Shortness of Breath. Rescue    gabapentin (NEURONTIN) 300 MG capsule Take 300 mg by mouth 3 (three) times daily.    lactobacillus acidophilus & bulgar (LACTINEX) 100 million cell packet Take 1 packet (1 each total) by mouth once daily.    mometasone-formoterol (DULERA) 200-5 mcg/actuation inhaler Inhale 2 puffs into the lungs 2 (two) times daily. Controller    albuterol 90 mcg/actuation inhaler Inhale 1-2 puffs into the lungs every 6 (six) hours as needed for Wheezing.     Antibiotics     Start     Stop Route Frequency Ordered    07/25/17 1630  ceFEPIme in dextrose 5% 2 gram/50 mL IVPB 2 g      -- IV Every 12 hours (non-standard times) 07/25/17 1235    07/25/17 0730  vancomycin 750 mg in dextrose 5 % 250 mL IVPB (ready to mix system)      -- IV Every 24 hours (non-standard times) 07/25/17 1331        Antifungals     None        Antivirals     None           Immunization History   Administered Date(s) Administered    PPD Test 06/26/2017    Tdap 04/16/2017       Family History     None        Social History     Social History    Marital status: Single     Spouse name: N/A    Number of children: N/A    Years of education: N/A     Social History Main Topics    Smoking status: Former Smoker     Packs/day:  1.00     Types: Cigarettes    Smokeless tobacco: Never Used    Alcohol use No      Comment: former alcoholic     Drug use: No    Sexual activity: Not Asked     Other Topics Concern    None     Social History Narrative    None     Review of Systems  Objective:     Vital Signs (Most Recent):  Temp: 98.2 °F (36.8 °C) (07/26/17 1200)  Pulse: 103 (07/26/17 1331)  Resp: 20 (07/26/17 1331)  BP: 105/66 (07/26/17 1200)  SpO2: 98 % (07/26/17 1331) Vital Signs (24h Range):  Temp:  [98.2 °F (36.8 °C)-100.6 °F (38.1 °C)] 98.2 °F (36.8 °C)  Pulse:  [] 103  Resp:  [14-21] 20  SpO2:  [92 %-98 %] 98 %  BP: ()/(66-76) 105/66     Weight: 45.4 kg (100 lb)  Body mass index is 14.77 kg/m².    Estimated Creatinine Clearance: 64.6 mL/min (based on Cr of 0.8).    Physical Exam  General: AAOx3. NAD. Conversant.   HEENT: PERRL, EOMI, MMM, Poor dentition  Respiratory: Decreased breath sounds on R. No wheezing appreciated.   Cardiovascular: S1/S2. RRR. No murmurs appreciated.  Abdomen: Soft and non-tender. Normoactive bowel sounds.  Extremities: No LE edema noted. Moves all 4 extremities sponatenously.   Psych: Appropriate mood and affect.     Significant Labs:   Blood Culture:   Recent Labs  Lab 06/23/17  1253 06/23/17  1357 06/23/17  2155 07/25/17  0249 07/25/17  0250   LABBLOO No growth after 5 days. No growth after 5 days. No growth after 5 days.  No growth after 5 days. Gram stain aer bottle: Gram positive cocci in clusters resembling Staph  Results called to and read back by:Philomena Cloud RN 07/25/2017  21:01  STAPHYLOCOCCUS AUREUSSusceptibility pendingID consult required at [facility] and [facility]. Gram stain jennifer bottle: Gram positive cocci in clusters resembling Staph   Results called to and read back by:Mary Licona RN 07/26/2017  10:44     CBC:   Recent Labs  Lab 07/25/17  0255 07/26/17  0443   WBC 22.53* 17.69*   HGB 14.4 12.6*   HCT 41.9 37.6*    179     CMP:   Recent Labs  Lab  07/25/17  0255 07/26/17  0443   * 135*   K 4.7 4.4   CL 98 107   CO2 19* 21*   GLU 91 79   BUN 27* 17   CREATININE 1.3 0.8   CALCIUM 8.4* 8.3*   PROT 7.7  --    ALBUMIN 2.6*  --    BILITOT 2.5*  --    ALKPHOS 98  --    AST 22  --    ALT 32  --    ANIONGAP 13 7*   EGFRNONAA >60 >60     Procalcitonin:   Recent Labs  Lab 07/25/17  0255   PROCAL 18.40*     Urine Culture:   Recent Labs  Lab 06/23/17  1453   LABURIN No significant growth     Wound Culture: No results for input(s): LABAERO in the last 4320 hours.  All pertinent labs within the past 24 hours have been reviewed.    Significant Imaging: I have reviewed all pertinent imaging results/findings within the past 24 hours.     CXR 7/25/2017  Marked architectural distortion/scarring in the RIGHT hemithorax similar to prior except for increased pleural thickening laterally. LEFT lung is hyperinflated with emphysematous change but clear acute disease. Heart size is stable and mediastinum remain shifted slightly to the RIGHT, unchanged.   Impression         Marked architectural distortion/scarring in the RIGHT hemithorax similar to prior except for increased pleural thickening laterally. Remainder of the exam appears unchanged.

## 2017-07-26 NOTE — HPI
"Patient is a 55 y/o AA M with a PMH significant for COPD on 2L NC, hx of TB (treated in 2003), and hx of alcohol abuse with pancreatitis in 2014. Patient was a poor historian upon examination and history was obtained per chart review. Patient was recently hospitalized at [facility] from 6/23/2017 - 6/26/2017 for sepsis with hypotension secondary to CAP. During that admission, he was treated with Vancomcyin/Cefepime/Flagyl/and Ciprofloxacin initially. Blood cultures during that admission revealed no growth however respiratory cultures revealed Streptococcus Group F. Mr. Delacruz subsequently improved clinically and his antibiotics were de-escalated to moxifloxacin. He was subsequently discharged on a 7 day course of moxifloxacin. On 07/25/2017 the patient presented back to [facility] with complaints of cough, SOB, and pleuritic chest pain. Patient was found to be tachycardic and hypotensive and he was treated for sepsis. CXR revealed marked architectural distortion/scarring in the RIGHT hemithorax and hyperinflated L lung with emphysematous change but clear of acute disease. WBC was 22.52, Pro-Sadi is elevated at 18, and the patient has been febrile with a Tmax of 100.6 in the past 24 hours. Patient was started on vancomycin, ciprofloxacin, and cefepime. Subsequent blood cultures from admission have grown staphylococcus aureus. Upon examination patient endorses productive cough, pleuritic chest pain, and SOB.      Microbiology Data  6/23/2017     Blood Cultures x 2       NGTD  6/23/2017     Urine Culture               No growth  6/23/2017     Respiratory Culture     Streptococcus Group F. Gram Stain with many gram positive cocci, many gram negative rods, moderate gram negative diplococci.  7/25/2017     Blood Culture x 2         MRSA  Susceptibility    Methicillin resistant staphylococcus aureus    CULTURE, BLOOD    Clindamycin <=0.5 "><=0.5  Sensitive    Erythromycin <=0.5 "><=0.5  Sensitive    Oxacillin >2  " "Resistant    Penicillin >8  Resistant    Tetracycline <=4 "><=4  Sensitive    Trimeth/Sulfa <=0.5/9.5 "><=0.5/9.5  Sensitive    Vancomycin 1  Sensitive     7/26/2017     Blood Culture x 2                 NGTD  7/26/2017     Respiratory Culture             Staph Aureus (susceptibilities pending). Few WBC's; Few Gram Positive Cocci     7/27/2017     AFB Culture/Smear              No AFB seen on stain. Culture In Process  7/29/2017     AFB                                         In progress     Antibiotics  Cefepime            2g IVPB q12h                7/25 - 7/27  Ciprofloxacin       400mg IVPB x 1            7/25  Vancomycin        1g (in ED)/750mg qD    7/25 - present  Azithromycin        500mg/250mg qD        7/26 - 7/28    "

## 2017-07-26 NOTE — ASSESSMENT & PLAN NOTE
Resolved.  Appears baseline creatinine is < 1.0. 0.8 today.  D/C IVF. Monitor. Avoid nephrotoxins and renally dose meds.

## 2017-07-26 NOTE — SUBJECTIVE & OBJECTIVE
Past Medical History:   Diagnosis Date    Alcohol abuse 6/23/2017    Alcoholic pancreatitis 02/23/2014    COPD (chronic obstructive pulmonary disease)     ETOH abuse     Hypotension due to drugs 6/25/2017    Tuberculosis 2006       Past Surgical History:   Procedure Laterality Date    CHEST TUBE INSERTION  November 2013       Review of patient's allergies indicates:  No Known Allergies    Medications:  Prescriptions Prior to Admission   Medication Sig    albuterol-ipratropium 2.5mg-0.5mg/3mL (DUO-NEB) 0.5 mg-3 mg(2.5 mg base)/3 mL nebulizer solution Take 3 mLs by nebulization every 6 (six) hours as needed for Wheezing or Shortness of Breath. Rescue    gabapentin (NEURONTIN) 300 MG capsule Take 300 mg by mouth 3 (three) times daily.    lactobacillus acidophilus & bulgar (LACTINEX) 100 million cell packet Take 1 packet (1 each total) by mouth once daily.    mometasone-formoterol (DULERA) 200-5 mcg/actuation inhaler Inhale 2 puffs into the lungs 2 (two) times daily. Controller    albuterol 90 mcg/actuation inhaler Inhale 1-2 puffs into the lungs every 6 (six) hours as needed for Wheezing.     Antibiotics     Start     Stop Route Frequency Ordered    07/25/17 1630  ceFEPIme in dextrose 5% 2 gram/50 mL IVPB 2 g      -- IV Every 12 hours (non-standard times) 07/25/17 1235    07/25/17 0730  vancomycin 750 mg in dextrose 5 % 250 mL IVPB (ready to mix system)      -- IV Every 24 hours (non-standard times) 07/25/17 1331        Antifungals     None        Antivirals     None           Immunization History   Administered Date(s) Administered    PPD Test 06/26/2017    Tdap 04/16/2017       Family History     None        Social History     Social History    Marital status: Single     Spouse name: N/A    Number of children: N/A    Years of education: N/A     Social History Main Topics    Smoking status: Former Smoker     Packs/day: 1.00     Types: Cigarettes    Smokeless tobacco: Never Used    Alcohol use No       Comment: former alcoholic     Drug use: No    Sexual activity: Not Asked     Other Topics Concern    None     Social History Narrative    None     Review of Systems  Objective:     Vital Signs (Most Recent):  Temp: 98.2 °F (36.8 °C) (07/26/17 1200)  Pulse: 103 (07/26/17 1331)  Resp: 20 (07/26/17 1331)  BP: 105/66 (07/26/17 1200)  SpO2: 98 % (07/26/17 1331) Vital Signs (24h Range):  Temp:  [98.2 °F (36.8 °C)-100.6 °F (38.1 °C)] 98.2 °F (36.8 °C)  Pulse:  [] 103  Resp:  [14-21] 20  SpO2:  [92 %-98 %] 98 %  BP: ()/(66-76) 105/66     Weight: 45.4 kg (100 lb)  Body mass index is 14.77 kg/m².    Estimated Creatinine Clearance: 64.6 mL/min (based on Cr of 0.8).    Physical Exam  General: AAOx3. NAD. Conversant.   HEENT: PERRL, EOMI, MMM, Poor dentition  Respiratory: Decreased breath sounds on R. No wheezing appreciated.   Cardiovascular: S1/S2. RRR. No murmurs appreciated.  Abdomen: Soft and non-tender. Normoactive bowel sounds.  Extremities: No LE edema noted. Moves all 4 extremities sponatenously.   Psych: Appropriate mood and affect.     Significant Labs:   Blood Culture:   Recent Labs  Lab 06/23/17  1253 06/23/17  1357 06/23/17  2155 07/25/17  0249 07/25/17  0250   LABBLOO No growth after 5 days. No growth after 5 days. No growth after 5 days.  No growth after 5 days. Gram stain aer bottle: Gram positive cocci in clusters resembling Staph  Results called to and read back by:Philomena Cloud RN 07/25/2017  21:01  STAPHYLOCOCCUS AUREUSSusceptibility pendingID consult required at [facility]. Gram stain jennifer bottle: Gram positive cocci in clusters resembling Staph   Results called to and read back by:Mary Licona RN 07/26/2017  10:44     CBC:   Recent Labs  Lab 07/25/17  0255 07/26/17 0443   WBC 22.53* 17.69*   HGB 14.4 12.6*   HCT 41.9 37.6*    179     CMP:   Recent Labs  Lab 07/25/17  0255 07/26/17 0443   * 135*   K 4.7 4.4   CL 98 107   CO2 19* 21*   GLU 91 79    BUN 27* 17   CREATININE 1.3 0.8   CALCIUM 8.4* 8.3*   PROT 7.7  --    ALBUMIN 2.6*  --    BILITOT 2.5*  --    ALKPHOS 98  --    AST 22  --    ALT 32  --    ANIONGAP 13 7*   EGFRNONAA >60 >60     Procalcitonin:   Recent Labs  Lab 07/25/17  0255   PROCAL 18.40*     Urine Culture:   Recent Labs  Lab 06/23/17  1453   LABURIN No significant growth     Wound Culture: No results for input(s): LABAERO in the last 4320 hours.  All pertinent labs within the past 24 hours have been reviewed.    Significant Imaging: I have reviewed all pertinent imaging results/findings within the past 24 hours.     CXR 7/25/2017  Marked architectural distortion/scarring in the RIGHT hemithorax similar to prior except for increased pleural thickening laterally. LEFT lung is hyperinflated with emphysematous change but clear acute disease. Heart size is stable and mediastinum remain shifted slightly to the RIGHT, unchanged.   Impression         Marked architectural distortion/scarring in the RIGHT hemithorax similar to prior except for increased pleural thickening laterally. Remainder of the exam appears unchanged.

## 2017-07-26 NOTE — PLAN OF CARE
Problem: Patient Care Overview  Goal: Plan of Care Review  Outcome: Ongoing (interventions implemented as appropriate)  Plan of care reviewed with patient. Voices understanding. Complaints of DUMONT today. SPO2 94% 3 L nc at this time. Awaiting sputum for respiratory culture. Patient will be monitored overnight.

## 2017-07-26 NOTE — PROGRESS NOTES
[facility] Hospital Medicine  Progress Note    Patient Name: Christiano Baer  MRN: [id number]  Patient Class: IP- Inpatient   Admission Date: 7/25/2017  Length of Stay: 1 days  Attending Physician: Kenan Cox MD  Primary Care Provider: Allan Majano MD        Subjective:     Principal Problem:Staphylococcus aureus bacteremia    HPI:  Christiano Baer is a 56 y.o. black man with bullous emphysema due to cigarette smoking, chronic hypoxic respiratory failure (on 2 liters nasal cannula), history of tuberculosis, history of alcohol abuse with alcoholic pancreatitis on 2/23/14.  He  currently lives at [facility].         He was hospitalized at [facility] from 6/23/17 to 6/26/17 for right middle and lower lung pneumonia treated with moxifloxacin.  He was hypotensive on amlodipine, hydrochlorothiazide, and valsartan, which were discontinued at that time.  He was discharged to [facility] skilled nursing facility.       He returned to [facility] on 7/25/17 with nausea, shortness of breath, cough, pleuritic chest pain worsened by coughing, headache, and generalized weakness.  Initially his oxygen saturation was as low as 89% on 3 liters nasal cannula.  He was given albuterol-ipratropium nebulizer treatment and oxygen saturation improved to high 90s on 2 liters.  He was also tachycardic (pulse up to 140) and hypotensive (blood pressure as low as 83/54), which improved after being given IV fluids.  Temperature was 100.1 F.  WBC count was 22,530.   Procalcitonin elevated at 18. Total bilirubin elevated at 2.5.  BNP elevated at 425.  Chest x-ray showed increased lateral right pleural thickening. Blood cultures were collected and he was given cefepime, vancomycin, and ciprofloxacin.   Admitting to [facility].      Patient is a poor historian and appears anxious during my interview and exam.  He currently reports leg pain  and fatigue.  He denies CP, SOB, N/V.  States he stopped smoking after the last hospitalization.      Hospital Course:  7/25 blood cultures: gram positive cocci in clusters/Staph Aureus in both aerobic and anaerobic bottles. Pt on vancomycin. Consulted ID and ordered repeat blood cultures.      Interval History: Pt states feeling much better. C/O headache from coughing.  Denies CP, SOB.      Review of Systems   Constitutional: Negative for chills and fever.   Respiratory: Positive for cough. Negative for shortness of breath.    Cardiovascular: Negative for chest pain.   Neurological: Positive for headaches.     Objective:     Vital Signs (Most Recent):  Temp: 98.5 °F (36.9 °C) (07/26/17 0800)  Pulse: 100 (07/26/17 0900)  Resp: 17 (07/26/17 0900)  BP: 108/73 (07/26/17 0800)  SpO2: (!) 92 % (07/26/17 0900) Vital Signs (24h Range):  Temp:  [98.5 °F (36.9 °C)-100.6 °F (38.1 °C)] 98.5 °F (36.9 °C)  Pulse:  [] 100  Resp:  [14-21] 17  SpO2:  [92 %-100 %] 92 %  BP: ()/(54-76) 108/73     Weight: 45.4 kg (100 lb)  Body mass index is 14.77 kg/m².    Intake/Output Summary (Last 24 hours) at 07/26/17 1112  Last data filed at 07/26/17 0900   Gross per 24 hour   Intake              555 ml   Output              400 ml   Net              155 ml      Physical Exam   Constitutional: He is oriented to person, place, and time. No distress.   Cardiovascular: Normal rate and regular rhythm.    Pulmonary/Chest: Effort normal. No respiratory distress. He has no wheezes.   + rhonchi in right posterior lung base    Musculoskeletal: He exhibits no edema.   Neurological: He is alert and oriented to person, place, and time.   Psychiatric: He has a normal mood and affect.   Nursing note and vitals reviewed.      Significant Labs:   BMP:   Recent Labs  Lab 07/26/17  0443   GLU 79   *   K 4.4      CO2 21*   BUN 17   CREATININE 0.8   CALCIUM 8.3*   MG 1.8     CBC:   Recent Labs  Lab 07/25/17  0255 07/26/17  0443   WBC 22.53*  17.69*   HGB 14.4 12.6*   HCT 41.9 37.6*    179     Microbiology Results (last 7 days)     Procedure Component Value Units Date/Time    Blood culture [[id number]] Collected:  07/26/17 1040    Order Status:  Sent Specimen:  Blood Updated:  07/26/17 1054    Blood culture [[id number]] Collected:  07/26/17 1040    Order Status:  Sent Specimen:  Blood Updated:  07/26/17 1054    Blood culture x two cultures. Draw prior to antibiotics. [[id number]] Collected:  07/25/17 0250    Order Status:  Completed Specimen:  Blood from Peripheral, Wrist, Left Updated:  07/26/17 1045     Blood Culture, Routine Gram stain jennifer bottle: Gram positive cocci in clusters resembling Staph      Blood Culture, Routine Results called to and read back by:Mary Licona RN 07/26/2017  10:44    Narrative:       Aerobic and anaerobic    Blood culture x two cultures. Draw prior to antibiotics. [[id number]] Collected:  07/25/17 0249    Order Status:  Completed Specimen:  Blood from Peripheral, Forearm, Right Updated:  07/26/17 0846     Blood Culture, Routine Gram stain aer bottle: Gram positive cocci in clusters resembling Staph     Blood Culture, Routine Results called to and read back by:Philomena Cloud RN 07/25/2017  21:01     Blood Culture, Routine --     STAPHYLOCOCCUS AUREUS  Susceptibility pending  ID consult required at [facility].      Narrative:       Aerobic and anaerobic    Culture, Respiratory with Gram Stain [[id number]]     Order Status:  No result Specimen:  Respiratory from Sputum           Significant Imaging: no new    Assessment/Plan:      * Staphylococcus aureus bacteremia    Pneumonia of right lung, leukocytosis   Both bottles positive (aerobic and anaerobic) for gram positive cocci in clusters/Staph aureus. BP improving. WBC 17K today (slightly decreased).  Tmax 100.6.   Normal lactic acid. Procalcitonin elevated at 18 on admission. CXR: increased lateral right pleural thickening.  Given cefepime, cipro  and vanco in ED. Continue cefepime and vanco, day #2 for healthcare associated pneumonia with bacteremia. Supportive care with nebs, antipyretics, analgesia and supplemental oxygen.  Monitor.           Acute on chronic respiratory failure with hypoxia    COPD exacerbation, chronic bullous emphysema, supplemental oxygen dependent   O2 sat > 91% on 2 liters NC. Continue supplemental oxygen. Monitor.  Continue Breo, nebs.            MARLENA (acute kidney injury)    Resolved.  Appears baseline creatinine is < 1.0. 0.8 today.  D/C IVF. Monitor. Avoid nephrotoxins and renally dose meds.           Hyperbilirubinemia    No GI complaints at present though patient c/o nausea with coughing. No elevation in AST or ALT. Monitor.           Diastolic dysfunction    Pulmonary hypotension  Compensated at present. . No rales or edema on exam.  Monitor I&O and weight.           Hypomagnesemia    Resolved.  Likely 2/2 poor PO intake. Given mag rider in ED. Started on IVF. Monitor.           Tobacco abuse    Pt states that he stopped smoking after last hospitalization. Encouraged to continue with cessation.             VTE Risk Mitigation         Ordered     Medium Risk of VTE  Once      07/25/17 0727     Place sequential compression device  Until discontinued      07/25/17 0727     Place MARSHA hose  Until discontinued      07/25/17 0727          Maricel Max PA-C  Department of Hospital Medicine   [facility]  Pager: [phone]    Attending: Kenan Cox MD

## 2017-07-26 NOTE — PLAN OF CARE
07/26/17 1017   Discharge Assessment   Assessment Type Discharge Planning Assessment   Confirmed/corrected address and phone number on facesheet? Yes   Assessment information obtained from? Patient   Prior to hospitilization cognitive status: Alert/Oriented   Prior to hospitalization functional status: Independent   Current Functional Status: Independent;Assistive Equipment   Arrived From home or self-care   Lives With child(jenn), adult   Able to Return to Prior Arrangements yes   Is patient able to care for self after discharge? No   Patient's perception of discharge disposition skilled nursing facility   Patient currently being followed by outpatient case management? No   Patient currently receives home health services? No   Does the patient currently use HME? Yes   Patient currently receives any other outside agency services? Yes   Equipment Currently Used at Home oxygen   Do you have any problems affording any of your prescribed medications? No   Is the patient taking medications as prescribed? yes   Do you have any financial concerns preventing you from receiving the healthcare you need? Yes   Does the patient have transportation to healthcare appointments? No   On Dialysis? No   Does the patient receive services at the [facility] Clinic? No   Are there any open cases? No   Discharge Plan A Skilled Nursing Facility   Discharge Plan B New Nursing Home placement - long term care facility   Patient/Family In Agreement With Plan yes       Patient was at [facility]- patient on 02 @/NC continuous.  Patient would like to go back to [facility].    Juliane Kent RN, CCM, CMSRN  RN Transition Navigator  [phone]

## 2017-07-26 NOTE — ASSESSMENT & PLAN NOTE
COPD exacerbation, chronic bullous emphysema, supplemental oxygen dependent   O2 sat > 91% on 2 liters NC. Continue supplemental oxygen. Monitor.  Continue Breo, nebs.

## 2017-07-27 PROBLEM — D72.829 LEUKOCYTOSIS: Status: RESOLVED | Noted: 2017-07-26 | Resolved: 2017-07-27

## 2017-07-27 PROBLEM — Z86.11 HISTORY OF TB (TUBERCULOSIS): Status: ACTIVE | Noted: 2017-07-27

## 2017-07-27 PROBLEM — N17.9 AKI (ACUTE KIDNEY INJURY): Status: RESOLVED | Noted: 2017-06-23 | Resolved: 2017-07-27

## 2017-07-27 PROBLEM — E80.6 HYPERBILIRUBINEMIA: Status: RESOLVED | Noted: 2017-07-25 | Resolved: 2017-07-27

## 2017-07-27 PROBLEM — B95.62 BACTEREMIA DUE TO METHICILLIN RESISTANT STAPHYLOCOCCUS AUREUS: Status: ACTIVE | Noted: 2017-06-23

## 2017-07-27 PROBLEM — E83.42 HYPOMAGNESEMIA: Status: RESOLVED | Noted: 2017-07-25 | Resolved: 2017-07-27

## 2017-07-27 LAB
ALBUMIN SERPL BCP-MCNC: 2.2 G/DL
ALP SERPL-CCNC: 76 U/L
ALT SERPL W/O P-5'-P-CCNC: 30 U/L
ANION GAP SERPL CALC-SCNC: 8 MMOL/L
AST SERPL-CCNC: 29 U/L
BACTERIA BLD CULT: NORMAL
BASOPHILS # BLD AUTO: 0.02 K/UL
BASOPHILS NFR BLD: 0.2 %
BILIRUB DIRECT SERPL-MCNC: 0.5 MG/DL
BILIRUB SERPL-MCNC: 0.8 MG/DL
BUN SERPL-MCNC: 13 MG/DL
CALCIUM SERPL-MCNC: 8.7 MG/DL
CHLORIDE SERPL-SCNC: 107 MMOL/L
CO2 SERPL-SCNC: 22 MMOL/L
CREAT SERPL-MCNC: 0.8 MG/DL
DIFFERENTIAL METHOD: ABNORMAL
EOSINOPHIL # BLD AUTO: 0.3 K/UL
EOSINOPHIL NFR BLD: 2.8 %
ERYTHROCYTE [DISTWIDTH] IN BLOOD BY AUTOMATED COUNT: 13.8 %
EST. GFR  (AFRICAN AMERICAN): >60 ML/MIN/1.73 M^2
EST. GFR  (NON AFRICAN AMERICAN): >60 ML/MIN/1.73 M^2
GLUCOSE SERPL-MCNC: 69 MG/DL
HCT VFR BLD AUTO: 35.6 %
HGB BLD-MCNC: 12 G/DL
LYMPHOCYTES # BLD AUTO: 1.3 K/UL
LYMPHOCYTES NFR BLD: 13.5 %
MCH RBC QN AUTO: 32 PG
MCHC RBC AUTO-ENTMCNC: 33.7 G/DL
MCV RBC AUTO: 95 FL
MONOCYTES # BLD AUTO: 1.1 K/UL
MONOCYTES NFR BLD: 11.8 %
NEUTROPHILS # BLD AUTO: 6.7 K/UL
NEUTROPHILS NFR BLD: 71.3 %
PLATELET # BLD AUTO: 173 K/UL
PMV BLD AUTO: 9.8 FL
POTASSIUM SERPL-SCNC: 4.1 MMOL/L
PROT SERPL-MCNC: 7 G/DL
RBC # BLD AUTO: 3.75 M/UL
SODIUM SERPL-SCNC: 137 MMOL/L
VANCOMYCIN TROUGH SERPL-MCNC: 6.1 UG/ML
WBC # BLD AUTO: 9.33 K/UL

## 2017-07-27 PROCEDURE — 93306 TTE W/DOPPLER COMPLETE: CPT

## 2017-07-27 PROCEDURE — 63600175 PHARM REV CODE 636 W HCPCS: Performed by: PHYSICIAN ASSISTANT

## 2017-07-27 PROCEDURE — 87116 MYCOBACTERIA CULTURE: CPT

## 2017-07-27 PROCEDURE — 25000003 PHARM REV CODE 250: Performed by: HOSPITALIST

## 2017-07-27 PROCEDURE — 94761 N-INVAS EAR/PLS OXIMETRY MLT: CPT

## 2017-07-27 PROCEDURE — 94640 AIRWAY INHALATION TREATMENT: CPT

## 2017-07-27 PROCEDURE — 87149 DNA/RNA DIRECT PROBE: CPT

## 2017-07-27 PROCEDURE — 97535 SELF CARE MNGMENT TRAINING: CPT

## 2017-07-27 PROCEDURE — 80202 ASSAY OF VANCOMYCIN: CPT

## 2017-07-27 PROCEDURE — 11000001 HC ACUTE MED/SURG PRIVATE ROOM

## 2017-07-27 PROCEDURE — 25000003 PHARM REV CODE 250: Performed by: NURSE PRACTITIONER

## 2017-07-27 PROCEDURE — G8987 SELF CARE CURRENT STATUS: HCPCS | Mod: CJ

## 2017-07-27 PROCEDURE — 87118 MYCOBACTERIC IDENTIFICATION: CPT

## 2017-07-27 PROCEDURE — 27000221 HC OXYGEN, UP TO 24 HOURS

## 2017-07-27 PROCEDURE — 25000003 PHARM REV CODE 250: Performed by: PHYSICIAN ASSISTANT

## 2017-07-27 PROCEDURE — 80048 BASIC METABOLIC PNL TOTAL CA: CPT

## 2017-07-27 PROCEDURE — 36415 COLL VENOUS BLD VENIPUNCTURE: CPT

## 2017-07-27 PROCEDURE — 93306 TTE W/DOPPLER COMPLETE: CPT | Mod: 26,,, | Performed by: INTERNAL MEDICINE

## 2017-07-27 PROCEDURE — G8988 SELF CARE GOAL STATUS: HCPCS | Mod: CI

## 2017-07-27 PROCEDURE — 94760 N-INVAS EAR/PLS OXIMETRY 1: CPT

## 2017-07-27 PROCEDURE — 25000242 PHARM REV CODE 250 ALT 637 W/ HCPCS: Performed by: PHYSICIAN ASSISTANT

## 2017-07-27 PROCEDURE — 87015 SPECIMEN INFECT AGNT CONCNTJ: CPT

## 2017-07-27 PROCEDURE — 85025 COMPLETE CBC W/AUTO DIFF WBC: CPT

## 2017-07-27 PROCEDURE — 97165 OT EVAL LOW COMPLEX 30 MIN: CPT

## 2017-07-27 PROCEDURE — 63600175 PHARM REV CODE 636 W HCPCS: Performed by: HOSPITALIST

## 2017-07-27 PROCEDURE — 97161 PT EVAL LOW COMPLEX 20 MIN: CPT

## 2017-07-27 PROCEDURE — 80076 HEPATIC FUNCTION PANEL: CPT

## 2017-07-27 PROCEDURE — 25000003 PHARM REV CODE 250: Performed by: INTERNAL MEDICINE

## 2017-07-27 RX ORDER — SODIUM,POTASSIUM PHOSPHATES 280-250MG
1 POWDER IN PACKET (EA) ORAL
Status: DISPENSED | OUTPATIENT
Start: 2017-07-27 | End: 2017-07-28

## 2017-07-27 RX ORDER — DIPHENHYDRAMINE HCL 25 MG
25 CAPSULE ORAL EVERY 6 HOURS PRN
Status: DISCONTINUED | OUTPATIENT
Start: 2017-07-27 | End: 2017-08-01 | Stop reason: HOSPADM

## 2017-07-27 RX ADMIN — POTASSIUM & SODIUM PHOSPHATES POWDER PACK 280-160-250 MG 1 PACKET: 280-160-250 PACK at 06:07

## 2017-07-27 RX ADMIN — ACETAMINOPHEN 650 MG: 325 TABLET ORAL at 10:07

## 2017-07-27 RX ADMIN — POTASSIUM & SODIUM PHOSPHATES POWDER PACK 280-160-250 MG 1 PACKET: 280-160-250 PACK at 03:07

## 2017-07-27 RX ADMIN — VANCOMYCIN HYDROCHLORIDE 1000 MG: 1 INJECTION, POWDER, LYOPHILIZED, FOR SOLUTION INTRAVENOUS at 03:07

## 2017-07-27 RX ADMIN — IPRATROPIUM BROMIDE AND ALBUTEROL SULFATE 3 ML: 2.5; .5 SOLUTION RESPIRATORY (INHALATION) at 07:07

## 2017-07-27 RX ADMIN — AZITHROMYCIN 250 MG: 250 TABLET, FILM COATED ORAL at 10:07

## 2017-07-27 RX ADMIN — GABAPENTIN 300 MG: 300 CAPSULE ORAL at 10:07

## 2017-07-27 RX ADMIN — IPRATROPIUM BROMIDE AND ALBUTEROL SULFATE 3 ML: 2.5; .5 SOLUTION RESPIRATORY (INHALATION) at 03:07

## 2017-07-27 RX ADMIN — ACETAMINOPHEN 650 MG: 325 TABLET ORAL at 08:07

## 2017-07-27 RX ADMIN — CEFEPIME HYDROCHLORIDE 2 G: 2 INJECTION, SOLUTION INTRAVENOUS at 05:07

## 2017-07-27 RX ADMIN — GABAPENTIN 300 MG: 300 CAPSULE ORAL at 08:07

## 2017-07-27 RX ADMIN — IPRATROPIUM BROMIDE AND ALBUTEROL SULFATE 3 ML: 2.5; .5 SOLUTION RESPIRATORY (INHALATION) at 11:07

## 2017-07-27 NOTE — PLAN OF CARE
Problem: Patient Care Overview  Goal: Plan of Care Review  Pt received on 2 lpm NC with SpO2 92 %. Treatment given with no adverse reactions. No respiratory distress noted. Will continue to monitor.

## 2017-07-27 NOTE — PT/OT/SLP EVAL
Occupational Therapy  Evaluation/Treatment     Christiano Baer   MRN: [id number]   Admitting Diagnosis: Bacteremia due to methicillin resistant Staphylococcus aureus    OT Date of Treatment: 07/27/17   OT Start Time: 1002  OT Stop Time: 1029  OT Total Time (min): 27 min    Billable Minutes:  Evaluation 10  Self Care/Home Management 17    Diagnosis: Bacteremia due to methicillin resistant Staphylococcus aureus   The primary encounter diagnosis was Sepsis, due to unspecified organism. Diagnoses of Staphylococcus aureus bacteremia, Pneumonia of right lung due to infectious organism, unspecified part of lung, and History of TB (tuberculosis) were also pertinent to this visit.      Past Medical History:   Diagnosis Date    Alcohol abuse 6/23/2017    Alcoholic pancreatitis 02/23/2014    COPD (chronic obstructive pulmonary disease)     ETOH abuse     Hypotension due to drugs 6/25/2017    Tuberculosis 2006      Past Surgical History:   Procedure Laterality Date    CHEST TUBE INSERTION  November 2013         General Precautions: Standard, fall, respiratory  Orthopedic Precautions: N/A  Braces:            Patient History:  Living Environment  Lives With: child(jenn), adult  Living Arrangements: apartment  Living Environment Comment: Pt admitted from SNF. Pt was receiving therapy post recent hospitalization. Prior to SNF admit, pt was living with family in 2nd floor apartment with 10-12 steps R rail with tub/shower combo; reports (I) as PLOF. Per chart, wears O2 at home, during session reports he does not use O2 at home   Equipment Currently Used at Home: oxygen    Prior level of function:   Bed Mobility/Transfers: independent  Grooming: independent  Bathing: independent  Upper Body Dressing: independent  Lower Body Dressing: independent  Toileting: independent        Dominant hand: right    Subjective:  Communicated with nsg prior to session.  Pt demonstrates frustration over body pain and inability to get pain medicine  stronger than tylenol; also demonstrates labored breathing/increased respiration rates   Chief Complaint: pain, fatigue, SOB  Patient/Family stated goals: return to PLOF     Pain/Comfort  Pain Rating 1: 10/10  Location 1:  (reports generalized body pain )  Pain Addressed 1: Distraction, Cessation of Activity, Nurse notified, Reposition, Pre-medicate for activity    Objective:       Cognitive Exam:  Oriented to: Person, Place, Time and Situation  Follows Commands/attention: Follows multistep  commands  Communication: clear/fluent  Memory:  No Deficits noted  Safety awareness/insight to disability: intact; slightly impulsive  Coping skills/emotional control: Appropriate to situation; agitation noted     Visual/perceptual:  Intact    Physical Exam:  Postural examination/scapula alignment: Rounded shoulder  Skin integrity: Visible skin intact  Edema: None noted     Sensation:   Intact    Upper Extremity Range of Motion:  Right Upper Extremity: WFL  Left Upper Extremity: WFL    Upper Extremity Strength:  Right Upper Extremity: WFL  Left Upper Extremity: WFL   Strength: good     Fine motor coordination:   Intact    Gross motor coordination: WFL    Functional Mobility:  Bed Mobility:  Supine to Sit: Stand by Assistance  Sit to Supine: Stand by Assistance    Transfers:  Sit <> Stand Assistance: Stand By Assistance  Sit <> Stand Assistive Device: No Assistive Device  Toilet Transfer Technique: Stand Pivot  Toilet Transfer Assistance: Stand By Assistance  Toilet Transfer Assistive Device: No Assistive Device    Functional Ambulation: SBA/CGA without AD     Activities of Daily Living:   LE Dressing Level of Assistance: Stand by assistance (increased time 2/2 labored breathing, pain )    Grooming Position: Standing  Grooming Level of Assistance: Stand by assistance  Toileting Where Assessed: Toilet  Toileting Level of Assistance: Stand by assistance       Balance:   Static Sit: NORMAL: No deviations seen in posture held  "statically  Dynamic Sit: NORMAL: No deviations seen in posture held dynamically  Static Stand: FAIR+: Takes MINIMAL challenges from all directions  Dynamic stand: FAIR: Needs CONTACT GUARD during gait/FAIR+     Therapeutic Activities and Exercises:   Functional mobility in room; increased time for performance of axs 2/2 labored breathing and SOB- pursed lip breathing performed; O2 after axs reading low 80's, however, pulse ox not appearing to have accurate reading at times- O2 placed with nsg present; Standing at sink for G&H axs     AM-PAC 6 CLICK ADL  How much help from another person does this patient currently need?  1 = Unable, Total/Dependent Assistance  2 = A lot, Maximum/Moderate Assistance  3 = A little, Minimum/Contact Guard/Supervision  4 = None, Modified Cole/Independent    Putting on and taking off regular lower body clothing? : 3  Bathing (including washing, rinsing, drying)?: 3  Toileting, which includes using toilet, bedpan, or urinal? : 3  Putting on and taking off regular upper body clothing?: 4  Taking care of personal grooming such as brushing teeth?: 3  Eating meals?: 4  Total Score: 20    AM-PAC Raw Score CMS "G-Code Modifier Level of Impairment Assistance   6 % Total / Unable   7 - 9 CM 80 - 100% Maximal Assist   10-14 CL 60 - 80% Moderate Assist   15 - 19 CK 40 - 60% Moderate Assist   20 - 22 CJ 20 - 40% Minimal Assist   23 CI 1-20% SBA / CGA   24 CH 0% Independent/ Mod I       Patient left supine with all lines intact, call button in reach, bed alarm on and nsg notified     Assessment:  Christiano Baer is a 56 y.o. male with a medical diagnosis of Bacteremia due to methicillin resistant Staphylococcus aureus and presents with deconditioning/SOB. Pt would benefit from cont OT services in order to maximize functional independence. Recommendations ongoing at this time.     Rehab identified problem list/impairments: Rehab identified problem list/impairments: weakness, impaired " endurance, impaired balance, pain, impaired self care skills, impaired functional mobilty, impaired cardiopulmonary response to activity, decreased safety awareness    Rehab potential is good.    Activity tolerance: Fair + 2/2 SOB    Discharge recommendations: Discharge Facility/Level Of Care Needs:  (TBD)     Barriers to discharge: Barriers to Discharge: Inaccessible home environment, Decreased caregiver support    Equipment recommendations: shower chair     GOALS:    Occupational Therapy Goals        Problem: Occupational Therapy Goal    Goal Priority Disciplines Outcome Interventions   Occupational Therapy Goal     OT, PT/OT Ongoing (interventions implemented as appropriate)                    PLAN:  Patient to be seen 5 x/week to address the above listed problems via self-care/home management, therapeutic activities, therapeutic exercises  Plan of Care expires: 08/27/17  Plan of Care reviewed with:      OT G-codes  Functional Assessment Tool Used: am pac  Score: 20  Functional Limitation: Self care  Self Care Current Status (): COOPER  Self Care Goal Status (): RONALD Vasquez OT  07/27/2017

## 2017-07-27 NOTE — PLAN OF CARE
Problem: Patient Care Overview  Goal: Plan of Care Review  sats 98% 2L NC , will continue to monitor.

## 2017-07-27 NOTE — PROGRESS NOTES
[facility]  Infectious Disease  Progress Note    Patient Name: Christiano Baer  MRN: [id number]  Admission Date: 7/25/2017  Length of Stay: 2 days  Attending Physician: Kenan Cox MD  Primary Care Provider: [facility]    Isolation Status: Airborne  Assessment/Plan:      * Staphylococcus aureus bacteremia    -Patient with recent hospitalization from 6/23/2017 - 6/27/2017 for sepsis 2/2 to CAP. Treated with 7-day course of moxifloxacin.   -Blood Culture during last admission were NGTD; Respiratory Culture grew streptococcus group F.   -Presented with cough, SOB, pleuritic chest pain.   -Imaging revealing marked architectural distortion/scarring in the RIGHT hemithorax similar to prior except for increased pleural thickening laterally. LEFT lung is hyperinflated with emphysematous change but clear acute disease.   -Febrile upon admission, WBC this AM 17.69, Pro-deandre elevated at 18.   -Blood Cultures from 7/25 revealing MRSA.   -Repeat Blood culture from 7/26 revealing NGTD.    -Currently on cefepime and vancomycin.   -Unclear etiology of bacteremia at this time, patient denies IVDU and has no chronic lines or skin breakdown. Etiology likely could be Staph Pneumonia vs Recurrence of TB. Recommend ordering repeat ECHO.   -Vanc Trough today 6.1. Have discussed with pharmacy and they will re-dose accordingly.   -Recommend continuing current antibiotic therapy at this time and rule out TB. Patient now on isolation. First AFB Culture and Smear pending. Will continue to follow.             Anticipated Disposition: Per primary team.     Thank you for your consult. I will follow-up with patient. Please contact us if you have any additional questions.    Willy Ruiz,   Infectious Disease  [facility]    Subjective:     Principal Problem:Staphylococcus aureus bacteremia    HPI:   Patient is a 57 y/o AA M with a PMH significant for COPD on 2L NC, hx of TB (treated in  "2003), and hx of alcohol abuse with pancreatitis in 2014. Patient was a poor historian upon examination and history was obtained per chart review. Patient was recently hospitalized at [facility] from 6/23/2017 - 6/26/2017 for sepsis with hypotension secondary to CAP. During that admission, he was treated with Vancomcyin/Cefepime/Flagyl/and Ciprofloxacin initially. Blood cultures during that admission revealed no growth however respiratory cultures revealed Streptococcus Group F. Mr. Delacruz subsequently improved clinically and his antibiotics were de-escalated to moxifloxacin. He was subsequently discharged on a 7 day course of moxifloxacin. On 07/25/2017 the patient presented back to [facility] with complaints of cough, SOB, and pleuritic chest pain. Patient was found to be tachycardic and hypotensive and he was treated for sepsis. CXR revealed marked architectural distortion/scarring in the RIGHT hemithorax and hyperinflated L lung with emphysematous change but clear of acute disease. WBC was 22.52, Pro-Sadi is elevated at 18, and the patient has been febrile with a Tmax of 100.6 in the past 24 hours. Patient was started on vancomycin, ciprofloxacin, and cefepime. Subsequent blood cultures from admission have grown staphylococcus aureus. Upon examination patient endorses productive cough, pleuritic chest pain, and SOB.      Microbiology Data  6/23/2017     Blood Cultures x 2       NGTD  6/23/2017     Urine Culture               No growth  6/23/2017     Respiratory Culture     Streptococcus Group F. Gram Stain with many gram positive cocci, many gram negative rods, moderate gram negative diplococci.  7/25/2017     Blood Culture x 2         MRSA  Susceptibility    Methicillin resistant staphylococcus aureus    CULTURE, BLOOD    Clindamycin <=0.5 "><=0.5  Sensitive    Erythromycin <=0.5 "><=0.5  Sensitive    Oxacillin >2  Resistant    Penicillin >8  Resistant    Tetracycline <=4 "><=4  Sensitive    Trimeth/Sulfa " "<=0.5/9.5 "><=0.5/9.5  Sensitive    Vancomycin 1  Sensitive     7/26/2017     Blood Culture x 2                 NGTD  7/26/2017     Respiratory Gram Stain       Few WBC's; Few Gram Positive Cocci     7/27/2017     AFB Culture/Smear              In Process     Antibiotics  Cefepime            2g IVPB q12h                7/25 - present  Ciprofloxacin       400mg IVPB x 1            7/25  Vancomycin        1g (in ED)/750mg qD    7/25 - present  Azithromycin        500mg/250mg qD        7/26 - present      Interval History: Patient was seen and examined at the bedside. Noted to be afebrile overnight. Vanc Trough 6.1 on 7/27. Mr. Baer states he was feeling better this afternoon. Endorses continued productive cough. Denies nausea/vomiting/diarrhea/constipation/fevers/chills.     Review of Systems  Objective:     Vital Signs (Most Recent):  Temp: 98.3 °F (36.8 °C) (07/27/17 0800)  Pulse: 95 (07/27/17 0800)  Resp: 18 (07/27/17 0800)  BP: 133/87 (07/27/17 0800)  SpO2: (!) 92 % (07/27/17 0752) Vital Signs (24h Range):  Temp:  [98.2 °F (36.8 °C)-99.4 °F (37.4 °C)] 98.3 °F (36.8 °C)  Pulse:  [] 95  Resp:  [16-20] 18  SpO2:  [87 %-98 %] 92 %  BP: (101-150)/(59-94) 133/87     Weight: 45.4 kg (100 lb)  Body mass index is 14.77 kg/m².    Estimated Creatinine Clearance: 64.6 mL/min (based on Cr of 0.8).    Physical Exam  General: AAOx3. NAD. Conversant.   HEENT: PERRL, EOMI, MMM, Poor dentition  Respiratory: Decreased breath sounds on R. No wheezing appreciated.   Cardiovascular: S1/S2. RRR. No murmurs appreciated.  Abdomen: Soft and non-tender. Normoactive bowel sounds.  Extremities: No LE edema noted. Moves all 4 extremities sponatenously.   Psych: Appropriate mood and affect    Significant Labs:   Blood Culture:   Recent Labs  Lab 06/23/17  1357 06/23/17  2155 07/25/17  0249 07/25/17  0250 07/26/17  1040   LABBLOO No growth after 5 days. No growth after 5 days.  No growth after 5 days. Gram stain aer bottle: Gram " positive cocci in clusters resembling Staph  Results called to and read back by:Philomena Cloud RN 07/25/2017  21:01  METHICILLIN RESISTANT STAPHYLOCOCCUS AUREUSID consult required at [facility]. Gram stain jennifer bottle: Gram positive cocci in clusters resembling Staph   Results called to and read back by:Mary Licona RN 07/26/2017  10:44  STAPHYLOCOCCUS AUREUSID consult required at [facility].For susceptibility see order #[id number] No Growth to date  No Growth to date     CBC:   Recent Labs  Lab 07/26/17  0443 07/27/17  0603   WBC 17.69* 9.33   HGB 12.6* 12.0*   HCT 37.6* 35.6*    173     CMP:   Recent Labs  Lab 07/26/17  0443 07/27/17  0603   * 137   K 4.4 4.1    107   CO2 21* 22*   GLU 79 69*   BUN 17 13   CREATININE 0.8 0.8   CALCIUM 8.3* 8.7   PROT  --  7.0   ALBUMIN  --  2.2*   BILITOT  --  0.8   ALKPHOS  --  76   AST  --  29   ALT  --  30   ANIONGAP 7* 8   EGFRNONAA >60 >60     Respiratory Culture:   Recent Labs  Lab 06/24/17  0521 07/26/17  1436   GSRESP <10 epithelial cells per low power field.  Rare WBC's  Many Gram positive cocci  Many Gram negative rods  Moderate Gram negative diplococci <10 epithelial cells per low power field.  Few WBC's  Few Gram positive cocci   RESPIRATORYC STREPTOCOCCUS GROUP FFewNormal respiratory ignacio also present  --      All pertinent labs within the past 24 hours have been reviewed.    Significant Imaging: I have reviewed all pertinent imaging results/findings within the past 24 hours.

## 2017-07-27 NOTE — ASSESSMENT & PLAN NOTE
-Patient with recent hospitalization from 6/23/2017 - 6/27/2017 for sepsis 2/2 to CAP. Treated with 7-day course of moxifloxacin.   -Blood Culture during last admission were NGTD; Respiratory Culture grew streptococcus group F.   -Presented with cough, SOB, pleuritic chest pain.   -Imaging revealing marked architectural distortion/scarring in the RIGHT hemithorax similar to prior except for increased pleural thickening laterally. LEFT lung is hyperinflated with emphysematous change but clear acute disease.   -Febrile upon admission, WBC this AM 17.69, Pro-deandre elevated at 18.   -Blood Cultures from 7/25 revealing MRSA.   -Repeat Blood culture from 7/26 revealing NGTD.    -Currently on cefepime and vancomycin.   -Unclear etiology of bacteremia at this time, patient denies IVDU and has no chronic lines or skin breakdown. Etiology likely could be Staph Pneumonia vs Recurrence of TB. Recommend ordering repeat ECHO.   -Vanc Trough today 6.1. Have discussed with pharmacy and they will re-dose accordingly.   -Recommend continuing current antibiotic therapy at this time and rule out TB. Patient now on isolation. First AFB Culture and Smear pending. Will continue to follow.

## 2017-07-27 NOTE — PROGRESS NOTES
[facility]  Progress Note    Patient Name: Christiano Baer  MRN: [id number]  Patient Class: IP- Inpatient   Admission Date: 7/25/2017  Length of Stay: 2 days  Attending Physician: Kenan Cox MD  Primary Care Provider: [facility]        Subjective:     Principal Problem:Staphylococcus aureus bacteremia    HPI:  Christiano Baer is a 56 y.o. black man with bullous emphysema due to cigarette smoking, chronic hypoxic respiratory failure (on 2 liters nasal cannula), history of tuberculosis, history of alcohol abuse with alcoholic pancreatitis on 2/23/14.  He  currently lives at [facility].         He was hospitalized at [facility] from 6/23/17 to 6/26/17 for right middle and lower lung pneumonia treated with moxifloxacin.  He was hypotensive on amlodipine, hydrochlorothiazide, and valsartan, which were discontinued at that time.  He was discharged to [facility] skilled nursing facility.       He returned to [facility] on 7/25/17 with nausea, shortness of breath, cough, pleuritic chest pain worsened by coughing, headache, and generalized weakness.  Initially his oxygen saturation was as low as 89% on 3 liters nasal cannula.  He was given albuterol-ipratropium nebulizer treatment and oxygen saturation improved to high 90s on 2 liters.  He was also tachycardic (pulse up to 140) and hypotensive (blood pressure as low as 83/54), which improved after being given IV fluids.  Temperature was 100.1 F.  WBC count was 22,530.   Procalcitonin elevated at 18. Total bilirubin elevated at 2.5.  BNP elevated at 425.  Chest x-ray showed increased lateral right pleural thickening. Blood cultures were collected and he was given cefepime, vancomycin, and ciprofloxacin.   Admitting to [facility].      Patient is a poor historian and appears anxious during my interview and exam.  He currently reports leg  pain and fatigue.  He denies CP, SOB, N/V.  States he stopped smoking after the last hospitalization.      Hospital Course:  7/25 blood cultures: gram positive cocci in clusters/Staph Aureus in both aerobic and anaerobic bottles. Pt on vancomycin. Consulted ID and ordered repeat blood cultures which are NG x 1 day. ID investigating possible recurrence of TB. Pt on airborne isolation now.      Interval History: Pt c/o body aches and fatigue.  Requesting to be left alone to be able to sleep instead of working with therapy. Explained that he needs to participate with therapy as much as he can.  Denies CP, SOB, GI complaints.     Review of Systems   Constitutional: Positive for fatigue. Negative for chills and fever.   Respiratory: Negative for shortness of breath.    Cardiovascular: Negative for chest pain.   Musculoskeletal: Positive for myalgias.     Objective:     Vital Signs (Most Recent):  Temp: 98.3 °F (36.8 °C) (07/27/17 0800)  Pulse: 95 (07/27/17 0800)  Resp: 18 (07/27/17 0800)  BP: 133/87 (07/27/17 0800)  SpO2: (!) 92 % (07/27/17 0752) Vital Signs (24h Range):  Temp:  [98.2 °F (36.8 °C)-99.4 °F (37.4 °C)] 98.3 °F (36.8 °C)  Pulse:  [] 95  Resp:  [16-20] 18  SpO2:  [87 %-98 %] 92 %  BP: (101-150)/(59-94) 133/87     Weight: 45.4 kg (100 lb)  Body mass index is 14.77 kg/m².    Intake/Output Summary (Last 24 hours) at 07/27/17 0958  Last data filed at 07/27/17 0900   Gross per 24 hour   Intake              375 ml   Output             2808 ml   Net            -2433 ml      Physical Exam   Constitutional: He is oriented to person, place, and time. No distress.   Cardiovascular: Normal rate and regular rhythm.    Pulmonary/Chest: Effort normal and breath sounds normal. No respiratory distress. He has no wheezes.   Musculoskeletal: He exhibits no edema.   Neurological: He is alert and oriented to person, place, and time.   Psychiatric: He has a normal mood and affect.   Nursing note and vitals  "reviewed.      Significant Labs:   BMP:   Recent Labs  Lab 07/26/17  0443 07/27/17  0603   GLU 79 69*   * 137   K 4.4 4.1    107   CO2 21* 22*   BUN 17 13   CREATININE 0.8 0.8   CALCIUM 8.3* 8.7   MG 1.8  --      CBC:   Recent Labs  Lab 07/26/17  0443 07/27/17  0603   WBC 17.69* 9.33   HGB 12.6* 12.0*   HCT 37.6* 35.6*    173     Vancomycin trough 6.1    Significant Imaging: no new    Assessment/Plan:      * Staphylococcus aureus bacteremia    Pneumonia of right lung  Afebrile, no elevation in WBC now.  Repeat blood cultures NG x 1 day.  7/25/17 bottles positive (aerobic and anaerobic) for gram positive cocci in clusters/Staph aureus. BP much improved. Normal lactic acid. Procalcitonin elevated at 18 on admission. CXR: increased lateral right pleural thickening.  Given cefepime, cipro and vanco in ED. Continue cefepime and vanco, day #3 for healthcare associated pneumonia with bacteremia.  ID added Azithromycin for atypicals, day #2. Supportive care with nebs, antipyretics, analgesia, cough medicine and supplemental oxygen.  Monitor.           Acute on chronic respiratory failure with hypoxia    COPD exacerbation, chronic bullous emphysema, supplemental oxygen dependent   Continue supplemental oxygen. Monitor.  Continue Breo, nebs.            History of TB (tuberculosis)    ID Folowing: "Patient with remote history of TB treated at [facility] in 2003 per patient report. Had old CXR here in our system and some of the lung findings on the right look old, but there are some changes that look new. Will rule out TB with sputum for AFB smear and culture X 3." On airborne isolation now.         Diastolic dysfunction    Pulmonary hypotension  Compensated at present. . No rales or edema on exam.  Monitor I&O and weight.           Tobacco abuse    Pt states that he stopped smoking after last hospitalization. Encouraged to continue with cessation.             VTE Risk Mitigation         Ordered "     Medium Risk of VTE  Once      07/25/17 0727     Place sequential compression device  Until discontinued      07/25/17 0727     Place MARSHA hose  Until discontinued      07/25/17 0727          Maricel Max PA-C  Department of [facility]   [facility]  Pager: [phone]    Attending: Kenan Cox MD

## 2017-07-27 NOTE — PROGRESS NOTES
Vancomycin Pharmacokinetics Monitoring Protocol  57 y/o male  Ht 69 in, Wt 45.4 kg, Scr 0.8, eCrCl ~ 64.6 ml/min  Indication: Staphylococcus aureus bacteremia    Target trough ~ 15 - 20 mcg/ml  Trough prior to the 4th dose came back = 6.1 mcg/ml    Current antibiotics: cefepime 2gram IV q12h and vancomycin 500mg IV q12h, azithromycin 250mg po daily    Based on his pharmacokinetics/protocol, d/w MD will give vancomycin 1000mg IV x 1, then adjust vancomycin to 750mg IV q12h with trough drawn prior to the next 3rd dose (7/28 @ 1400). Pharmacy will continue to follow.

## 2017-07-27 NOTE — PATIENT CARE CONFERENCE
Called by Dr. Stanford to obtain records of previous TB diagnosis and treatment.  Notified Carlos Mora at [facility] TB control with this request.

## 2017-07-27 NOTE — SUBJECTIVE & OBJECTIVE
Interval History: Patient was seen and examined at the bedside. Noted to be afebrile overnight. Vanc Trough 6.1 on 7/27.     Review of Systems  Objective:     Vital Signs (Most Recent):  Temp: 98.3 °F (36.8 °C) (07/27/17 0800)  Pulse: 95 (07/27/17 0800)  Resp: 18 (07/27/17 0800)  BP: 133/87 (07/27/17 0800)  SpO2: (!) 92 % (07/27/17 0752) Vital Signs (24h Range):  Temp:  [98.2 °F (36.8 °C)-99.4 °F (37.4 °C)] 98.3 °F (36.8 °C)  Pulse:  [] 95  Resp:  [16-20] 18  SpO2:  [87 %-98 %] 92 %  BP: (101-150)/(59-94) 133/87     Weight: 45.4 kg (100 lb)  Body mass index is 14.77 kg/m².    Estimated Creatinine Clearance: 64.6 mL/min (based on Cr of 0.8).    Physical Exam  General: AAOx3. NAD. Conversant.   HEENT: PERRL, EOMI, MMM, Poor dentition  Respiratory: Decreased breath sounds on R. No wheezing appreciated.   Cardiovascular: S1/S2. RRR. No murmurs appreciated.  Abdomen: Soft and non-tender. Normoactive bowel sounds.  Extremities: No LE edema noted. Moves all 4 extremities sponatenously.   Psych: Appropriate mood and affect    Significant Labs:   Blood Culture:   Recent Labs  Lab 06/23/17  1357 06/23/17  2155 07/25/17  0249 07/25/17  0250 07/26/17  1040   LABBLOO No growth after 5 days. No growth after 5 days.  No growth after 5 days. Gram stain aer bottle: Gram positive cocci in clusters resembling Staph  Results called to and read back by:Philomena Cloud RN 07/25/2017  21:01  METHICILLIN RESISTANT STAPHYLOCOCCUS AUREUSID consult required at [facility] and [place] locations. Gram stain jennifer bottle: Gram positive cocci in clusters resembling Staph   Results called to and read back by:Mary Licona RN 07/26/2017  10:44  STAPHYLOCOCCUS AUREUSID consult required at [facility] and [place] locations.For susceptibility see order #[id number] No Growth to date  No Growth to date     CBC:   Recent Labs  Lab 07/26/17  0443 07/27/17  0603   WBC 17.69* 9.33   HGB 12.6* 12.0*   HCT 37.6* 35.6*    173     CMP:    Recent Labs  Lab 07/26/17  0443 07/27/17  0603   * 137   K 4.4 4.1    107   CO2 21* 22*   GLU 79 69*   BUN 17 13   CREATININE 0.8 0.8   CALCIUM 8.3* 8.7   PROT  --  7.0   ALBUMIN  --  2.2*   BILITOT  --  0.8   ALKPHOS  --  76   AST  --  29   ALT  --  30   ANIONGAP 7* 8   EGFRNONAA >60 >60     Respiratory Culture:   Recent Labs  Lab 06/24/17  0521 07/26/17  1436   GSRESP <10 epithelial cells per low power field.  Rare WBC's  Many Gram positive cocci  Many Gram negative rods  Moderate Gram negative diplococci <10 epithelial cells per low power field.  Few WBC's  Few Gram positive cocci   RESPIRATORYC STREPTOCOCCUS GROUP FFewNormal respiratory ignacio also present  --      All pertinent labs within the past 24 hours have been reviewed.    Significant Imaging: I have reviewed all pertinent imaging results/findings within the past 24 hours.

## 2017-07-27 NOTE — PT/OT/SLP EVAL
Physical Therapy  Evaluation/Discharge    Christiano Baer   MRN: [id number]   Admitting Diagnosis: Bacteremia due to methicillin resistant Staphylococcus aureus    PT Received On: 07/27/17  PT Start Time: 1105     PT Stop Time: 1120    PT Total Time (min): 15 min       Billable Minutes:  Evaluation 15    Diagnosis: Bacteremia due to methicillin resistant Staphylococcus aureus  The primary encounter diagnosis was Sepsis, due to unspecified organism. Diagnoses of Staphylococcus aureus bacteremia, Pneumonia of right lung due to infectious organism, unspecified part of lung, and History of TB (tuberculosis) were also pertinent to this visit.      Past Medical History:   Diagnosis Date    Alcohol abuse 6/23/2017    Alcoholic pancreatitis 02/23/2014    COPD (chronic obstructive pulmonary disease)     ETOH abuse     Hypotension due to drugs 6/25/2017    Tuberculosis 2006      Past Surgical History:   Procedure Laterality Date    CHEST TUBE INSERTION  November 2013       Referring physician: Brooke  Date referred to PT: 7/27/2017    General Precautions: Standard, respiratory, fall  Orthopedic Precautions: N/A   Braces: N/A            Patient History:  Lives With: facility resident  Living Arrangements: extended care facility  Stair Railings at Home: none  Transportation Available: agency transportation  Living Environment Comment: Patient currently resident of Nursing facility prior to lived with family in an apartment  Equipment Currently Used at Home: oxygen  DME owned (not currently used): none    Previous Level of Function:  Ambulation Skills: independent  Transfer Skills: independent  ADL Skills: independent    Subjective:  Communicated with primary nurs prior to session.    Chief Complaint: SOB  Patient goals: wants to go home not nursing facility    Pain/Comfort  Pain Rating 1: 0/10 (no pain complaints voiced)  Pain Rating Post-Intervention 1: 0/10      Objective:   Patient found with: telemetry, oxygen      Cognitive Exam:  Oriented to: Person, Place, Time and Situation    Follows Commands/attention: Follows two-step commands  Communication: clear/fluent  Safety awareness/insight to disability: impaired    Physical Exam:  Postural examination/scapula alignment: Rounded shoulder and Head forward    Skin integrity: Visible skin intact  Edema: None noted either LE    Sensation:   Intact    Upper Extremity Range of Motion:  Right Upper Extremity: WFL  Left Upper Extremity: WFL    Upper Extremity Strength:  Right Upper Extremity: WFL  Left Upper Extremity: WFL    Lower Extremity Range of Motion:  Right Lower Extremity: WFL  Left Lower Extremity: WFL    Lower Extremity Strength:  Right Lower Extremity: WFL  Left Lower Extremity: WFL     Fine motor coordination:  Intact    Gross motor coordination: WFL    Functional Mobility:  Bed Mobility:  Supine to Sit: Modified Independent  Sit to Supine: Modified Independent    Transfers:  Sit <> Stand Assistance: Modified Independent    Gait:   Gait Distance: gait Mod I to supervision in room  Assistance 1: Supervision  Gait Assistive Device: No device  Gait Pattern: reciprocal  Gait Deviation(s): decreased kye    Stairs:  na    Balance:   Static Sit: GOOD: Takes MODERATE challenges from all directions  Dynamic Sit: GOOD: Maintains balance through MODERATE excursions of active trunk movement  Static Stand: GOOD: Takes MODERATE challenges from all directions  Dynamic stand: GOOD: Needs SUPERVISION only during gait and able to self right with moderate     Therapeutic Activities and Exercises:  na    AM-PAC 6 CLICK MOBILITY  How much help from another person does this patient currently need?   1 = Unable, Total/Dependent Assistance  2 = A lot, Maximum/Moderate Assistance  3 = A little, Minimum/Contact Guard/Supervision  4 = None, Modified [place]/Independent    Turning over in bed (including adjusting bedclothes, sheets and blankets)?: 4  Sitting down on and standing up from a  chair with arms (e.g., wheelchair, bedside commode, etc.): 4  Moving from lying on back to sitting on the side of the bed?: 4  Moving to and from a bed to a chair (including a wheelchair)?: 4  Need to walk in hospital room?: 4  Climbing 3-5 steps with a railing?: 3  Total Score: 23     AM-PAC Raw Score CMS G-Code Modifier Level of Impairment Assistance   6 % Total / Unable   7 - 9 CM 80 - 100% Maximal Assist   10 - 14 CL 60 - 80% Moderate Assist   15 - 19 CK 40 - 60% Moderate Assist   20 - 22 CJ 20 - 40% Minimal Assist   23 CI 1-20% SBA / CGA   24 CH 0% Independent/ Mod I     Patient left sitting EOB with all lines intact and call button in reach.    Assessment:   Christiano Baer is a 56 y.o. male with a medical diagnosis of Bacteremia due to methicillin resistant Staphylococcus aureus and presents with weakness and cardiopulomonary dysfunction . O2 sat decrease to ~89/88 % with activity..    Rehab identified problem list/impairments: Rehab identified problem list/impairments: weakness, impaired balance, impaired cardiopulmonary response to activity    Rehab potential is good.    Activity tolerance: Good    Discharge recommendations: Discharge Facility/Level Of Care Needs:  (TBD)     Barriers to discharge: Barriers to Discharge: Decreased caregiver support    Equipment recommendations: Equipment Needed After Discharge:  (TBD)     GOALS:    Physical Therapy Goals     Not on file          Multidisciplinary Problems (Resolved)        Problem: Physical Therapy Goal    Goal Priority Disciplines Outcome Goal Variances Interventions   Physical Therapy Goal   (Resolved)     PT/OT, PT Outcome(s) achieved     Description:  Goals to be met by: 7/27/2017     No skilled PT needs                          PLAN:      Patient to be ambulated per Mobility Tech, no skilled PT needs noted. Will DC PT needs    Shane Pineda, PT  07/27/2017

## 2017-07-27 NOTE — ASSESSMENT & PLAN NOTE
Pneumonia of right lung  Afebrile, no elevation in WBC now.  Repeat blood cultures NG x 1 day.  7/25/17 bottles positive (aerobic and anaerobic) for gram positive cocci in clusters/Staph aureus. BP much improved. Normal lactic acid. Procalcitonin elevated at 18 on admission. CXR: increased lateral right pleural thickening.  Given cefepime, cipro and vanco in ED. Continue cefepime and vanco, day #3 for healthcare associated pneumonia with bacteremia.  ID added Azithromycin for atypicals, day #2. Supportive care with nebs, antipyretics, analgesia, cough medicine and supplemental oxygen.  Monitor.

## 2017-07-27 NOTE — SUBJECTIVE & OBJECTIVE
Interval History: Pt c/o body aches and fatigue.  Requesting to be left alone to be able to sleep instead of working with therapy. Explained that he needs to participate with therapy as much as he can.  Denies CP, SOB, GI complaints.     Review of Systems   Constitutional: Positive for fatigue. Negative for chills and fever.   Respiratory: Negative for shortness of breath.    Cardiovascular: Negative for chest pain.   Musculoskeletal: Positive for myalgias.     Objective:     Vital Signs (Most Recent):  Temp: 98.3 °F (36.8 °C) (07/27/17 0800)  Pulse: 95 (07/27/17 0800)  Resp: 18 (07/27/17 0800)  BP: 133/87 (07/27/17 0800)  SpO2: (!) 92 % (07/27/17 0752) Vital Signs (24h Range):  Temp:  [98.2 °F (36.8 °C)-99.4 °F (37.4 °C)] 98.3 °F (36.8 °C)  Pulse:  [] 95  Resp:  [16-20] 18  SpO2:  [87 %-98 %] 92 %  BP: (101-150)/(59-94) 133/87     Weight: 45.4 kg (100 lb)  Body mass index is 14.77 kg/m².    Intake/Output Summary (Last 24 hours) at 07/27/17 0958  Last data filed at 07/27/17 0900   Gross per 24 hour   Intake              375 ml   Output             2808 ml   Net            -2433 ml      Physical Exam   Constitutional: He is oriented to person, place, and time. No distress.   Cardiovascular: Normal rate and regular rhythm.    Pulmonary/Chest: Effort normal and breath sounds normal. No respiratory distress. He has no wheezes.   Musculoskeletal: He exhibits no edema.   Neurological: He is alert and oriented to person, place, and time.   Psychiatric: He has a normal mood and affect.   Nursing note and vitals reviewed.      Significant Labs:   BMP:   Recent Labs  Lab 07/26/17  0443 07/27/17  0603   GLU 79 69*   * 137   K 4.4 4.1    107   CO2 21* 22*   BUN 17 13   CREATININE 0.8 0.8   CALCIUM 8.3* 8.7   MG 1.8  --      CBC:   Recent Labs  Lab 07/26/17  0443 07/27/17  0603   WBC 17.69* 9.33   HGB 12.6* 12.0*   HCT 37.6* 35.6*    173     Vancomycin trough 6.1    Significant Imaging: no new

## 2017-07-27 NOTE — PLAN OF CARE
Problem: Patient Care Overview  Goal: Plan of Care Review  Outcome: Ongoing (interventions implemented as appropriate)  Plan of care reviewed with patient. Patient verbalize understanding denies any c/o pain at this time. Fall risk precaution maintained will continue to monitor.

## 2017-07-27 NOTE — PLAN OF CARE
Problem: Patient Care Overview  Goal: Plan of Care Review  Outcome: Ongoing (interventions implemented as appropriate)  Plan of care reviewed with patient. Voices understanding. R/O TB airborne isolation precautions maintained. Patient will be monitored overnight.

## 2017-07-27 NOTE — PLAN OF CARE
Problem: Physical Therapy Goal  Goal: Physical Therapy Goal  Goals to be met by: 7/27/2017     No skilled PT needs        Outcome: Outcome(s) achieved Date Met: 07/27/17  Patient ambulating with Mobility Tech  Back to nursing facility    Will DC PT service at this time

## 2017-07-27 NOTE — ASSESSMENT & PLAN NOTE
"ID Folowing: "Patient with remote history of TB treated at [facility] in 2003 per patient report. Had old CXR here in our system and some of the lung findings on the right look old, but there are some changes that look new. Will rule out TB with sputum for AFB smear and culture X 3." On airborne isolation now.   "

## 2017-07-27 NOTE — PLAN OF CARE
Problem: Occupational Therapy Goal  Goal: Occupational Therapy Goal  Outcome: Ongoing (interventions implemented as appropriate)  Pt would benefit from cont OT services in order to maximize functional independence. Recommendations ongoing at this time.

## 2017-07-27 NOTE — ASSESSMENT & PLAN NOTE
COPD exacerbation, chronic bullous emphysema, supplemental oxygen dependent   Continue supplemental oxygen. Monitor.  Continue Breo, nebs.

## 2017-07-28 PROBLEM — J15.212 PNEUMONIA OF RIGHT LUNG DUE TO METHICILLIN RESISTANT STAPHYLOCOCCUS AUREUS (MRSA): Status: ACTIVE | Noted: 2017-06-23

## 2017-07-28 PROBLEM — Z87.891 FORMER SMOKER: Status: ACTIVE | Noted: 2017-06-23

## 2017-07-28 PROBLEM — J96.21 ACUTE ON CHRONIC RESPIRATORY FAILURE WITH HYPOXIA: Status: RESOLVED | Noted: 2017-06-23 | Resolved: 2017-07-28

## 2017-07-28 LAB
ANION GAP SERPL CALC-SCNC: 6 MMOL/L
BACTERIA BLD CULT: NORMAL
BASOPHILS # BLD AUTO: 0.04 K/UL
BASOPHILS NFR BLD: 0.7 %
BUN SERPL-MCNC: 15 MG/DL
CALCIUM SERPL-MCNC: 9 MG/DL
CHLORIDE SERPL-SCNC: 103 MMOL/L
CO2 SERPL-SCNC: 26 MMOL/L
CREAT SERPL-MCNC: 0.8 MG/DL
DIASTOLIC DYSFUNCTION: YES
DIFFERENTIAL METHOD: ABNORMAL
EOSINOPHIL # BLD AUTO: 0.3 K/UL
EOSINOPHIL NFR BLD: 5.8 %
ERYTHROCYTE [DISTWIDTH] IN BLOOD BY AUTOMATED COUNT: 13.5 %
EST. GFR  (AFRICAN AMERICAN): >60 ML/MIN/1.73 M^2
EST. GFR  (NON AFRICAN AMERICAN): >60 ML/MIN/1.73 M^2
ESTIMATED PA SYSTOLIC PRESSURE: 56.72
GLUCOSE SERPL-MCNC: 71 MG/DL
HCT VFR BLD AUTO: 34 %
HGB BLD-MCNC: 11.6 G/DL
LYMPHOCYTES # BLD AUTO: 1.5 K/UL
LYMPHOCYTES NFR BLD: 26.1 %
MAGNESIUM SERPL-MCNC: 1.3 MG/DL
MCH RBC QN AUTO: 31.7 PG
MCHC RBC AUTO-ENTMCNC: 34.1 G/DL
MCV RBC AUTO: 93 FL
MITRAL VALVE MOBILITY: NORMAL
MONOCYTES # BLD AUTO: 0.8 K/UL
MONOCYTES NFR BLD: 14 %
NEUTROPHILS # BLD AUTO: 3 K/UL
NEUTROPHILS NFR BLD: 52.5 %
PHOSPHATE SERPL-MCNC: 4 MG/DL
PLATELET # BLD AUTO: 176 K/UL
PMV BLD AUTO: 9.4 FL
POTASSIUM SERPL-SCNC: 4 MMOL/L
RBC # BLD AUTO: 3.66 M/UL
RETIRED EF AND QEF - SEE NOTES: 55 (ref 55–65)
SODIUM SERPL-SCNC: 135 MMOL/L
TRICUSPID VALVE REGURGITATION: ABNORMAL
VANCOMYCIN TROUGH SERPL-MCNC: 9.3 UG/ML
WBC # BLD AUTO: 5.66 K/UL

## 2017-07-28 PROCEDURE — 83735 ASSAY OF MAGNESIUM: CPT

## 2017-07-28 PROCEDURE — 25000003 PHARM REV CODE 250: Performed by: STUDENT IN AN ORGANIZED HEALTH CARE EDUCATION/TRAINING PROGRAM

## 2017-07-28 PROCEDURE — 25000003 PHARM REV CODE 250: Performed by: INTERNAL MEDICINE

## 2017-07-28 PROCEDURE — 94640 AIRWAY INHALATION TREATMENT: CPT

## 2017-07-28 PROCEDURE — 63600175 PHARM REV CODE 636 W HCPCS: Performed by: STUDENT IN AN ORGANIZED HEALTH CARE EDUCATION/TRAINING PROGRAM

## 2017-07-28 PROCEDURE — 25000242 PHARM REV CODE 250 ALT 637 W/ HCPCS: Performed by: NURSE PRACTITIONER

## 2017-07-28 PROCEDURE — 27000221 HC OXYGEN, UP TO 24 HOURS

## 2017-07-28 PROCEDURE — 25000003 PHARM REV CODE 250: Performed by: PHYSICIAN ASSISTANT

## 2017-07-28 PROCEDURE — 80048 BASIC METABOLIC PNL TOTAL CA: CPT

## 2017-07-28 PROCEDURE — 63600175 PHARM REV CODE 636 W HCPCS: Performed by: PHYSICIAN ASSISTANT

## 2017-07-28 PROCEDURE — 94761 N-INVAS EAR/PLS OXIMETRY MLT: CPT

## 2017-07-28 PROCEDURE — 85025 COMPLETE CBC W/AUTO DIFF WBC: CPT

## 2017-07-28 PROCEDURE — 25000242 PHARM REV CODE 250 ALT 637 W/ HCPCS: Performed by: PHYSICIAN ASSISTANT

## 2017-07-28 PROCEDURE — 25000003 PHARM REV CODE 250: Performed by: NURSE PRACTITIONER

## 2017-07-28 PROCEDURE — 36415 COLL VENOUS BLD VENIPUNCTURE: CPT

## 2017-07-28 PROCEDURE — 84100 ASSAY OF PHOSPHORUS: CPT

## 2017-07-28 PROCEDURE — 11000001 HC ACUTE MED/SURG PRIVATE ROOM

## 2017-07-28 PROCEDURE — 80202 ASSAY OF VANCOMYCIN: CPT

## 2017-07-28 PROCEDURE — 25000003 PHARM REV CODE 250: Performed by: HOSPITALIST

## 2017-07-28 RX ORDER — LANOLIN ALCOHOL/MO/W.PET/CERES
400 CREAM (GRAM) TOPICAL 2 TIMES DAILY
Status: COMPLETED | OUTPATIENT
Start: 2017-07-28 | End: 2017-07-30

## 2017-07-28 RX ADMIN — IPRATROPIUM BROMIDE AND ALBUTEROL SULFATE 3 ML: 2.5; .5 SOLUTION RESPIRATORY (INHALATION) at 08:07

## 2017-07-28 RX ADMIN — ACETAMINOPHEN 650 MG: 325 TABLET ORAL at 08:07

## 2017-07-28 RX ADMIN — IPRATROPIUM BROMIDE AND ALBUTEROL SULFATE 3 ML: 2.5; .5 SOLUTION RESPIRATORY (INHALATION) at 04:07

## 2017-07-28 RX ADMIN — MAGNESIUM OXIDE TAB 400 MG (241.3 MG ELEMENTAL MG) 400 MG: 400 (241.3 MG) TAB at 09:07

## 2017-07-28 RX ADMIN — AZITHROMYCIN 250 MG: 250 TABLET, FILM COATED ORAL at 08:07

## 2017-07-28 RX ADMIN — POTASSIUM & SODIUM PHOSPHATES POWDER PACK 280-160-250 MG 1 PACKET: 280-160-250 PACK at 08:07

## 2017-07-28 RX ADMIN — CEFEPIME HYDROCHLORIDE 2 G: 2 INJECTION, SOLUTION INTRAVENOUS at 04:07

## 2017-07-28 RX ADMIN — ACETAMINOPHEN 650 MG: 325 TABLET ORAL at 06:07

## 2017-07-28 RX ADMIN — VANCOMYCIN HYDROCHLORIDE 1000 MG: 1 INJECTION, POWDER, LYOPHILIZED, FOR SOLUTION INTRAVENOUS at 04:07

## 2017-07-28 RX ADMIN — GABAPENTIN 300 MG: 300 CAPSULE ORAL at 09:07

## 2017-07-28 RX ADMIN — VANCOMYCIN HYDROCHLORIDE 750 MG: 750 INJECTION, POWDER, LYOPHILIZED, FOR SOLUTION INTRAVENOUS at 06:07

## 2017-07-28 RX ADMIN — IPRATROPIUM BROMIDE AND ALBUTEROL SULFATE 3 ML: 2.5; .5 SOLUTION RESPIRATORY (INHALATION) at 12:07

## 2017-07-28 RX ADMIN — FLUTICASONE FUROATE AND VILANTEROL TRIFENATATE 1 PUFF: 200; 25 POWDER RESPIRATORY (INHALATION) at 08:07

## 2017-07-28 RX ADMIN — GABAPENTIN 300 MG: 300 CAPSULE ORAL at 08:07

## 2017-07-28 NOTE — PLAN OF CARE
Problem: Patient Care Overview  Goal: Plan of Care Review  Outcome: Ongoing (interventions implemented as appropriate)  Patient awake and alert denies any c/o pain. Abt therapy continued fall precaution maintained bed in the lowest position call light within reach. Will cont to monitor.

## 2017-07-28 NOTE — ASSESSMENT & PLAN NOTE
Presented with sepsis with hypotension, tachycardia and fever; blood cultures with MRSA on 7/25/2017; started on IV abx with repeat blood cultures NGTD; ID consulted; no identified source of bacteremia therefore JANET indicated to rule out endocarditis; will plan to proceed with JANET on Monday for further evaluation; further recs to follow once JANET completed

## 2017-07-28 NOTE — PLAN OF CARE
Problem: Patient Care Overview  Goal: Plan of Care Review  Outcome: Ongoing (interventions implemented as appropriate)  Pt received on NC at 3 LPM. Pt SpO2 95%. No respiratory distress. Will continue to monitor.

## 2017-07-28 NOTE — ASSESSMENT & PLAN NOTE
-Patient with recent hospitalization from 6/23/2017 - 6/27/2017 for sepsis 2/2 to CAP. Treated with 7-day course of moxifloxacin.   -Blood Culture during last admission were NGTD; Respiratory Culture grew streptococcus group F.   -Presented with cough, SOB, pleuritic chest pain.   -Imaging revealing marked architectural distortion/scarring in the RIGHT hemithorax similar to prior except for increased pleural thickening laterally. LEFT lung is hyperinflated with emphysematous change but clear acute disease.   -Febrile upon admission, WBC this AM 17.69, Pro-deandre elevated at 18.   -Blood Cultures from 7/25 revealing MRSA.   -Repeat Blood culture from 7/26 revealing NGTD.    -De-escalated antibiotics per primary and now only on vancomycin - agree with de-escalation.   -Unclear etiology of bacteremia at this time, patient denies IVDU and has no chronic lines or skin breakdown. Etiology likely could be Staph Pneumonia vs Recurrence of TB. -Repeat ECHO revealing no evidence of pericardial effusion, intracavity mass, thrombi, or vegetation. Plan for JANET on 7/31.   -Vanc Trough 6.1 on 7/27. Have discussed with pharmacy and they will re-dose accordingly (increased to 1g)  -Recommend continuing current antibiotic therapy at this time and rule out TB. Patient now on isolation. First AFB Culture and Smear pending. Will continue to follow.   -Recommend CT Chest to look for empyema or lung abscess.    Paramedian Forehead Flap Text: A decision was made to reconstruct the defect utilizing an interpolation axial flap and a staged reconstruction.  A telfa template was made of the defect.  This telfa template was then used to outline the paramedian forehead pedicle flap.  The donor area for the pedicle flap was then injected with anesthesia.  The flap was excised through the skin and subcutaneous tissue down to the layer of the underlying musculature.  The pedicle flap was carefully excised within this deep plane to maintain its blood supply.  The edges of the donor site were undermined.   The donor site was closed in a primary fashion.  The pedicle was then rotated into position and sutured.  Once the tube was sutured into place, adequate blood supply was confirmed with blanching and refill.  The pedicle was then wrapped with xeroform gauze and dressed appropriately with a telfa and gauze bandage to ensure continued blood supply and protect the attached pedicle.

## 2017-07-28 NOTE — PROGRESS NOTES
[facility] Hospital Medicine  Progress Note    Patient Name: Christiano Baer  MRN: [id number]  Patient Class: IP- Inpatient   Admission Date: 7/25/2017  Length of Stay: 3 days  Attending Physician: Kenan Cox MD  Primary Care Provider: [facility]        Subjective:     Principal Problem:Bacteremia due to methicillin resistant Staphylococcus aureus    HPI:  Christiano Baer is a 56 y.o. black man with bullous emphysema due to cigarette smoking (stopped smoking on 6/23/17), chronic hypoxic respiratory failure (on 2 liters nasal cannula), history of tuberculosis, history of alcohol abuse with alcoholic pancreatitis on 2/23/14.   He lives in [place], [place].     He was hospitalized at [facility] from 6/23/17 to 6/26/17 for right middle and lower lung pneumonia treated with moxifloxacin.  He was hypotensive on amlodipine, hydrochlorothiazide, and valsartan, which were discontinued at that time.  He was discharged to [facility] skilled nursing facility.       He returned to [facility] on 7/25/17 with nausea, shortness of breath, cough, pleuritic chest pain worsened by coughing, headache, and generalized weakness.  Initially his oxygen saturation was as low as 89% on 3 liters nasal cannula.  He was given albuterol-ipratropium nebulizer treatment and oxygen saturation improved to high 90s on 2 liters.  He was also tachycardic (pulse up to 140) and hypotensive (blood pressure as low as 83/54), which improved after being given IV fluids.  Temperature was 100.1 F.  WBC count was 22,530.   Procalcitonin elevated at 18. Total bilirubin elevated at 2.5.  BNP elevated at 425.  Chest x-ray showed increased lateral right pleural thickening. Blood cultures were collected and he was given cefepime, vancomycin, and ciprofloxacin.   He was admitted to [facility].   He complained of leg pain and fatigue.  He is a poor  historian and appeared anxious during on admission.     Hospital Course:  WBC count was 15,870 when he was discharged on 6/26/17, 22,530 on admission, and decreased to normal by 7/27/17 on cefepime and vancomycin.  Blood cultures on admission grew methicillin-resistant Staphylococcus aureus.  Sputum culture also grew it.  Infectious Disease was consulted.  They added azithromycin for his pneumonia and investigated possible recurrence of tuberculosis.  They recommended JANET after a TTE showed no vegetation.  Cardiology discussed this with him and he agreed.      Interval History: Declined Occupational Therapy session this morning despite being advised he needs to work with therapy to return to the SNF.    Review of Systems   Constitutional: Positive for fatigue. Negative for chills and fever.   Respiratory: Negative for shortness of breath.    Cardiovascular: Negative for chest pain.   Musculoskeletal: Positive for myalgias.     Objective:     Vital Signs (Most Recent):  Temp: 98 °F (36.7 °C) (07/28/17 1200)  Pulse: (!) 114 (07/28/17 1200)  Resp: 20 (07/28/17 1200)  BP: 113/85 (07/28/17 1200)  SpO2: 96 % (07/28/17 1200) Vital Signs (24h Range):  Temp:  [98 °F (36.7 °C)-98.6 °F (37 °C)] 98 °F (36.7 °C)  Pulse:  [] 114  Resp:  [16-20] 20  SpO2:  [95 %-100 %] 96 %  BP: (109-147)/(78-94) 113/85     Weight: 45.4 kg (100 lb)  Body mass index is 14.77 kg/m².    Intake/Output Summary (Last 24 hours) at 07/28/17 1319  Last data filed at 07/28/17 0800   Gross per 24 hour   Intake             1305 ml   Output             1600 ml   Net             -295 ml      Physical Exam   Constitutional: He is oriented to person, place, and time. No distress.   Cardiovascular: Normal rate and regular rhythm.    Pulmonary/Chest: Effort normal and breath sounds normal. No respiratory distress. He has no wheezes.   Musculoskeletal: He exhibits no edema.   Neurological: He is alert and oriented to person, place, and time.   Psychiatric: He  has a normal mood and affect.   Nursing note and vitals reviewed.      Significant Labs:   BMP:     Recent Labs  Lab 07/28/17  0626   GLU 71   *   K 4.0      CO2 26   BUN 15   CREATININE 0.8   CALCIUM 9.0   MG 1.3*     CBC:     Recent Labs  Lab 07/27/17  0603 07/28/17  0626   WBC 9.33 5.66   HGB 12.0* 11.6*   HCT 35.6* 34.0*    176     Significant Imaging:   X-Ray Chest AP Portable 7/25/17: Marked architectural distortion/scarring in the RIGHT hemithorax similar to prior except for increased pleural thickening laterally. LEFT lung is hyperinflated with emphysematous change but clear acute disease. Heart size is stable and mediastinum remain shifted slightly to the RIGHT, unchanged.  2D echo with color flow doppler 7/27/17:     1 - Normal left ventricular systolic function (EF 55-60%).     2 - Pulmonary hypertension. The estimated PA systolic pressure is 57 mmHg.     3 - Right ventricular enlargement with normal systolic function.     4 - Impaired LV relaxation, normal LAP (grade 1 diastolic dysfunction).     Assessment/Plan:      * Bacteremia due to methicillin resistant Staphylococcus aureus    Pneumonia of right lung due to methicillin resistant Staphylococcus aureus (MRSA)  Stop cefepime and azithromycin.  Continue vancomycin.  JANET Monday.          History of TB (tuberculosis)    Airborne isolation.  Collecting sputum samples for AFB per ID recommendations.        Chronic bullous emphysema    Supplemental oxygen dependent  Takes mometasone-formoterol (Dulera) 200-5 mcg BID, albuterol inhaler PRN, albuterol-ipratropium nebulizer PRN.  Fluticasone-vilanterol 200-25 mcg daily in place of Dulera.  Continue nasal cannula.          Diastolic dysfunction    Pulmonary hypotension  Monitor for fluid overload.          Former smoker    Stopped after last hospitalization on 6/23/17.            VTE Risk Mitigation         Ordered     Medium Risk of VTE  Once      07/25/17 0727     Place sequential  compression device  Until discontinued      07/25/17 0727     Place MARSHA hose  Until discontinued      07/25/17 0727        Disposition: Pending JANET Monday to determine duration of antibiotic.  Then return to [facility] to complete course.    Kenan Cox MD  Department of Hospital Medicine   [facility]

## 2017-07-28 NOTE — SUBJECTIVE & OBJECTIVE
Interval History: Declined Occupational Therapy session this morning despite being advised he needs to work with therapy to return to the SNF.    Review of Systems   Constitutional: Positive for fatigue. Negative for chills and fever.   Respiratory: Negative for shortness of breath.    Cardiovascular: Negative for chest pain.   Musculoskeletal: Positive for myalgias.     Objective:     Vital Signs (Most Recent):  Temp: 98 °F (36.7 °C) (07/28/17 1200)  Pulse: (!) 114 (07/28/17 1200)  Resp: 20 (07/28/17 1200)  BP: 113/85 (07/28/17 1200)  SpO2: 96 % (07/28/17 1200) Vital Signs (24h Range):  Temp:  [98 °F (36.7 °C)-98.6 °F (37 °C)] 98 °F (36.7 °C)  Pulse:  [] 114  Resp:  [16-20] 20  SpO2:  [95 %-100 %] 96 %  BP: (109-147)/(78-94) 113/85     Weight: 45.4 kg (100 lb)  Body mass index is 14.77 kg/m².    Intake/Output Summary (Last 24 hours) at 07/28/17 1319  Last data filed at 07/28/17 0800   Gross per 24 hour   Intake             1305 ml   Output             1600 ml   Net             -295 ml      Physical Exam   Constitutional: He is oriented to person, place, and time. No distress.   Cardiovascular: Normal rate and regular rhythm.    Pulmonary/Chest: Effort normal and breath sounds normal. No respiratory distress. He has no wheezes.   Musculoskeletal: He exhibits no edema.   Neurological: He is alert and oriented to person, place, and time.   Psychiatric: He has a normal mood and affect.   Nursing note and vitals reviewed.      Significant Labs:   BMP:     Recent Labs  Lab 07/28/17  0626   GLU 71   *   K 4.0      CO2 26   BUN 15   CREATININE 0.8   CALCIUM 9.0   MG 1.3*     CBC:     Recent Labs  Lab 07/27/17  0603 07/28/17  0626   WBC 9.33 5.66   HGB 12.0* 11.6*   HCT 35.6* 34.0*    176     Significant Imaging:   X-Ray Chest AP Portable 7/25/17: Marked architectural distortion/scarring in the RIGHT hemithorax similar to prior except for increased pleural thickening laterally. LEFT lung is  hyperinflated with emphysematous change but clear acute disease. Heart size is stable and mediastinum remain shifted slightly to the RIGHT, unchanged.  2D echo with color flow doppler 7/27/17:     1 - Normal left ventricular systolic function (EF 55-60%).     2 - Pulmonary hypertension. The estimated PA systolic pressure is 57 mmHg.     3 - Right ventricular enlargement with normal systolic function.     4 - Impaired LV relaxation, normal LAP (grade 1 diastolic dysfunction).

## 2017-07-28 NOTE — ASSESSMENT & PLAN NOTE
Pneumonia of right lung due to methicillin resistant Staphylococcus aureus (MRSA)  Stop cefepime and azithromycin.  Continue vancomycin.  JANET Monday.

## 2017-07-28 NOTE — PROGRESS NOTES
[facility]  Infectious Disease  Progress Note    Patient Name: Christiano Baer  MRN: [id number]  Admission Date: 7/25/2017  Length of Stay: 3 days  Attending Physician: Kenan Cox MD  Primary Care Provider: [facility]    Isolation Status: Contact and Airborne  Assessment/Plan:      * Bacteremia due to methicillin resistant Staphylococcus aureus    -Patient with recent hospitalization from 6/23/2017 - 6/27/2017 for sepsis 2/2 to CAP. Treated with 7-day course of moxifloxacin.   -Blood Culture during last admission were NGTD; Respiratory Culture grew streptococcus group F.   -Presented with cough, SOB, pleuritic chest pain.   -Imaging revealing marked architectural distortion/scarring in the RIGHT hemithorax similar to prior except for increased pleural thickening laterally. LEFT lung is hyperinflated with emphysematous change but clear acute disease.   -Febrile upon admission, WBC this AM 17.69, Pro-deandre elevated at 18.   -Blood Cultures from 7/25 revealing MRSA.   -Repeat Blood culture from 7/26 revealing NGTD.    -De-escalated antibiotics per primary and now only on vancomycin - agree with de-escalation.   -Unclear etiology of bacteremia at this time, patient denies IVDU and has no chronic lines or skin breakdown. Etiology likely could be Staph Pneumonia vs Recurrence of TB. -Repeat ECHO revealing no evidence of pericardial effusion, intracavity mass, thrombi, or vegetation. Plan for JANET on 7/31.   -Vanc Trough 6.1 on 7/27. Have discussed with pharmacy and they will re-dose accordingly (increased to 1g)  -Recommend continuing current antibiotic therapy at this time and rule out TB. Patient now on isolation. First AFB Culture and Smear pending. Will continue to follow.   -Recommend CT Chest to look for empyema or lung abscess.             Anticipated Disposition: Per primary team.     Thank you for your consult. I will follow-up with patient. Please contact us if you have any  additional questions.    Willy Ruiz,   Infectious Disease  [facility]    Subjective:     Principal Problem:Bacteremia due to methicillin resistant Staphylococcus aureus    HPI:   Patient is a 55 y/o AA M with a PMH significant for COPD on 2L NC, hx of TB (treated in 2003), and hx of alcohol abuse with pancreatitis in 2014. Patient was a poor historian upon examination and history was obtained per chart review. Patient was recently hospitalized at [facility] from 6/23/2017 - 6/26/2017 for sepsis with hypotension secondary to CAP. During that admission, he was treated with Vancomcyin/Cefepime/Flagyl/and Ciprofloxacin initially. Blood cultures during that admission revealed no growth however respiratory cultures revealed Streptococcus Group F. Mr. Delacruz subsequently improved clinically and his antibiotics were de-escalated to moxifloxacin. He was subsequently discharged on a 7 day course of moxifloxacin. On 07/25/2017 the patient presented back to [facility] with complaints of cough, SOB, and pleuritic chest pain. Patient was found to be tachycardic and hypotensive and he was treated for sepsis. CXR revealed marked architectural distortion/scarring in the RIGHT hemithorax and hyperinflated L lung with emphysematous change but clear of acute disease. WBC was 22.52, Pro-Sadi is elevated at 18, and the patient has been febrile with a Tmax of 100.6 in the past 24 hours. Patient was started on vancomycin, ciprofloxacin, and cefepime. Subsequent blood cultures from admission have grown staphylococcus aureus. Upon examination patient endorses productive cough, pleuritic chest pain, and SOB.      Microbiology Data  6/23/2017     Blood Cultures x 2       NGTD  6/23/2017     Urine Culture               No growth  6/23/2017     Respiratory Culture     Streptococcus Group F. Gram Stain with many gram positive cocci, many gram negative rods, moderate gram negative diplococci.  7/25/2017     Blood  "Culture x 2         MRSA  Susceptibility    Methicillin resistant staphylococcus aureus    CULTURE, BLOOD    Clindamycin <=0.5 "><=0.5  Sensitive    Erythromycin <=0.5 "><=0.5  Sensitive    Oxacillin >2  Resistant    Penicillin >8  Resistant    Tetracycline <=4 "><=4  Sensitive    Trimeth/Sulfa <=0.5/9.5 "><=0.5/9.5  Sensitive    Vancomycin 1  Sensitive     7/26/2017     Blood Culture x 2                 NGTD  7/26/2017     Respiratory Culture             Staph Aureus (susceptibilities pending). Few WBC's; Few Gram Positive Cocci     7/27/2017     AFB Culture/Smear              In Process     Antibiotics  Cefepime            2g IVPB q12h                7/25 - present  Ciprofloxacin       400mg IVPB x 1            7/25  Vancomycin        1g (in ED)/750mg qD    7/25 - present  Azithromycin        500mg/250mg qD        7/26 - present      Interval History: Patient was seen and examined at the bedside. Noted to be afebrile overnight. Respiratory culture positive for staph aureus. WBC continues to trend down. AFB culture pending.     Review of Systems  Objective:     Vital Signs (Most Recent):  Temp: 98 °F (36.7 °C) (07/28/17 1200)  Pulse: (!) 114 (07/28/17 1200)  Resp: 20 (07/28/17 1200)  BP: 113/85 (07/28/17 1200)  SpO2: 96 % (07/28/17 1200) Vital Signs (24h Range):  Temp:  [98 °F (36.7 °C)-98.6 °F (37 °C)] 98 °F (36.7 °C)  Pulse:  [] 114  Resp:  [16-20] 20  SpO2:  [95 %-100 %] 96 %  BP: (109-147)/(78-94) 113/85     Weight: 45.4 kg (100 lb)  Body mass index is 14.77 kg/m².    Estimated Creatinine Clearance: 64.6 mL/min (based on Cr of 0.8).    Physical Exam  General: AAOx3. NAD. Conversant.   HEENT: PERRL, EOMI, MMM, Poor dentition  Respiratory: Decreased breath sounds on R. No wheezing appreciated.   Cardiovascular: S1/S2. RRR. No murmurs appreciated.  Abdomen: Soft and non-tender. Normoactive bowel sounds.  Extremities: No LE edema noted. Moves all 4 extremities sponatenously.   Psych: Appropriate mood and " affect    Significant Labs:   CBC:   Recent Labs  Lab 07/27/17  0603 07/28/17  0626   WBC 9.33 5.66   HGB 12.0* 11.6*   HCT 35.6* 34.0*    176     CMP:   Recent Labs  Lab 07/27/17  0603 07/28/17  0626    135*   K 4.1 4.0    103   CO2 22* 26   GLU 69* 71   BUN 13 15   CREATININE 0.8 0.8   CALCIUM 8.7 9.0   PROT 7.0  --    ALBUMIN 2.2*  --    BILITOT 0.8  --    ALKPHOS 76  --    AST 29  --    ALT 30  --    ANIONGAP 8 6*   EGFRNONAA >60 >60     All pertinent labs within the past 24 hours have been reviewed.    Significant Imaging: I have reviewed all pertinent imaging results/findings within the past 24 hours.

## 2017-07-28 NOTE — PROGRESS NOTES
Patient has so far only received 3 doses q 12 hrs. Placed order to repeat vancomycin TROUGH level Before tomorrow morning's dose and then increase the dose to 1 gm q 12 hrs if trough does not increase to >15. Pharmacy will continue to monitor per protocol.

## 2017-07-28 NOTE — ASSESSMENT & PLAN NOTE
Supplemental oxygen dependent  Takes mometasone-formoterol (Dulera) 200-5 mcg BID, albuterol inhaler PRN, albuterol-ipratropium nebulizer PRN.  Fluticasone-vilanterol 200-25 mcg daily in place of Dulera.  Continue nasal cannula.

## 2017-07-28 NOTE — SUBJECTIVE & OBJECTIVE
Interval History: Patient was seen and examined at the bedside. Noted to be afebrile overnight. Respiratory culture positive for staph aureus. WBC continues to trend down. AFB culture pending.     Review of Systems  Objective:     Vital Signs (Most Recent):  Temp: 98 °F (36.7 °C) (07/28/17 1200)  Pulse: (!) 114 (07/28/17 1200)  Resp: 20 (07/28/17 1200)  BP: 113/85 (07/28/17 1200)  SpO2: 96 % (07/28/17 1200) Vital Signs (24h Range):  Temp:  [98 °F (36.7 °C)-98.6 °F (37 °C)] 98 °F (36.7 °C)  Pulse:  [] 114  Resp:  [16-20] 20  SpO2:  [95 %-100 %] 96 %  BP: (109-147)/(78-94) 113/85     Weight: 45.4 kg (100 lb)  Body mass index is 14.77 kg/m².    Estimated Creatinine Clearance: 64.6 mL/min (based on Cr of 0.8).    Physical Exam  General: AAOx3. NAD. Conversant.   HEENT: PERRL, EOMI, MMM, Poor dentition  Respiratory: Decreased breath sounds on R. No wheezing appreciated.   Cardiovascular: S1/S2. RRR. No murmurs appreciated.  Abdomen: Soft and non-tender. Normoactive bowel sounds.  Extremities: No LE edema noted. Moves all 4 extremities sponatenously.   Psych: Appropriate mood and affect    Significant Labs:   CBC:   Recent Labs  Lab 07/27/17  0603 07/28/17  0626   WBC 9.33 5.66   HGB 12.0* 11.6*   HCT 35.6* 34.0*    176     CMP:   Recent Labs  Lab 07/27/17  0603 07/28/17  0626    135*   K 4.1 4.0    103   CO2 22* 26   GLU 69* 71   BUN 13 15   CREATININE 0.8 0.8   CALCIUM 8.7 9.0   PROT 7.0  --    ALBUMIN 2.2*  --    BILITOT 0.8  --    ALKPHOS 76  --    AST 29  --    ALT 30  --    ANIONGAP 8 6*   EGFRNONAA >60 >60     All pertinent labs within the past 24 hours have been reviewed.    Significant Imaging: I have reviewed all pertinent imaging results/findings within the past 24 hours.

## 2017-07-28 NOTE — PT/OT/SLP PROGRESS
"Occupational Therapy  Visit Attempt    Christiano Baer  MRN: [id number]    Patient not seen today secondary to declining therapy at this time -- "I just finished eating and I want to rest". Educated on activity/therapy importance. Asked that therapy return later. Will follow-up later, as time permits.    MADYSON Johnson  7/28/2017  "

## 2017-07-28 NOTE — HOSPITAL COURSE
7/25/2017-7/27/2017 Presented to the ER with complaints of nausea, vomiting, body aches and SOB. Hypotensive and tachycardiac upon presentation with pulse ox 89% and temp 100.1. WBC 22,500. Blood cultures drawn and IV abx initiated. Blood cultures on 7/25/17 with MRSA 4 out of 4. Repeat blood cultures on 7/26/2017 with NGTD. ID consulted with maintenance of IV abx and recommendation for JANET due to unindentified source of infection. 7/28/2017 Cardiology consulted for JANET with plans to proceed on Monday (7/31/2017) 8/1/2017 Underwent JANET yesterday with mass noted to MEHREEN along with enlarged RA & IVC, RV dysfunction and sluggish filling of PA. Images reviewed per main [place] Cardiologist in echo lab with no evidence of valve vegetation and mass felt to be muscle related rather than clot or vegetation related. RA, RV, IVC and PA abnormalities related to PE etiology. HR and BP stable with stable pulse ox on 2LPM NC

## 2017-07-28 NOTE — HPI
57yo male with history of COPD- on home O2, TB, tobacco abuse, chronic hypoxic respiratory failure, PNA, ETOH abuse and pancreatitis who presented to the ER with complaints of n/v, SOB and body ache. He was admitted in late June with PNA to R[facility] and R[facility] and was treated with Avelox and discharge to [facility]. He reports doing well upon discharge until recently when he felt achy with SOB prompting [facility]. He reports feeling better now and currently has no complaints

## 2017-07-28 NOTE — SUBJECTIVE & OBJECTIVE
Past Medical History:   Diagnosis Date    Alcohol abuse 6/23/2017    Alcoholic pancreatitis 02/23/2014    COPD (chronic obstructive pulmonary disease)     ETOH abuse     Hypotension due to drugs 6/25/2017    Tuberculosis 2006       Past Surgical History:   Procedure Laterality Date    CHEST TUBE INSERTION  November 2013       Review of patient's allergies indicates:  No Known Allergies    No current facility-administered medications on file prior to encounter.      Current Outpatient Prescriptions on File Prior to Encounter   Medication Sig    gabapentin (NEURONTIN) 300 MG capsule Take 300 mg by mouth 3 (three) times daily.    lactobacillus acidophilus & bulgar (LACTINEX) 100 million cell packet Take 1 packet (1 each total) by mouth once daily.    mometasone-formoterol (DULERA) 200-5 mcg/actuation inhaler Inhale 2 puffs into the lungs 2 (two) times daily. Controller    albuterol 90 mcg/actuation inhaler Inhale 1-2 puffs into the lungs every 6 (six) hours as needed for Wheezing.     Family History     None        Social History Main Topics    Smoking status: Former Smoker     Packs/day: 1.00     Types: Cigarettes    Smokeless tobacco: Never Used    Alcohol use No      Comment: former alcoholic     Drug use: No    Sexual activity: Not on file     Review of Systems   Constitution: Negative for chills, decreased appetite, diaphoresis, fever and weakness.   Cardiovascular: Negative for chest pain, claudication, cyanosis, dyspnea on exertion, irregular heartbeat, leg swelling, near-syncope, orthopnea, palpitations, paroxysmal nocturnal dyspnea and syncope.   Respiratory: Negative for cough, hemoptysis, shortness of breath and wheezing.    Gastrointestinal: Negative for bloating, abdominal pain, constipation, diarrhea, melena, nausea and vomiting.   Neurological: Negative for dizziness.     Objective:     Vital Signs (Most Recent):  Temp: 98 °F (36.7 °C) (07/28/17 1200)  Pulse: (!) 114 (07/28/17  1200)  Resp: 20 (07/28/17 1200)  BP: 113/85 (07/28/17 1200)  SpO2: 96 % (07/28/17 1200) Vital Signs (24h Range):  Temp:  [98 °F (36.7 °C)-98.6 °F (37 °C)] 98 °F (36.7 °C)  Pulse:  [] 114  Resp:  [16-20] 20  SpO2:  [95 %-100 %] 96 %  BP: (109-147)/(78-94) 113/85     Weight: 45.4 kg (100 lb)  Body mass index is 14.77 kg/m².    SpO2: 96 %  O2 Device (Oxygen Therapy): nasal cannula      Intake/Output Summary (Last 24 hours) at 07/28/17 1348  Last data filed at 07/28/17 0800   Gross per 24 hour   Intake             1305 ml   Output             1600 ml   Net             -295 ml       Lines/Drains/Airways          No matching active lines, drains, or airways          Physical Exam   Constitutional: He is oriented to person, place, and time. He appears well-developed and well-nourished. No distress.   Cardiovascular: Normal rate and regular rhythm.  Exam reveals no gallop.    No murmur heard.  Pulmonary/Chest: Effort normal and breath sounds normal. No respiratory distress. He has no wheezes.   Abdominal: Soft. Bowel sounds are normal. He exhibits no distension. There is no tenderness.   Neurological: He is alert and oriented to person, place, and time.   Skin: Skin is warm and dry.       Significant Labs:       Recent Labs  Lab 07/28/17  0626   *   K 4.0      CO2 26   BUN 15   CREATININE 0.8   MG 1.3*       Recent Labs  Lab 07/28/17  0626   WBC 5.66   RBC 3.66*   HGB 11.6*   HCT 34.0*      MCV 93   MCH 31.7*   MCHC 34.1         Significant Imaging: Echocardiogram:   2D echo with color flow doppler:   Results for orders placed or performed during the hospital encounter of 07/25/17   2D echo with color flow doppler   Result Value Ref Range    EF 55 55 - 65    Diastolic Dysfunction Yes (A)     Est. PA Systolic Pressure 56.72 (A)     Mitral Valve Mobility NORMAL     Tricuspid Valve Regurgitation MILD

## 2017-07-28 NOTE — CONSULTS
[facility]  Cardiology  Consult Note    Patient Name: Christiano Baer  MRN: [id number]  Admission Date: 7/25/2017  Hospital Length of Stay: 3 days  Code Status: Full Code   Attending Provider: Corinna Cox MD   Consulting Provider: JOHNATHON Stevenson ANP  Primary Care Physician: [facility]  Principal Problem:Bacteremia due to methicillin resistant Staphylococcus aureus    Patient information was obtained from patient and past medical records.     Inpatient consult to [facility]  Consult performed by: LIZETH VERGARA  Consult ordered by: CORINNA COX  Reason for consult: MRSA bacteremia; JANET         Subjective:     Chief Complaint:  Nausea/vomiting, fever and body aches      HPI:   55yo male with history of COPD- on home O2, TB, tobacco abuse, chronic hypoxic respiratory failure, PNA, ETOH abuse and pancreatitis who presented to the ER with complaints of n/v, SOB and body ache. He was admitted in late June with PNA to [facility] and [facility] and was treated with Avelox and discharge to [facility] [place] [facility]. He reports doing well upon discharge until recently when he felt achy with SOB prompting [facility]. He reports feeling better now and currently has no complaints     Past Medical History:   Diagnosis Date    Alcohol abuse 6/23/2017    Alcoholic pancreatitis 02/23/2014    COPD (chronic obstructive pulmonary disease)     ETOH abuse     Hypotension due to drugs 6/25/2017    Tuberculosis 2006       Past Surgical History:   Procedure Laterality Date    CHEST TUBE INSERTION  November 2013       Review of patient's allergies indicates:  No Known Allergies    No current facility-administered medications on file prior to encounter.      Current Outpatient Prescriptions on File Prior to Encounter   Medication Sig    gabapentin (NEURONTIN) 300 MG capsule Take 300 mg by mouth 3 (three) times daily.    lactobacillus acidophilus & bulgar (LACTINEX) 100 million cell  packet Take 1 packet (1 each total) by mouth once daily.    mometasone-formoterol (DULERA) 200-5 mcg/actuation inhaler Inhale 2 puffs into the lungs 2 (two) times daily. Controller    albuterol 90 mcg/actuation inhaler Inhale 1-2 puffs into the lungs every 6 (six) hours as needed for Wheezing.     Family History     None        Social History Main Topics    Smoking status: Former Smoker     Packs/day: 1.00     Types: Cigarettes    Smokeless tobacco: Never Used    Alcohol use No      Comment: former alcoholic     Drug use: No    Sexual activity: Not on file     Review of Systems   Constitution: Negative for chills, decreased appetite, diaphoresis, fever and weakness.   Cardiovascular: Negative for chest pain, claudication, cyanosis, dyspnea on exertion, irregular heartbeat, leg swelling, near-syncope, orthopnea, palpitations, paroxysmal nocturnal dyspnea and syncope.   Respiratory: Negative for cough, hemoptysis, shortness of breath and wheezing.    Gastrointestinal: Negative for bloating, abdominal pain, constipation, diarrhea, melena, nausea and vomiting.   Neurological: Negative for dizziness.     Objective:     Vital Signs (Most Recent):  Temp: 98 °F (36.7 °C) (07/28/17 1200)  Pulse: (!) 114 (07/28/17 1200)  Resp: 20 (07/28/17 1200)  BP: 113/85 (07/28/17 1200)  SpO2: 96 % (07/28/17 1200) Vital Signs (24h Range):  Temp:  [98 °F (36.7 °C)-98.6 °F (37 °C)] 98 °F (36.7 °C)  Pulse:  [] 114  Resp:  [16-20] 20  SpO2:  [95 %-100 %] 96 %  BP: (109-147)/(78-94) 113/85     Weight: 45.4 kg (100 lb)  Body mass index is 14.77 kg/m².    SpO2: 96 %  O2 Device (Oxygen Therapy): nasal cannula      Intake/Output Summary (Last 24 hours) at 07/28/17 1348  Last data filed at 07/28/17 0800   Gross per 24 hour   Intake             1305 ml   Output             1600 ml   Net             -295 ml       Lines/Drains/Airways          No matching active lines, drains, or airways          Physical Exam   Constitutional: He is  oriented to person, place, and time. He appears well-developed and well-nourished. No distress.   Cardiovascular: Normal rate and regular rhythm.  Exam reveals no gallop.    No murmur heard.  Pulmonary/Chest: Effort normal and breath sounds normal. No respiratory distress. He has no wheezes.   Abdominal: Soft. Bowel sounds are normal. He exhibits no distension. There is no tenderness.   Neurological: He is alert and oriented to person, place, and time.   Skin: Skin is warm and dry.       Significant Labs:       Recent Labs  Lab 07/28/17 0626   *   K 4.0      CO2 26   BUN 15   CREATININE 0.8   MG 1.3*       Recent Labs  Lab 07/28/17 0626   WBC 5.66   RBC 3.66*   HGB 11.6*   HCT 34.0*      MCV 93   MCH 31.7*   MCHC 34.1         Significant Imaging: Echocardiogram:   2D echo with color flow doppler:   Results for orders placed or performed during the hospital encounter of 07/25/17   2D echo with color flow doppler   Result Value Ref Range    EF 55 55 - 65    Diastolic Dysfunction Yes (A)     Est. PA Systolic Pressure 56.72 (A)     Mitral Valve Mobility NORMAL     Tricuspid Valve Regurgitation MILD      Assessment and Plan:     * Bacteremia due to methicillin resistant Staphylococcus aureus    Presented with sepsis with hypotension, tachycardia and fever; blood cultures with MRSA on 7/25/2017; started on IV abx with repeat blood cultures NGTD; ID consulted; no identified source of bacteremia therefore JANET indicated to rule out endocarditis; will plan to proceed with JANET on Monday for further evaluation; further recs to follow once JANET completed             VTE Risk Mitigation         Ordered     Medium Risk of VTE  Once      07/25/17 0727     Place sequential compression device  Until discontinued      07/25/17 0727     Place MARSHA hose  Until discontinued      07/25/17 0727          Thank you for your consult. I will follow-up with patient. Please contact us if you have any additional  questions.    JOHNATHON Stevenson, ANP  Cardiology   [facility]

## 2017-07-29 PROBLEM — J44.1 COPD EXACERBATION: Status: RESOLVED | Noted: 2017-06-23 | Resolved: 2017-07-29

## 2017-07-29 PROBLEM — I26.99 RIGHT PULMONARY EMBOLUS: Status: ACTIVE | Noted: 2017-07-29

## 2017-07-29 LAB
BACTERIA SPEC AEROBE CULT: NORMAL
GRAM STN SPEC: NORMAL
VANCOMYCIN TROUGH SERPL-MCNC: 8.7 UG/ML

## 2017-07-29 PROCEDURE — 99900035 HC TECH TIME PER 15 MIN (STAT)

## 2017-07-29 PROCEDURE — 94761 N-INVAS EAR/PLS OXIMETRY MLT: CPT

## 2017-07-29 PROCEDURE — 25500020 PHARM REV CODE 255: Performed by: HOSPITALIST

## 2017-07-29 PROCEDURE — 27000221 HC OXYGEN, UP TO 24 HOURS

## 2017-07-29 PROCEDURE — 94640 AIRWAY INHALATION TREATMENT: CPT

## 2017-07-29 PROCEDURE — 80202 ASSAY OF VANCOMYCIN: CPT

## 2017-07-29 PROCEDURE — 25000003 PHARM REV CODE 250: Performed by: INTERNAL MEDICINE

## 2017-07-29 PROCEDURE — 25000003 PHARM REV CODE 250: Performed by: NURSE PRACTITIONER

## 2017-07-29 PROCEDURE — 25000003 PHARM REV CODE 250: Performed by: PHYSICIAN ASSISTANT

## 2017-07-29 PROCEDURE — 87116 MYCOBACTERIA CULTURE: CPT

## 2017-07-29 PROCEDURE — 25000242 PHARM REV CODE 250 ALT 637 W/ HCPCS: Performed by: PHYSICIAN ASSISTANT

## 2017-07-29 PROCEDURE — 11000001 HC ACUTE MED/SURG PRIVATE ROOM

## 2017-07-29 PROCEDURE — 63600175 PHARM REV CODE 636 W HCPCS: Performed by: INTERNAL MEDICINE

## 2017-07-29 PROCEDURE — 25000003 PHARM REV CODE 250: Performed by: STUDENT IN AN ORGANIZED HEALTH CARE EDUCATION/TRAINING PROGRAM

## 2017-07-29 PROCEDURE — 63600175 PHARM REV CODE 636 W HCPCS: Performed by: HOSPITALIST

## 2017-07-29 PROCEDURE — 36415 COLL VENOUS BLD VENIPUNCTURE: CPT

## 2017-07-29 PROCEDURE — 87015 SPECIMEN INFECT AGNT CONCNTJ: CPT

## 2017-07-29 PROCEDURE — 25000003 PHARM REV CODE 250: Performed by: HOSPITALIST

## 2017-07-29 RX ORDER — ENOXAPARIN SODIUM 100 MG/ML
1 INJECTION SUBCUTANEOUS EVERY 12 HOURS
Status: DISCONTINUED | OUTPATIENT
Start: 2017-07-29 | End: 2017-08-01

## 2017-07-29 RX ADMIN — FLUTICASONE FUROATE AND VILANTEROL TRIFENATATE 1 PUFF: 200; 25 POWDER RESPIRATORY (INHALATION) at 11:07

## 2017-07-29 RX ADMIN — ACETAMINOPHEN 650 MG: 325 TABLET ORAL at 08:07

## 2017-07-29 RX ADMIN — ACETAMINOPHEN 650 MG: 325 TABLET ORAL at 04:07

## 2017-07-29 RX ADMIN — IOHEXOL 75 ML: 350 INJECTION, SOLUTION INTRAVENOUS at 10:07

## 2017-07-29 RX ADMIN — GABAPENTIN 300 MG: 300 CAPSULE ORAL at 08:07

## 2017-07-29 RX ADMIN — VANCOMYCIN HYDROCHLORIDE 750 MG: 750 INJECTION, POWDER, LYOPHILIZED, FOR SOLUTION INTRAVENOUS at 05:07

## 2017-07-29 RX ADMIN — MAGNESIUM OXIDE TAB 400 MG (241.3 MG ELEMENTAL MG) 400 MG: 400 (241.3 MG) TAB at 08:07

## 2017-07-29 RX ADMIN — IPRATROPIUM BROMIDE AND ALBUTEROL SULFATE 3 ML: 2.5; .5 SOLUTION RESPIRATORY (INHALATION) at 07:07

## 2017-07-29 RX ADMIN — VANCOMYCIN HYDROCHLORIDE 1000 MG: 1 INJECTION, POWDER, LYOPHILIZED, FOR SOLUTION INTRAVENOUS at 04:07

## 2017-07-29 RX ADMIN — IPRATROPIUM BROMIDE AND ALBUTEROL SULFATE 3 ML: 2.5; .5 SOLUTION RESPIRATORY (INHALATION) at 11:07

## 2017-07-29 RX ADMIN — ENOXAPARIN SODIUM 50 MG: 100 INJECTION SUBCUTANEOUS at 12:07

## 2017-07-29 RX ADMIN — ENOXAPARIN SODIUM 50 MG: 100 INJECTION SUBCUTANEOUS at 08:07

## 2017-07-29 RX ADMIN — ACETAMINOPHEN 650 MG: 325 TABLET ORAL at 11:07

## 2017-07-29 NOTE — SUBJECTIVE & OBJECTIVE
Interval History: No acute events.    Review of Systems   Constitutional: Negative for chills and fever.   Respiratory: Negative for shortness of breath.    Cardiovascular: Negative for chest pain and leg swelling.     Objective:     Vital Signs (Most Recent):  Temp: 97.8 °F (36.6 °C) (07/29/17 0425)  Pulse: 105 (07/29/17 0751)  Resp: 18 (07/29/17 0751)  BP: 124/88 (07/29/17 0425)  SpO2: (!) 92 % (07/29/17 0751) Vital Signs (24h Range):  Temp:  [97.5 °F (36.4 °C)-98.1 °F (36.7 °C)] 97.8 °F (36.6 °C)  Pulse:  [] 105  Resp:  [18-22] 18  SpO2:  [90 %-96 %] 92 %  BP: (113-137)/(64-92) 124/88     Weight: 45.4 kg (100 lb)  Body mass index is 14.77 kg/m².    Intake/Output Summary (Last 24 hours) at 07/29/17 0832  Last data filed at 07/28/17 2200   Gross per 24 hour   Intake              375 ml   Output             1000 ml   Net             -625 ml      Physical Exam   Constitutional: He is oriented to person, place, and time. No distress.   Cardiovascular: Normal rate and regular rhythm.    Pulmonary/Chest: Effort normal and breath sounds normal. No respiratory distress. He has no wheezes.   Musculoskeletal: He exhibits no edema.   Neurological: He is alert and oriented to person, place, and time.   Psychiatric: He has a normal mood and affect.   Nursing note and vitals reviewed.      Significant Labs:   BMP:     Recent Labs  Lab 07/28/17 0626   GLU 71   *   K 4.0      CO2 26   BUN 15   CREATININE 0.8   CALCIUM 9.0   MG 1.3*     CBC:     Recent Labs  Lab 07/28/17 0626   WBC 5.66   HGB 11.6*   HCT 34.0*        Significant Imaging: None new

## 2017-07-29 NOTE — ASSESSMENT & PLAN NOTE
Started therapeutic enoxaparin on 7/29/17, and can transition to novel oral anticoagulant on discharge.

## 2017-07-29 NOTE — PROGRESS NOTES
[facility] Hospital Medicine  Progress Note    Patient Name: Christiano Baer  MRN: [id number]  Patient Class: IP- Inpatient   Admission Date: 7/25/2017  Length of Stay: 4 days  Attending Physician: Kenan Cox MD  Primary Care Provider: [facility]        Subjective:     Principal Problem:Bacteremia due to methicillin resistant Staphylococcus aureus    HPI:  Christiano Baer is a 56 y.o. black man with bullous emphysema due to cigarette smoking (stopped smoking on 6/23/17), chronic hypoxic respiratory failure (on 2 liters nasal cannula), history of tuberculosis, history of alcohol abuse with alcoholic pancreatitis on 2/23/14.   He lives in [place], [place].     He was hospitalized at [facility] from 6/23/17 to 6/26/17 for right middle and lower lung pneumonia treated with moxifloxacin.  He was hypotensive on amlodipine, hydrochlorothiazide, and valsartan, which were discontinued at that time.  He was discharged to [facility] skilled nursing facility.       He returned to [facility] on 7/25/17 with nausea, shortness of breath, cough, pleuritic chest pain worsened by coughing, headache, and generalized weakness.  Initially his oxygen saturation was as low as 89% on 3 liters nasal cannula.  He was given albuterol-ipratropium nebulizer treatment and oxygen saturation improved to high 90s on 2 liters.  He was also tachycardic (pulse up to 140) and hypotensive (blood pressure as low as 83/54), which improved after being given IV fluids.  Temperature was 100.1 F.  WBC count was 22,530.   Procalcitonin elevated at 18. Total bilirubin elevated at 2.5.  BNP elevated at 425.  Chest x-ray showed increased lateral right pleural thickening. Blood cultures were collected and he was given cefepime, vancomycin, and ciprofloxacin.   He was admitted to [facility].   He complained of leg pain and fatigue.  He is a poor  historian and appeared anxious during on admission.     Hospital Course:  WBC count was 15,870 when he was discharged on 6/26/17, 22,530 on admission, and decreased to normal by 7/27/17 on cefepime and vancomycin.  Blood cultures on admission grew methicillin-resistant Staphylococcus aureus.  Sputum culture also grew it.  Infectious Disease was consulted.  They added azithromycin for his pneumonia and investigated possible recurrence of tuberculosis.  Cefepime and azithromycin were stopped once sputum culture results were reported on 7/28/17.  Infectious Disease also recommended JANET after a TTE showed no vegetation.  Cardiology discussed this with him and he agreed.  Infectious Disease also recommended chest CT.  Contrast was added for better diagnostic value in case he had malignancy due to smoking history.  Contrast chest CT on 7/29/17 incidentally showed right pulmonary embolism.    Interval History: No acute events.    Review of Systems   Constitutional: Negative for chills and fever.   Respiratory: Negative for shortness of breath.    Cardiovascular: Negative for chest pain and leg swelling.     Objective:     Vital Signs (Most Recent):  Temp: 97.8 °F (36.6 °C) (07/29/17 0425)  Pulse: 105 (07/29/17 0751)  Resp: 18 (07/29/17 0751)  BP: 124/88 (07/29/17 0425)  SpO2: (!) 92 % (07/29/17 0751) Vital Signs (24h Range):  Temp:  [97.5 °F (36.4 °C)-98.1 °F (36.7 °C)] 97.8 °F (36.6 °C)  Pulse:  [] 105  Resp:  [18-22] 18  SpO2:  [90 %-96 %] 92 %  BP: (113-137)/(64-92) 124/88     Weight: 45.4 kg (100 lb)  Body mass index is 14.77 kg/m².    Intake/Output Summary (Last 24 hours) at 07/29/17 0832  Last data filed at 07/28/17 2200   Gross per 24 hour   Intake              375 ml   Output             1000 ml   Net             -625 ml      Physical Exam   Constitutional: He is oriented to person, place, and time. No distress.   Cardiovascular: Normal rate and regular rhythm.    Pulmonary/Chest: Effort normal and breath  sounds normal. No respiratory distress. He has no wheezes.   Musculoskeletal: He exhibits no edema.   Neurological: He is alert and oriented to person, place, and time.   Psychiatric: He has a normal mood and affect.   Nursing note and vitals reviewed.      Significant Labs:   BMP:     Recent Labs  Lab 07/28/17 0626   GLU 71   *   K 4.0      CO2 26   BUN 15   CREATININE 0.8   CALCIUM 9.0   MG 1.3*     CBC:     Recent Labs  Lab 07/28/17 0626   WBC 5.66   HGB 11.6*   HCT 34.0*        Significant Imaging: None new    Assessment/Plan:      * Bacteremia due to methicillin resistant Staphylococcus aureus    Pneumonia of right lung due to methicillin resistant Staphylococcus aureus (MRSA)  Continue vancomycin.  JANET Monday.  CT chest per ID recommendation.  Use contrast to differentiate tumor if present due to longtime smoking.          Right pulmonary embolus    Start therapeutic enoxaparin, and can transition to novel oral anticoagulant on discharge.          History of TB (tuberculosis)    Airborne isolation.  Collecting sputum samples for AFB per ID recommendations.        Chronic bullous emphysema    Supplemental oxygen dependent  Takes mometasone-formoterol (Dulera) 200-5 mcg BID, albuterol inhaler PRN, albuterol-ipratropium nebulizer PRN.  Fluticasone-vilanterol 200-25 mcg daily in place of Dulera.  Continue nasal cannula.          Diastolic dysfunction    Pulmonary hypotension  Monitor for fluid overload.          Former smoker    Stopped after last hospitalization on 6/23/17.            VTE Risk Mitigation         Ordered     Medium Risk of VTE  Once      07/25/17 0727     Place sequential compression device  Until discontinued      07/25/17 0727     Place MARSHA hose  Until discontinued      07/25/17 0727        Disposition: Pending JANET Monday then final antibiotic recommendations.  Will return to [facility].    Kenan Cox MD  Department of Hospital Medicine   [facility]

## 2017-07-29 NOTE — PROGRESS NOTES
[facility]  Infectious Disease  Progress Note    Patient Name: Christiano Baer  MRN: [id number]  Admission Date: 7/25/2017  Length of Stay: 4 days  Attending Physician: Kenan Cox MD  Primary Care Provider: [facility]    Isolation Status: Contact and Airborne  Assessment/Plan:      * Bacteremia due to methicillin resistant Staphylococcus aureus    -Patient with recent hospitalization from 6/23/2017 - 6/27/2017 for sepsis 2/2 to CAP. Treated with 7-day course of moxifloxacin.   -Blood Culture during last admission were NGTD; Respiratory Culture grew streptococcus group F.   -Presented with cough, SOB, pleuritic chest pain.   -Imaging revealing marked architectural distortion/scarring in the RIGHT hemithorax similar to prior except for increased pleural thickening laterally. LEFT lung is hyperinflated with emphysematous change but clear acute disease.   -Febrile upon admission, WBC this AM 17.69, Pro-deandre elevated at 18.   -Blood Cultures from 7/25 revealing MRSA.   -Repeat Blood culture from 7/26 revealing NGTD.    -De-escalated antibiotics per primary and now only on vancomycin - agree with de-escalation.   -Unclear etiology of bacteremia at this time, patient denies IVDU and has no chronic lines or skin breakdown. Etiology likely could be Staph Pneumonia vs Recurrence of TB. -Repeat ECHO revealing no evidence of pericardial effusion, intracavity mass, thrombi, or vegetation. Plan for JANET on 7/31.   -Vanc Trough 8.7 on 7/29. Have increased vancomycin dose to 1g q12h.   -Recommend continuing current antibiotic therapy at this time and rule out TB. Patient now on isolation. First AFB stain negative for AFB culture pending. Spoke with nursing today to obtain 2nd AFB today, 3rd tomorrow.   -Recommend CT Chest to look for empyema or lung abscess.             Anticipated Disposition: Per primary team.     Thank you for your consult. I will follow-up with patient. Please contact us if  you have any additional questions.    Willy Ruiz, DO  Infectious Disease  [facility]    Subjective:     Principal Problem:Bacteremia due to methicillin resistant Staphylococcus aureus    HPI:   Patient is a 57 y/o AA M with a PMH significant for COPD on 2L NC, hx of TB (treated in 2003), and hx of alcohol abuse with pancreatitis in 2014. Patient was a poor historian upon examination and history was obtained per chart review. Patient was recently hospitalized at [facility] from 6/23/2017 - 6/26/2017 for sepsis with hypotension secondary to CAP. During that admission, he was treated with Vancomcyin/Cefepime/Flagyl/and Ciprofloxacin initially. Blood cultures during that admission revealed no growth however respiratory cultures revealed Streptococcus Group F. Mr. Delacruz subsequently improved clinically and his antibiotics were de-escalated to moxifloxacin. He was subsequently discharged on a 7 day course of moxifloxacin. On 07/25/2017 the patient presented back to [facility] with complaints of cough, SOB, and pleuritic chest pain. Patient was found to be tachycardic and hypotensive and he was treated for sepsis. CXR revealed marked architectural distortion/scarring in the RIGHT hemithorax and hyperinflated L lung with emphysematous change but clear of acute disease. WBC was 22.52, Pro-Sadi is elevated at 18, and the patient has been febrile with a Tmax of 100.6 in the past 24 hours. Patient was started on vancomycin, ciprofloxacin, and cefepime. Subsequent blood cultures from admission have grown staphylococcus aureus. Upon examination patient endorses productive cough, pleuritic chest pain, and SOB.      Microbiology Data  6/23/2017     Blood Cultures x 2       NGTD  6/23/2017     Urine Culture               No growth  6/23/2017     Respiratory Culture     Streptococcus Group F. Gram Stain with many gram positive cocci, many gram negative rods, moderate gram negative diplococci.  7/25/2017    "  Blood Culture x 2         MRSA  Susceptibility    Methicillin resistant staphylococcus aureus    CULTURE, BLOOD    Clindamycin <=0.5 "><=0.5  Sensitive    Erythromycin <=0.5 "><=0.5  Sensitive    Oxacillin >2  Resistant    Penicillin >8  Resistant    Tetracycline <=4 "><=4  Sensitive    Trimeth/Sulfa <=0.5/9.5 "><=0.5/9.5  Sensitive    Vancomycin 1  Sensitive     7/26/2017     Blood Culture x 2                 NGTD  7/26/2017     Respiratory Culture             Staph Aureus (susceptibilities pending). Few WBC's; Few Gram Positive Cocci     7/27/2017     AFB Culture/Smear              No AFB seen on stain. Culture In Process     Antibiotics  Cefepime            2g IVPB q12h                7/25 - present  Ciprofloxacin       400mg IVPB x 1            7/25  Vancomycin        1g (in ED)/750mg qD    7/25 - present  Azithromycin        500mg/250mg qD        7/26 - present        Interval History: Patient was seen and examined at the bedside. Noted to be afebrile overnight. Blood Cultures revealing NGTD. AFB culture pending. Will obtain AFB culture today and tomorrow.     Review of Systems  Objective:     Vital Signs (Most Recent):  Temp: 98.3 °F (36.8 °C) (07/29/17 0837)  Pulse: (!) 114 (07/29/17 0837)  Resp: 19 (07/29/17 0837)  BP: 104/73 (07/29/17 0837)  SpO2: (!) 92 % (07/29/17 0751) Vital Signs (24h Range):  Temp:  [97.5 °F (36.4 °C)-98.3 °F (36.8 °C)] 98.3 °F (36.8 °C)  Pulse:  [] 114  Resp:  [18-22] 19  SpO2:  [90 %-96 %] 92 %  BP: (104-137)/(64-92) 104/73     Weight: 45.4 kg (100 lb)  Body mass index is 14.77 kg/m².    Estimated Creatinine Clearance: 64.6 mL/min (based on Cr of 0.8).    Physical Exam  General: AAOx3. NAD. Conversant.   HEENT: PERRL, EOMI, MMM, Poor dentition  Respiratory: Decreased breath sounds on R. No wheezing appreciated.   Cardiovascular: S1/S2. RRR. No murmurs appreciated.  Abdomen: Soft and non-tender. Normoactive bowel sounds.  Extremities: No LE edema noted. Moves all 4 extremities " sponatenously.   Psych: Appropriate mood and affect    Significant Labs:   Blood Culture:   Recent Labs  Lab 06/23/17  1357 06/23/17  2155 07/25/17  0249 07/25/17  0250 07/26/17  1040   LABBLOO No growth after 5 days. No growth after 5 days.  No growth after 5 days. Gram stain aer bottle: Gram positive cocci in clusters resembling Staph  Results called to and read back by:Philomena Cloud RN 07/25/2017  21:01  METHICILLIN RESISTANT STAPHYLOCOCCUS AUREUSID consult required at [facility]. Gram stain jennifer bottle: Gram positive cocci in clusters resembling Staph   Results called to and read back by:Mary Licona RN 07/26/2017  10:44  METHICILLIN RESISTANT STAPHYLOCOCCUS AUREUSID consult required at [facility].For susceptibility see order #[id number] No Growth to date  No Growth to date  No Growth to date  No Growth to date  No Growth to date  No Growth to date     CBC:   Recent Labs  Lab 07/28/17  0626   WBC 5.66   HGB 11.6*   HCT 34.0*        CMP:   Recent Labs  Lab 07/28/17  0626   *   K 4.0      CO2 26   GLU 71   BUN 15   CREATININE 0.8   CALCIUM 9.0   ANIONGAP 6*   EGFRNONAA >60     Respiratory Culture:   Recent Labs  Lab 06/24/17  0521 07/26/17  1436   GSRESP <10 epithelial cells per low power field.  Rare WBC's  Many Gram positive cocci  Many Gram negative rods  Moderate Gram negative diplococci <10 epithelial cells per low power field.  Few WBC's  Few Gram positive cocci   RESPIRATORYC STREPTOCOCCUS GROUP FFewNormal respiratory ignacio also present STAPHYLOCOCCUS AUREUSManySusceptibility pendingNormal respiratory ignacio also present       Significant Imaging: I have reviewed all pertinent imaging results/findings within the past 24 hours.

## 2017-07-29 NOTE — PLAN OF CARE
Problem: Patient Care Overview  Goal: Plan of Care Review  Patient tolerated therapy well.  Will continue to monitor.

## 2017-07-29 NOTE — ASSESSMENT & PLAN NOTE
-Patient with recent hospitalization from 6/23/2017 - 6/27/2017 for sepsis 2/2 to CAP. Treated with 7-day course of moxifloxacin.   -Blood Culture during last admission were NGTD; Respiratory Culture grew streptococcus group F.   -Presented with cough, SOB, pleuritic chest pain.   -Imaging revealing marked architectural distortion/scarring in the RIGHT hemithorax similar to prior except for increased pleural thickening laterally. LEFT lung is hyperinflated with emphysematous change but clear acute disease.   -Febrile upon admission, WBC this AM 17.69, Pro-deandre elevated at 18.   -Blood Cultures from 7/25 revealing MRSA.   -Repeat Blood culture from 7/26 revealing NGTD.    -De-escalated antibiotics per primary and now only on vancomycin - agree with de-escalation.   -Unclear etiology of bacteremia at this time, patient denies IVDU and has no chronic lines or skin breakdown. Etiology likely could be Staph Pneumonia vs Recurrence of TB. -Repeat ECHO revealing no evidence of pericardial effusion, intracavity mass, thrombi, or vegetation. Plan for JANET on 7/31.   -Vanc Trough 8.7 on 7/29. Have increased vancomycin dose to 1g q12h.   -Recommend continuing current antibiotic therapy at this time and rule out TB. Patient now on isolation. First AFB stain negative for AFB culture pending. Spoke with nursing today to obtain 2nd AFB today, 3rd tomorrow.   -Recommend CT Chest to look for empyema or lung abscess.

## 2017-07-29 NOTE — SUBJECTIVE & OBJECTIVE
Interval History: Patient was seen and examined at the bedside. Noted to be afebrile overnight. Blood Cultures revealing NGTD. AFB culture pending. Will obtain AFB culture today and tomorrow.     Review of Systems  Objective:     Vital Signs (Most Recent):  Temp: 98.3 °F (36.8 °C) (07/29/17 0837)  Pulse: (!) 114 (07/29/17 0837)  Resp: 19 (07/29/17 0837)  BP: 104/73 (07/29/17 0837)  SpO2: (!) 92 % (07/29/17 0751) Vital Signs (24h Range):  Temp:  [97.5 °F (36.4 °C)-98.3 °F (36.8 °C)] 98.3 °F (36.8 °C)  Pulse:  [] 114  Resp:  [18-22] 19  SpO2:  [90 %-96 %] 92 %  BP: (104-137)/(64-92) 104/73     Weight: 45.4 kg (100 lb)  Body mass index is 14.77 kg/m².    Estimated Creatinine Clearance: 64.6 mL/min (based on Cr of 0.8).    Physical Exam  General: AAOx3. NAD. Conversant.   HEENT: PERRL, EOMI, MMM, Poor dentition  Respiratory: Decreased breath sounds on R. No wheezing appreciated.   Cardiovascular: S1/S2. RRR. No murmurs appreciated.  Abdomen: Soft and non-tender. Normoactive bowel sounds.  Extremities: No LE edema noted. Moves all 4 extremities sponatenously.   Psych: Appropriate mood and affect    Significant Labs:   Blood Culture:   Recent Labs  Lab 06/23/17  1357 06/23/17  2155 07/25/17  0249 07/25/17  0250 07/26/17  1040   LABBLOO No growth after 5 days. No growth after 5 days.  No growth after 5 days. Gram stain aer bottle: Gram positive cocci in clusters resembling Staph  Results called to and read back by:Philomena Cloud RN 07/25/2017  21:01  METHICILLIN RESISTANT STAPHYLOCOCCUS AUREUSID consult required at [facility].Critical access hospital and [facility]. Gram stain jennifer bottle: Gram positive cocci in clusters resembling Staph   Results called to and read back by:Mary Licona RN 07/26/2017  10:44  METHICILLIN RESISTANT STAPHYLOCOCCUS AUREUSID consult required at [facility].Critical access hospital and [facility].For susceptibility see order #[id number] No Growth to date  No Growth to date  No Growth to date  No Growth to  date  No Growth to date  No Growth to date     CBC:   Recent Labs  Lab 07/28/17  0626   WBC 5.66   HGB 11.6*   HCT 34.0*        CMP:   Recent Labs  Lab 07/28/17  0626   *   K 4.0      CO2 26   GLU 71   BUN 15   CREATININE 0.8   CALCIUM 9.0   ANIONGAP 6*   EGFRNONAA >60     Respiratory Culture:   Recent Labs  Lab 06/24/17  0521 07/26/17  1436   GSRESP <10 epithelial cells per low power field.  Rare WBC's  Many Gram positive cocci  Many Gram negative rods  Moderate Gram negative diplococci <10 epithelial cells per low power field.  Few WBC's  Few Gram positive cocci   RESPIRATORYC STREPTOCOCCUS GROUP FFewNormal respiratory ignacio also present STAPHYLOCOCCUS AUREUSManySusceptibility pendingNormal respiratory ignacio also present       Significant Imaging: I have reviewed all pertinent imaging results/findings within the past 24 hours.

## 2017-07-29 NOTE — ASSESSMENT & PLAN NOTE
Pneumonia of right lung due to methicillin resistant Staphylococcus aureus (MRSA)  Continue vancomycin.  JANET Monday.  CT chest per ID recommendation.  Use contrast to differentiate tumor if present due to longtime smoking.

## 2017-07-30 ENCOUNTER — ANESTHESIA EVENT (OUTPATIENT)
Dept: SURGERY | Facility: HOSPITAL | Age: 57
DRG: 871 | End: 2017-07-30
Payer: MEDICARE

## 2017-07-30 PROBLEM — R77.8 ELEVATED TOTAL PROTEIN: Status: RESOLVED | Noted: 2017-06-23 | Resolved: 2017-07-30

## 2017-07-30 LAB — VANCOMYCIN SERPL-MCNC: 15.3 UG/ML

## 2017-07-30 PROCEDURE — 99233 SBSQ HOSP IP/OBS HIGH 50: CPT | Mod: ,,, | Performed by: INTERNAL MEDICINE

## 2017-07-30 PROCEDURE — 25000003 PHARM REV CODE 250: Performed by: PHYSICIAN ASSISTANT

## 2017-07-30 PROCEDURE — 25000003 PHARM REV CODE 250: Performed by: INTERNAL MEDICINE

## 2017-07-30 PROCEDURE — 94640 AIRWAY INHALATION TREATMENT: CPT

## 2017-07-30 PROCEDURE — 63600175 PHARM REV CODE 636 W HCPCS: Performed by: HOSPITALIST

## 2017-07-30 PROCEDURE — 94761 N-INVAS EAR/PLS OXIMETRY MLT: CPT

## 2017-07-30 PROCEDURE — 99900035 HC TECH TIME PER 15 MIN (STAT)

## 2017-07-30 PROCEDURE — 11000001 HC ACUTE MED/SURG PRIVATE ROOM

## 2017-07-30 PROCEDURE — 25000003 PHARM REV CODE 250: Performed by: NURSE PRACTITIONER

## 2017-07-30 PROCEDURE — 80202 ASSAY OF VANCOMYCIN: CPT

## 2017-07-30 PROCEDURE — 25000003 PHARM REV CODE 250: Performed by: HOSPITALIST

## 2017-07-30 PROCEDURE — 25000242 PHARM REV CODE 250 ALT 637 W/ HCPCS: Performed by: PHYSICIAN ASSISTANT

## 2017-07-30 PROCEDURE — 63600175 PHARM REV CODE 636 W HCPCS: Performed by: INTERNAL MEDICINE

## 2017-07-30 PROCEDURE — 36415 COLL VENOUS BLD VENIPUNCTURE: CPT

## 2017-07-30 PROCEDURE — 27000221 HC OXYGEN, UP TO 24 HOURS

## 2017-07-30 RX ADMIN — ENOXAPARIN SODIUM 50 MG: 100 INJECTION SUBCUTANEOUS at 09:07

## 2017-07-30 RX ADMIN — IPRATROPIUM BROMIDE AND ALBUTEROL SULFATE 3 ML: 2.5; .5 SOLUTION RESPIRATORY (INHALATION) at 07:07

## 2017-07-30 RX ADMIN — VANCOMYCIN HYDROCHLORIDE 1000 MG: 1 INJECTION, POWDER, LYOPHILIZED, FOR SOLUTION INTRAVENOUS at 05:07

## 2017-07-30 RX ADMIN — GABAPENTIN 300 MG: 300 CAPSULE ORAL at 10:07

## 2017-07-30 RX ADMIN — ACETAMINOPHEN 650 MG: 325 TABLET ORAL at 07:07

## 2017-07-30 RX ADMIN — MAGNESIUM OXIDE TAB 400 MG (241.3 MG ELEMENTAL MG) 400 MG: 400 (241.3 MG) TAB at 10:07

## 2017-07-30 RX ADMIN — ACETAMINOPHEN 650 MG: 325 TABLET ORAL at 10:07

## 2017-07-30 RX ADMIN — IPRATROPIUM BROMIDE AND ALBUTEROL SULFATE 3 ML: 2.5; .5 SOLUTION RESPIRATORY (INHALATION) at 03:07

## 2017-07-30 RX ADMIN — IPRATROPIUM BROMIDE AND ALBUTEROL SULFATE 3 ML: 2.5; .5 SOLUTION RESPIRATORY (INHALATION) at 08:07

## 2017-07-30 RX ADMIN — FLUTICASONE FUROATE AND VILANTEROL TRIFENATATE 1 PUFF: 200; 25 POWDER RESPIRATORY (INHALATION) at 08:07

## 2017-07-30 RX ADMIN — GABAPENTIN 300 MG: 300 CAPSULE ORAL at 09:07

## 2017-07-30 RX ADMIN — ENOXAPARIN SODIUM 50 MG: 100 INJECTION SUBCUTANEOUS at 10:07

## 2017-07-30 NOTE — PROGRESS NOTES
[facility] Hospital Medicine  Progress Note    Patient Name: Christiano Baer  MRN: [id number]  Patient Class: IP- Inpatient   Admission Date: 7/25/2017  Length of Stay: 5 days  Attending Physician: Kenan Cox MD  Primary Care Provider: [facility]        Subjective:     Principal Problem:Bacteremia due to methicillin resistant Staphylococcus aureus    HPI:  Christiano Baer is a 56 y.o. black man with bullous emphysema due to cigarette smoking (stopped smoking on 6/23/17), chronic hypoxic respiratory failure (on 2 liters nasal cannula), history of tuberculosis, history of alcohol abuse with alcoholic pancreatitis on 2/23/14.   He lives in [place], [place].     He was hospitalized at [facility] from 6/23/17 to 6/26/17 for right middle and lower lung pneumonia treated with moxifloxacin.  He was hypotensive on amlodipine, hydrochlorothiazide, and valsartan, which were discontinued at that time.  He was discharged to [facility] skilled nursing facility.       He returned to [facility] on 7/25/17 with nausea, shortness of breath, cough, pleuritic chest pain worsened by coughing, headache, and generalized weakness.  Initially his oxygen saturation was as low as 89% on 3 liters nasal cannula.  He was given albuterol-ipratropium nebulizer treatment and oxygen saturation improved to high 90s on 2 liters.  He was also tachycardic (pulse up to 140) and hypotensive (blood pressure as low as 83/54), which improved after being given IV fluids.  Temperature was 100.1 F.  WBC count was 22,530.   Procalcitonin elevated at 18. Total bilirubin elevated at 2.5.  BNP elevated at 425.  Chest x-ray showed increased lateral right pleural thickening. Blood cultures were collected and he was given cefepime, vancomycin, and ciprofloxacin.   He was admitted to [facility].   He complained of leg pain and fatigue.  He is a poor  historian and appeared anxious during on admission.     Hospital Course:  WBC count was 15,870 when he was discharged on 6/26/17, 22,530 on admission, and decreased to normal by 7/27/17 on cefepime and vancomycin.  Blood cultures on admission grew methicillin-resistant Staphylococcus aureus.  Sputum culture also grew it.  Infectious Disease was consulted.  They added azithromycin for his pneumonia and investigated possible recurrence of tuberculosis.  Cefepime and azithromycin were stopped once sputum culture results were reported on 7/28/17.  Infectious Disease also recommended JANET after a TTE showed no vegetation.  Cardiology discussed this with him and he agreed.  Infectious Disease also recommended chest CT.  Contrast was added for better diagnostic value in case he had malignancy due to smoking history.  Contrast chest CT on 7/29/17 incidentally showed right pulmonary embolism, so anticoagulation was started.      Interval History: Consent for JANET obtained by Cardiology today.    Review of Systems   Constitutional: Negative for chills and fever.   Respiratory: Negative for shortness of breath.    Cardiovascular: Negative for chest pain and leg swelling.     Objective:     Vital Signs (Most Recent):  Temp: 98.8 °F (37.1 °C) (07/30/17 0818)  Pulse: 99 (07/30/17 0818)  Resp: 18 (07/30/17 0818)  BP: 109/74 (07/30/17 0818)  SpO2: 96 % (07/30/17 0808) Vital Signs (24h Range):  Temp:  [97.6 °F (36.4 °C)-98.8 °F (37.1 °C)] 98.8 °F (37.1 °C)  Pulse:  [] 99  Resp:  [17-20] 18  SpO2:  [91 %-96 %] 96 %  BP: (109-149)/(74-88) 109/74     Weight: 45.4 kg (100 lb)  Body mass index is 14.77 kg/m².    Intake/Output Summary (Last 24 hours) at 07/30/17 1100  Last data filed at 07/29/17 1600   Gross per 24 hour   Intake              375 ml   Output                0 ml   Net              375 ml      Physical Exam   Constitutional: He is oriented to person, place, and time. No distress.   Cardiovascular: Normal rate and regular  rhythm.    Pulmonary/Chest: Effort normal and breath sounds normal. No respiratory distress. He has no wheezes.   Musculoskeletal: He exhibits no edema.   Neurological: He is alert and oriented to person, place, and time.   Psychiatric: He has a normal mood and affect.   Nursing note and vitals reviewed.      Significant Labs: None new  Significant Imaging: None new    Assessment/Plan:      * Bacteremia due to methicillin resistant Staphylococcus aureus    Pneumonia of right lung due to methicillin resistant Staphylococcus aureus (MRSA)  Continue vancomycin.  JANET Monday.          Right pulmonary embolus    Started therapeutic enoxaparin on 7/29/17, and can transition to novel oral anticoagulant on discharge.          History of TB (tuberculosis)    Airborne isolation.  Collecting sputum samples for AFB per ID recommendations.        Chronic bullous emphysema    Supplemental oxygen dependent  Takes mometasone-formoterol (Dulera) 200-5 mcg BID, albuterol inhaler PRN, albuterol-ipratropium nebulizer PRN.  Fluticasone-vilanterol 200-25 mcg daily in place of Dulera.  Continue nasal cannula.          Diastolic dysfunction    Pulmonary hypotension  Monitor for fluid overload.          Former smoker    Stopped after last hospitalization on 6/23/17.          Muscular deconditioning    Return to [facility] on discharge.            VTE Risk Mitigation         Ordered     Medium Risk of VTE  Once      07/25/17 0727     Place sequential compression device  Until discontinued      07/25/17 0727     Place MARSHA hose  Until discontinued      07/25/17 0727        Disposition: JANET Monday.  Return to [facility] after final antibiotic recommendation.    Kenan Cox MD  Department of Hospital Medicine   [facility]

## 2017-07-30 NOTE — PROGRESS NOTES
[facility]  Infectious Disease  Progress Note    Patient Name: Christiano Baer  MRN: [id number]  Admission Date: 7/25/2017  Length of Stay: 5 days  Attending Physician: Kenan Cox MD  Primary Care Provider: [facility]    Isolation Status: Contact and Airborne  Assessment/Plan:      * Bacteremia due to methicillin resistant Staphylococcus aureus    -Patient with recent hospitalization from 6/23/2017 - 6/27/2017 for sepsis 2/2 to CAP. Treated with 7-day course of moxifloxacin.   -Blood Culture during last admission were NGTD; Respiratory Culture grew streptococcus group F.   -Presented with cough, SOB, pleuritic chest pain.   -Imaging revealing marked architectural distortion/scarring in the RIGHT hemithorax similar to prior except for increased pleural thickening laterally. LEFT lung is hyperinflated with emphysematous change but clear acute disease.   -Febrile upon admission, WBC this AM 17.69, Pro-deandre elevated at 18.   -Blood Cultures from 7/25 revealing MRSA.   -Repeat Blood culture from 7/26 revealing NGTD.    -De-escalated antibiotics per primary and now only on vancomycin - agree with de-escalation.   -Unclear etiology of bacteremia at this time, patient denies IVDU and has no chronic lines or skin breakdown. Etiology likely could be Staph Pneumonia vs Recurrence of TB. -Repeat ECHO revealing no evidence of pericardial effusion, intracavity mass, thrombi, or vegetation. Plan for JANET on 7/31.   -Vanc Trough 8.7 on 7/29. Have increased vancomycin dose to 1g q12h.   -Recommend continuing current antibiotic therapy at this time and rule out TB. Patient now on isolation. First AFB stain negative for AFB culture pending. Spoke with nursing today to obtain 2nd AFB today which is pending, 3rd tomorrow.   -CT findings show a bulky thrombus in the right main pulmonary artery with occlusion of the right middle and upper lobe segmental arteries. This could be chronic in nature but also  could a nidus for MRSA infection. Also shows a trace loculated pleural effsuion.             Anticipated Disposition: As per primary team    Thank you for your consult. I will follow-up with patient. Please contact us if you have any additional questions.    Lev Peck MD, PhD  Infectious Disease, PGY-4  [facility]    Subjective:     Principal Problem:Bacteremia due to methicillin resistant Staphylococcus aureus    HPI:   Patient is a 55 y/o AA M with a PMH significant for COPD on 2L NC, hx of TB (treated in 2003), and hx of alcohol abuse with pancreatitis in 2014. Patient was a poor historian upon examination and history was obtained per chart review. Patient was recently hospitalized at [facility] from 6/23/2017 - 6/26/2017 for sepsis with hypotension secondary to CAP. During that admission, he was treated with Vancomcyin/Cefepime/Flagyl/and Ciprofloxacin initially. Blood cultures during that admission revealed no growth however respiratory cultures revealed Streptococcus Group F. Mr. Delacruz subsequently improved clinically and his antibiotics were de-escalated to moxifloxacin. He was subsequently discharged on a 7 day course of moxifloxacin. On 07/25/2017 the patient presented back to [facility] with complaints of cough, SOB, and pleuritic chest pain. Patient was found to be tachycardic and hypotensive and he was treated for sepsis. CXR revealed marked architectural distortion/scarring in the RIGHT hemithorax and hyperinflated L lung with emphysematous change but clear of acute disease. WBC was 22.52, Pro-Sadi is elevated at 18, and the patient has been febrile with a Tmax of 100.6 in the past 24 hours. Patient was started on vancomycin, ciprofloxacin, and cefepime. Subsequent blood cultures from admission have grown staphylococcus aureus. Upon examination patient endorses productive cough, pleuritic chest pain, and SOB.      Microbiology Data  6/23/2017     Blood Cultures x 2        "NGTD  6/23/2017     Urine Culture               No growth  6/23/2017     Respiratory Culture     Streptococcus Group F. Gram Stain with many gram positive cocci, many gram negative rods, moderate gram negative diplococci.  7/25/2017     Blood Culture x 2         MRSA  Susceptibility    Methicillin resistant staphylococcus aureus    CULTURE, BLOOD    Clindamycin <=0.5 "><=0.5  Sensitive    Erythromycin <=0.5 "><=0.5  Sensitive    Oxacillin >2  Resistant    Penicillin >8  Resistant    Tetracycline <=4 "><=4  Sensitive    Trimeth/Sulfa <=0.5/9.5 "><=0.5/9.5  Sensitive    Vancomycin 1  Sensitive     7/26/2017     Blood Culture x 2                 NGTD  7/26/2017     Respiratory Culture             Staph Aureus (susceptibilities pending). Few WBC's; Few Gram Positive Cocci     7/27/2017     AFB Culture/Smear              No AFB seen on stain. Culture In Process  7/29/2017     AFB                                         In progress     Antibiotics  Cefepime            2g IVPB q12h                7/25 - 7/27  Ciprofloxacin       400mg IVPB x 1            7/25  Vancomycin        1g (in ED)/750mg qD    7/25 - present  Azithromycin        500mg/250mg qD        7/26 - 7/28    Interval History: Pt without complaint today, denies fever or chills, nausea or vomiting, chest pain or SOB    Review of Systems   Constitutional: Negative for chills and fever.   Respiratory: Negative for shortness of breath.    Cardiovascular: Negative for chest pain.   Gastrointestinal: Negative for nausea and vomiting.     Objective:     Vital Signs (Most Recent):  Temp: 98.1 °F (36.7 °C) (07/30/17 1220)  Pulse: 99 (07/30/17 1555)  Resp: 18 (07/30/17 1555)  BP: 118/81 (07/30/17 1220)  SpO2: 96 % (07/30/17 1555) Vital Signs (24h Range):  Temp:  [97.6 °F (36.4 °C)-98.8 °F (37.1 °C)] 98.1 °F (36.7 °C)  Pulse:  [] 99  Resp:  [17-20] 18  SpO2:  [91 %-96 %] 96 %  BP: (109-149)/(74-88) 118/81     Weight: 45.4 kg (100 lb)  Body mass index is 14.77 " kg/m².    Estimated Creatinine Clearance: 64.6 mL/min (based on Cr of 0.8).    Physical Exam   Constitutional: He is oriented to person, place, and time.   Cachetic   HENT:   Head: Normocephalic and atraumatic.   Mouth/Throat: Oropharynx is clear and moist.   Bitemporal wasting   Eyes: Pupils are equal, round, and reactive to light.   Neck: No JVD present.   Cardiovascular: Normal rate and regular rhythm.  Exam reveals no gallop and no friction rub.    No murmur heard.  Pulmonary/Chest: Effort normal. No respiratory distress. He has no wheezes. He has no rales.   Decreased on right side   Abdominal: Soft. Bowel sounds are normal. He exhibits no distension. There is no rebound and no guarding.   Musculoskeletal: Normal range of motion. He exhibits no edema, tenderness or deformity.   Lymphadenopathy:     He has no cervical adenopathy.   Neurological: He is alert and oriented to person, place, and time.   Skin: Skin is warm and dry.       Significant Labs:   CBC: No results for input(s): WBC, HGB, HCT, PLT in the last 48 hours.  Coagulation: No results for input(s): INR, APTT in the last 48 hours.    Invalid input(s): PT  Microbiology Results (last 7 days)     Procedure Component Value Units Date/Time    Blood culture [[id number]] Collected:  07/26/17 1040    Order Status:  Completed Specimen:  Blood Updated:  07/30/17 1622     Blood Culture, Routine No Growth to date     Blood Culture, Routine No Growth to date     Blood Culture, Routine No Growth to date     Blood Culture, Routine No Growth to date     Blood Culture, Routine No Growth to date    Blood culture [[id number]] Collected:  07/26/17 1040    Order Status:  Completed Specimen:  Blood Updated:  07/30/17 1622     Blood Culture, Routine No Growth to date     Blood Culture, Routine No Growth to date     Blood Culture, Routine No Growth to date     Blood Culture, Routine No Growth to date     Blood Culture, Routine No Growth to date    AFB Culture & Smear  [[id number]]     Order Status:  No result Specimen:  Sputum from Sputum, Expectorated     AFB Culture & Smear [[id number]] Collected:  07/29/17 1223    Order Status:  Sent Specimen:  Respiratory from Sputum, Expectorated Updated:  07/29/17 1613    Culture, Respiratory with Gram Stain [[id number]]  (Susceptibility) Collected:  07/26/17 1436    Order Status:  Completed Specimen:  Respiratory from Sputum Updated:  07/29/17 1031     Respiratory Culture --     METHICILLIN RESISTANT STAPHYLOCOCCUS AUREUS  Many  Normal respiratory ignacio also present       Gram Stain (Respiratory) <10 epithelial cells per low power field.     Gram Stain (Respiratory) Few WBC's     Gram Stain (Respiratory) Few Gram positive cocci    AFB Culture & Smear [[id number]] Collected:  07/27/17 0748    Order Status:  Completed Specimen:  Respiratory from Sputum, Expectorated Updated:  07/28/17 2127     AFB Culture & Smear Culture in progress     AFB CULTURE STAIN No acid fast bacilli seen.    Narrative:       Please get sputum AFB smear and culture X 3    Blood culture x two cultures. Draw prior to antibiotics. [[id number]] Collected:  07/25/17 0250    Order Status:  Completed Specimen:  Blood from Peripheral, Wrist, Left Updated:  07/28/17 1033     Blood Culture, Routine Gram stain jennifer bottle: Gram positive cocci in clusters resembling Staph      Blood Culture, Routine Results called to and read back by:Mary Licona RN 07/26/2017  10:44     Blood Culture, Routine --     METHICILLIN RESISTANT STAPHYLOCOCCUS AUREUS  ID consult required at [facility] and [facility].  For susceptibility see order #[id number]      Narrative:       Aerobic and anaerobic    Blood culture x two cultures. Draw prior to antibiotics. [[id number]]  (Susceptibility) Collected:  07/25/17 0249    Order Status:  Completed Specimen:  Blood from Peripheral, Forearm, Right Updated:  07/27/17 1042     Blood Culture, Routine Gram stain aer bottle: Gram positive cocci in clusters  resembling Staph     Blood Culture, Routine Results called to and read back by:Philomena Cloud RN 07/25/2017  21:01     Blood Culture, Routine --     METHICILLIN RESISTANT STAPHYLOCOCCUS AUREUS  ID consult required at [facility] and [facility].      Narrative:       Aerobic and anaerobic    AFB Culture & Smear [[id number]]     Order Status:  Canceled Specimen:  Respiratory from Sputum, Expectorated     AFB Culture & Smear [[id number]]     Order Status:  Canceled Specimen:  Respiratory from Sputum, Expectorated           Significant Imaging: I have reviewed all pertinent imaging results/findings within the past 24 hours.

## 2017-07-30 NOTE — ASSESSMENT & PLAN NOTE
-Patient with recent hospitalization from 6/23/2017 - 6/27/2017 for sepsis 2/2 to CAP. Treated with 7-day course of moxifloxacin.   -Blood Culture during last admission were NGTD; Respiratory Culture grew streptococcus group F.   -Presented with cough, SOB, pleuritic chest pain.   -Imaging revealing marked architectural distortion/scarring in the RIGHT hemithorax similar to prior except for increased pleural thickening laterally. LEFT lung is hyperinflated with emphysematous change but clear acute disease.   -Febrile upon admission, WBC this AM 17.69, Pro-deandre elevated at 18.   -Blood Cultures from 7/25 revealing MRSA.   -Repeat Blood culture from 7/26 revealing NGTD.    -De-escalated antibiotics per primary and now only on vancomycin - agree with de-escalation.   -Unclear etiology of bacteremia at this time, patient denies IVDU and has no chronic lines or skin breakdown. Etiology likely could be Staph Pneumonia vs Recurrence of TB. -Repeat ECHO revealing no evidence of pericardial effusion, intracavity mass, thrombi, or vegetation. Plan for JANET on 7/31.   -Vanc Trough 8.7 on 7/29. Have increased vancomycin dose to 1g q12h.   -Recommend continuing current antibiotic therapy at this time and rule out TB. Patient now on isolation. First AFB stain negative for AFB culture pending. Spoke with nursing today to obtain 2nd AFB today which is pending, 3rd tomorrow.   -CT findings show a bulky thrombus in the right main pulmonary artery with occlusion of the right middle and upper lobe segmental arteries. This could be chronic in nature but also could a nidus for MRSA infection. Also shows a trace loculated pleural effsuion.

## 2017-07-30 NOTE — CONSULTS
[facility]  Cardiology  Consult Note    Patient Name: Christiano Baer  MRN: [id number]  Admission Date: 7/25/2017  Hospital Length of Stay: 5 days  Code Status: Full Code   Attending Provider: Kenan Cox MD   Consulting Provider: Presley Saucedo MD  Primary Care Physician: [facility]  Principal Problem:Bacteremia due to methicillin resistant Staphylococcus aureus    Patient information was obtained from patient and ER records.     Consults  Subjective:     Chief Complaint:  Bacteremia     HPI:   55yo male with history of COPD- on home O2, TB, tobacco abuse, chronic hypoxic respiratory failure, PNA, ETOH abuse and pancreatitis who presented to the ER with complaints of n/v, SOB and body ache. He was admitted in late June with PNA to [facility] and was treated with Avelox and discharge to [facility]. He reports doing well upon discharge until recently when he felt achy with SOB prompting [facility]. He reports feeling better now and currently has no complaints     Updated HPI:  Overnight no acute events. Main complaint is of persistent SOB. Hemodynamically stable.     Past Medical History:   Diagnosis Date    Alcohol abuse 6/23/2017    Alcoholic pancreatitis 02/23/2014    COPD (chronic obstructive pulmonary disease)     ETOH abuse     Hypotension due to drugs 6/25/2017    Tuberculosis 2006       Past Surgical History:   Procedure Laterality Date    CHEST TUBE INSERTION  November 2013       Review of patient's allergies indicates:  No Known Allergies    No current facility-administered medications on file prior to encounter.      Current Outpatient Prescriptions on File Prior to Encounter   Medication Sig    gabapentin (NEURONTIN) 300 MG capsule Take 300 mg by mouth 3 (three) times daily.    lactobacillus acidophilus & bulgar (LACTINEX) 100 million cell packet Take 1 packet (1 each total) by mouth once daily.    mometasone-formoterol (DULERA) 200-5  mcg/actuation inhaler Inhale 2 puffs into the lungs 2 (two) times daily. Controller    albuterol 90 mcg/actuation inhaler Inhale 1-2 puffs into the lungs every 6 (six) hours as needed for Wheezing.     Family History     None        Social History Main Topics    Smoking status: Former Smoker     Packs/day: 1.00     Types: Cigarettes    Smokeless tobacco: Never Used    Alcohol use No      Comment: former alcoholic     Drug use: No    Sexual activity: Not on file     Review of Systems   Constitution: Negative for weakness and malaise/fatigue.   HENT: Negative for congestion and headaches.    Eyes: Negative for blurred vision.   Cardiovascular: Positive for cyanosis and dyspnea on exertion. Negative for chest pain, claudication, irregular heartbeat, leg swelling, near-syncope, orthopnea, palpitations, paroxysmal nocturnal dyspnea and syncope.   Respiratory: Positive for shortness of breath.    Endocrine: Negative for polyuria.   Hematologic/Lymphatic: Negative for bleeding problem.   Skin: Negative for itching and rash.   Musculoskeletal: Negative for joint swelling, muscle cramps and muscle weakness.   Gastrointestinal: Negative for abdominal pain, hematemesis, hematochezia, melena, nausea and vomiting.   Genitourinary: Negative for dysuria and hematuria.   Neurological: Negative for dizziness, focal weakness, light-headedness and loss of balance.   Psychiatric/Behavioral: Negative for depression. The patient is not nervous/anxious.      Objective:     Vital Signs (Most Recent):  Temp: 98.8 °F (37.1 °C) (07/30/17 0818)  Pulse: 99 (07/30/17 0818)  Resp: 18 (07/30/17 0818)  BP: 109/74 (07/30/17 0818)  SpO2: 96 % (07/30/17 0808) Vital Signs (24h Range):  Temp:  [97.6 °F (36.4 °C)-98.8 °F (37.1 °C)] 98.8 °F (37.1 °C)  Pulse:  [] 99  Resp:  [17-20] 18  SpO2:  [91 %-96 %] 96 %  BP: (109-149)/(74-88) 109/74     Weight: 45.4 kg (100 lb)  Body mass index is 14.77 kg/m².    SpO2: 96 %  O2 Device (Oxygen Therapy):  nasal cannula      Intake/Output Summary (Last 24 hours) at 07/30/17 1033  Last data filed at 07/29/17 1600   Gross per 24 hour   Intake              375 ml   Output                0 ml   Net              375 ml       Lines/Drains/Airways     Peripheral Intravenous Line                 Peripheral IV - Single Lumen 07/29/17 1729 Right Forearm less than 1 day                Physical Exam   Constitutional: He is oriented to person, place, and time. He appears well-developed and well-nourished.   HENT:   Head: Normocephalic and atraumatic.   Neck: Neck supple. No JVD present.   Cardiovascular: Normal rate, regular rhythm and normal heart sounds.    Pulses:       Carotid pulses are 2+ on the right side, and 2+ on the left side.       Radial pulses are 2+ on the right side, and 2+ on the left side.        Femoral pulses are 2+ on the right side, and 2+ on the left side.       Dorsalis pedis pulses are 2+ on the right side, and 2+ on the left side.        Posterior tibial pulses are 2+ on the right side, and 2+ on the left side.   Pulmonary/Chest: Effort normal and breath sounds normal.   Abdominal: Soft. Bowel sounds are normal.   Musculoskeletal: He exhibits no edema.   Neurological: He is alert and oriented to person, place, and time.   Skin: Skin is warm and dry.   Psychiatric: He has a normal mood and affect. His behavior is normal. Thought content normal.       Significant Labs: Blood Culture: No results for input(s): LABBLOO in the last 48 hours., BMP: No results for input(s): GLU, NA, K, CL, CO2, BUN, CREATININE, CALCIUM, MG in the last 48 hours., CMP No results for input(s): NA, K, CL, CO2, GLU, BUN, CREATININE, CALCIUM, PROT, ALBUMIN, BILITOT, ALKPHOS, AST, ALT, ANIONGAP, ESTGFRAFRICA, EGFRNONAA in the last 48 hours., CBC No results for input(s): WBC, HGB, HCT, PLT in the last 48 hours., INR No results for input(s): INR, PROTIME in the last 48 hours., Lipid Panel No results for input(s): CHOL, HDL, LDLCALC, TRIG,  CHOLHDL in the last 48 hours. and Troponin No results for input(s): TROPONINI in the last 48 hours.      Assessment and Plan:     57 y/o male with hx and presentation as above. Will plan for JANET tomorrow with anesthesia monitored sedation. Consents obtained.     Active Diagnoses:    Diagnosis Date Noted POA    PRINCIPAL PROBLEM:  Bacteremia due to methicillin resistant Staphylococcus aureus [R78.81] 06/23/2017 Yes    Right pulmonary embolus [I26.99] 07/29/2017 Yes    History of TB (tuberculosis) [Z86.11] 07/27/2017 Yes    Supplemental oxygen dependent [Z99.81] 07/25/2017 Not Applicable     Chronic    Pulmonary hypertension [I27.2] 06/25/2017 Yes     Chronic    Diastolic dysfunction [I51.9] 06/25/2017 Yes     Chronic    Pneumonia of right lung due to methicillin resistant Staphylococcus aureus (MRSA) [J15.212] 06/23/2017 Yes    Former smoker [Z87.891] 06/23/2017 Not Applicable    Chronic bullous emphysema [J43.9] 11/08/2014 Yes     Chronic      Problems Resolved During this Admission:    Diagnosis Date Noted Date Resolved POA    Leukocytosis [D72.829] 07/26/2017 07/27/2017 Yes    Hyperbilirubinemia [E80.6] 07/25/2017 07/27/2017 Yes    Hypomagnesemia [E83.42] 07/25/2017 07/27/2017 Yes    MARLENA (acute kidney injury) [N17.9] 06/23/2017 07/27/2017 Yes    Acute on chronic respiratory failure with hypoxia [J96.21] 06/23/2017 07/28/2017 Yes    COPD exacerbation [J44.1] 06/23/2017 07/29/2017 Yes    Volume depletion [E86.9] 06/23/2017 07/26/2017 Yes    Hyponatremia [E87.1] 06/23/2017 07/26/2017 Yes       VTE Risk Mitigation         Ordered     Medium Risk of VTE  Once      07/25/17 0727     Place sequential compression device  Until discontinued      07/25/17 0727     Place MARSHA hose  Until discontinued      07/25/17 0727          Thank you for your consult. I will follow-up with patient. Please contact us if you have any additional questions.    Presley Saucedo MD  Cardiology   [facility]

## 2017-07-30 NOTE — ANESTHESIA PREPROCEDURE EVALUATION
07/30/2017  Christiano Baer is a 56 y.o., male w sepsis of unknown origin for JANET    PRIOR ANES (in Epic)  2017-07-31   none    ANES-RELATED MED/SURG  EtOH Overuse   - pancreatitis hx  R Lung Pneumonia hx   - scarring R lung  Pulmonary HTN  Pulmonary Embolism hx  TB hx    Past Surgical History:   Procedure Laterality Date    CHEST TUBE INSERTION  November 2013     ALLERGIES 2017-07-31  Review of patient's allergies indicates:  No Known Allergies    ANES-RELATED MAR  enoxaparin albuterol-ipratropium vancomycin    fluticasone-vilanterol    Anesthesia Evaluation    I have reviewed the Patient Summary Reports.        Review of Systems    Wt Readings from Last 1 Encounters:   07/25/17 45.4 kg (100 lb)     Temp Readings from Last 1 Encounters:   07/31/17 36.6 °C (97.9 °F) (Oral)     BP Readings from Last 1 Encounters:   07/31/17 117/83     Pulse Readings from Last 1 Encounters:   07/31/17 103     SpO2 Readings from Last 1 Encounters:   07/31/17 98%       Physical Exam  General:  Well nourished, Cachexia    Airway/Jaw/Neck:  Airway Findings: Mouth Opening: Normal Tongue: Normal  General Airway Assessment: Adult  Mallampati: II  Improves to II with phonation.  TM Distance: Normal, at least 6 cm       Chest/Lungs:  Chest/Lungs Findings: Clear to auscultation, Normal Respiratory Rate     Heart/Vascular:  Heart Findings: Rate: Normal  Rhythm: Regular Rhythm  Sounds: Normal        Mental Status:  Mental Status Findings:  Cooperative, Alert and Oriented       Lab Results   Component Value Date    WBC 6.85 07/31/2017    HGB 12.9 (L) 07/31/2017    HCT 38.3 (L) 07/31/2017    MCV 95 07/31/2017     07/31/2017       Chemistry        Component Value Date/Time     07/31/2017 0421    K 4.4 07/31/2017 0421     07/31/2017 0421    CO2 27 07/31/2017 0421    BUN 14 07/31/2017 0421    CREATININE 0.8 07/31/2017  0421    GLU 79 07/31/2017 0421        Component Value Date/Time    CALCIUM 8.9 07/31/2017 0421    ALKPHOS 76 07/27/2017 0603    AST 29 07/27/2017 0603    ALT 30 07/27/2017 0603    BILITOT 0.8 07/27/2017 0603    ESTGFRAFRICA >60 07/31/2017 0421    EGFRNONAA >60 07/31/2017 0421        Lab Results   Component Value Date    ALBUMIN 2.2 (L) 07/27/2017     Lab Results   Component Value Date    TSH 1.108 07/25/2017       CXR 2017-07-25  Marked architectural distortion/scarring in the RIGHT hemithorax similar to prior except for increased pleural thickening laterally. Remainder of the exam appears unchanged.    EKG 2017-07-25  Sinus tachycardia  Septal infarct ,age undetermined  ST and T wave abnormality, consider lateral ischemia  Abnormal ECG  When compared with ECG of 23-JUN-2017 20:50,  ST now depressed in Inferior leads  T wave inversion now evident in Inferior leads  Confirmed by Neno     ECHO 2017-07-27    1 - Normal LV systolic function (EF 55-60%).     2 - Pulmonary htn; estimated PA systolic 57 mmHg.     3 - RV enlargement with normal systolic function.     4 - Impaired LV relaxation, normal LAP          (grade 1 diastolic dysfunction).     Anesthesia Plan  Type of Anesthesia, risks & benefits discussed:  Anesthesia Type:  MAC  Patient's Preference:   Intra-op Monitoring Plan:   Intra-op Monitoring Plan Comments:   Post Op Pain Control Plan:   Post Op Pain Control Plan Comments:   Induction:   IV  Beta Blocker:         Informed Consent: Patient understands risks and agrees with Anesthesia plan.  Questions answered. Anesthesia consent signed with patient.  ASA Score: 3     Day of Surgery Review of History & Physical: I have interviewed and examined the patient. I have reviewed the patient's H&P dated:  There are no significant changes.  H&P update referred to the provider.  H&P completed by Anesthesiologist.   Anesthesia Plan Notes: TB precautions  MRSA precautions         Ready For Surgery From Anesthesia  Perspective.

## 2017-07-30 NOTE — PLAN OF CARE
Problem: Patient Care Overview  Goal: Plan of Care Review  Outcome: Ongoing (interventions implemented as appropriate)  Pt is cooperating with the plan of care.  Pt states he doesn't like being isolation, but understands why he was placed in isolation.

## 2017-07-30 NOTE — SUBJECTIVE & OBJECTIVE
Interval History: Consent for JANET obtained by Cardiology today.    Review of Systems   Constitutional: Negative for chills and fever.   Respiratory: Negative for shortness of breath.    Cardiovascular: Negative for chest pain and leg swelling.     Objective:     Vital Signs (Most Recent):  Temp: 98.8 °F (37.1 °C) (07/30/17 0818)  Pulse: 99 (07/30/17 0818)  Resp: 18 (07/30/17 0818)  BP: 109/74 (07/30/17 0818)  SpO2: 96 % (07/30/17 0808) Vital Signs (24h Range):  Temp:  [97.6 °F (36.4 °C)-98.8 °F (37.1 °C)] 98.8 °F (37.1 °C)  Pulse:  [] 99  Resp:  [17-20] 18  SpO2:  [91 %-96 %] 96 %  BP: (109-149)/(74-88) 109/74     Weight: 45.4 kg (100 lb)  Body mass index is 14.77 kg/m².    Intake/Output Summary (Last 24 hours) at 07/30/17 1100  Last data filed at 07/29/17 1600   Gross per 24 hour   Intake              375 ml   Output                0 ml   Net              375 ml      Physical Exam   Constitutional: He is oriented to person, place, and time. No distress.   Cardiovascular: Normal rate and regular rhythm.    Pulmonary/Chest: Effort normal and breath sounds normal. No respiratory distress. He has no wheezes.   Musculoskeletal: He exhibits no edema.   Neurological: He is alert and oriented to person, place, and time.   Psychiatric: He has a normal mood and affect.   Nursing note and vitals reviewed.      Significant Labs: None new  Significant Imaging: None new

## 2017-07-30 NOTE — SUBJECTIVE & OBJECTIVE
Interval History: Pt without complaint today, denies fever or chills, nausea or vomiting, chest pain or SOB    Review of Systems   Constitutional: Negative for chills and fever.   Respiratory: Negative for shortness of breath.    Cardiovascular: Negative for chest pain.   Gastrointestinal: Negative for nausea and vomiting.     Objective:     Vital Signs (Most Recent):  Temp: 98.1 °F (36.7 °C) (07/30/17 1220)  Pulse: 99 (07/30/17 1555)  Resp: 18 (07/30/17 1555)  BP: 118/81 (07/30/17 1220)  SpO2: 96 % (07/30/17 1555) Vital Signs (24h Range):  Temp:  [97.6 °F (36.4 °C)-98.8 °F (37.1 °C)] 98.1 °F (36.7 °C)  Pulse:  [] 99  Resp:  [17-20] 18  SpO2:  [91 %-96 %] 96 %  BP: (109-149)/(74-88) 118/81     Weight: 45.4 kg (100 lb)  Body mass index is 14.77 kg/m².    Estimated Creatinine Clearance: 64.6 mL/min (based on Cr of 0.8).    Physical Exam   Constitutional: He is oriented to person, place, and time.   Cachetic   HENT:   Head: Normocephalic and atraumatic.   Mouth/Throat: Oropharynx is clear and moist.   Bitemporal wasting   Eyes: Pupils are equal, round, and reactive to light.   Neck: No JVD present.   Cardiovascular: Normal rate and regular rhythm.  Exam reveals no gallop and no friction rub.    No murmur heard.  Pulmonary/Chest: Effort normal. No respiratory distress. He has no wheezes. He has no rales.   Decreased on right side   Abdominal: Soft. Bowel sounds are normal. He exhibits no distension. There is no rebound and no guarding.   Musculoskeletal: Normal range of motion. He exhibits no edema, tenderness or deformity.   Lymphadenopathy:     He has no cervical adenopathy.   Neurological: He is alert and oriented to person, place, and time.   Skin: Skin is warm and dry.       Significant Labs:   CBC: No results for input(s): WBC, HGB, HCT, PLT in the last 48 hours.  Coagulation: No results for input(s): INR, APTT in the last 48 hours.    Invalid input(s): PT  Microbiology Results (last 7 days)     Procedure  Component Value Units Date/Time    Blood culture [[id number]] Collected:  07/26/17 1040    Order Status:  Completed Specimen:  Blood Updated:  07/30/17 1622     Blood Culture, Routine No Growth to date     Blood Culture, Routine No Growth to date     Blood Culture, Routine No Growth to date     Blood Culture, Routine No Growth to date     Blood Culture, Routine No Growth to date    Blood culture [[id number]] Collected:  07/26/17 1040    Order Status:  Completed Specimen:  Blood Updated:  07/30/17 1622     Blood Culture, Routine No Growth to date     Blood Culture, Routine No Growth to date     Blood Culture, Routine No Growth to date     Blood Culture, Routine No Growth to date     Blood Culture, Routine No Growth to date    AFB Culture & Smear [[id number]]     Order Status:  No result Specimen:  Sputum from Sputum, Expectorated     AFB Culture & Smear [[id number]] Collected:  07/29/17 1223    Order Status:  Sent Specimen:  Respiratory from Sputum, Expectorated Updated:  07/29/17 1613    Culture, Respiratory with Gram Stain [[id number]]  (Susceptibility) Collected:  07/26/17 1436    Order Status:  Completed Specimen:  Respiratory from Sputum Updated:  07/29/17 1031     Respiratory Culture --     METHICILLIN RESISTANT STAPHYLOCOCCUS AUREUS  Many  Normal respiratory ignacio also present       Gram Stain (Respiratory) <10 epithelial cells per low power field.     Gram Stain (Respiratory) Few WBC's     Gram Stain (Respiratory) Few Gram positive cocci    AFB Culture & Smear [[id number]] Collected:  07/27/17 0748    Order Status:  Completed Specimen:  Respiratory from Sputum, Expectorated Updated:  07/28/17 2127     AFB Culture & Smear Culture in progress     AFB CULTURE STAIN No acid fast bacilli seen.    Narrative:       Please get sputum AFB smear and culture X 3    Blood culture x two cultures. Draw prior to antibiotics. [[id number]] Collected:  07/25/17 0250    Order Status:  Completed Specimen:  Blood from Peripheral, Wrist,  Left Updated:  07/28/17 1033     Blood Culture, Routine Gram stain jennifer bottle: Gram positive cocci in clusters resembling Staph      Blood Culture, Routine Results called to and read back by:Mary Licona RN 07/26/2017  10:44     Blood Culture, Routine --     METHICILLIN RESISTANT STAPHYLOCOCCUS AUREUS  ID consult required at [facility].  For susceptibility see order #[id number]      Narrative:       Aerobic and anaerobic    Blood culture x two cultures. Draw prior to antibiotics. [[id number]]  (Susceptibility) Collected:  07/25/17 0249    Order Status:  Completed Specimen:  Blood from Peripheral, Forearm, Right Updated:  07/27/17 1042     Blood Culture, Routine Gram stain aer bottle: Gram positive cocci in clusters resembling Staph     Blood Culture, Routine Results called to and read back by:Philomena Cloud RN 07/25/2017  21:01     Blood Culture, Routine --     METHICILLIN RESISTANT STAPHYLOCOCCUS AUREUS  ID consult required at [facility].      Narrative:       Aerobic and anaerobic    AFB Culture & Smear [[id number]]     Order Status:  Canceled Specimen:  Respiratory from Sputum, Expectorated     AFB Culture & Smear [[id number]]     Order Status:  Canceled Specimen:  Respiratory from Sputum, Expectorated           Significant Imaging: I have reviewed all pertinent imaging results/findings within the past 24 hours.

## 2017-07-30 NOTE — ASSESSMENT & PLAN NOTE
Pneumonia of right lung due to methicillin resistant Staphylococcus aureus (MRSA)  Continue vancomycin.  JANET Monday.

## 2017-07-30 NOTE — PLAN OF CARE
Problem: Patient Care Overview  Goal: Plan of Care Review  SpO2   94% on  3 lpm NC. No apparent distress noted. Will continue to monitor.

## 2017-07-31 ENCOUNTER — SURGERY (OUTPATIENT)
Age: 57
End: 2017-07-31

## 2017-07-31 ENCOUNTER — ANESTHESIA (OUTPATIENT)
Dept: SURGERY | Facility: HOSPITAL | Age: 57
DRG: 871 | End: 2017-07-31
Payer: MEDICARE

## 2017-07-31 LAB
ANION GAP SERPL CALC-SCNC: 7 MMOL/L
BACTERIA BLD CULT: NORMAL
BACTERIA BLD CULT: NORMAL
BASOPHILS # BLD AUTO: ABNORMAL K/UL
BASOPHILS NFR BLD: 0 %
BUN SERPL-MCNC: 14 MG/DL
CALCIUM SERPL-MCNC: 8.9 MG/DL
CHLORIDE SERPL-SCNC: 104 MMOL/L
CO2 SERPL-SCNC: 27 MMOL/L
CREAT SERPL-MCNC: 0.8 MG/DL
DIFFERENTIAL METHOD: ABNORMAL
EOSINOPHIL # BLD AUTO: ABNORMAL K/UL
EOSINOPHIL NFR BLD: 5 %
ERYTHROCYTE [DISTWIDTH] IN BLOOD BY AUTOMATED COUNT: 13.6 %
EST. GFR  (AFRICAN AMERICAN): >60 ML/MIN/1.73 M^2
EST. GFR  (NON AFRICAN AMERICAN): >60 ML/MIN/1.73 M^2
GLUCOSE SERPL-MCNC: 79 MG/DL
HCT VFR BLD AUTO: 38.3 %
HGB BLD-MCNC: 12.9 G/DL
LYMPHOCYTES # BLD AUTO: ABNORMAL K/UL
LYMPHOCYTES NFR BLD: 47 %
MCH RBC QN AUTO: 31.9 PG
MCHC RBC AUTO-ENTMCNC: 33.7 G/DL
MCV RBC AUTO: 95 FL
MONOCYTES # BLD AUTO: ABNORMAL K/UL
MONOCYTES NFR BLD: 2 %
NEUTROPHILS NFR BLD: 40 %
NEUTS BAND NFR BLD MANUAL: 6 %
PLATELET # BLD AUTO: 267 K/UL
PLATELET BLD QL SMEAR: ABNORMAL
PMV BLD AUTO: 9.8 FL
POTASSIUM SERPL-SCNC: 4.4 MMOL/L
RBC # BLD AUTO: 4.05 M/UL
SODIUM SERPL-SCNC: 138 MMOL/L
WBC # BLD AUTO: 6.85 K/UL

## 2017-07-31 PROCEDURE — G8988 SELF CARE GOAL STATUS: HCPCS | Mod: CI

## 2017-07-31 PROCEDURE — 93325 DOPPLER ECHO COLOR FLOW MAPG: CPT

## 2017-07-31 PROCEDURE — 80048 BASIC METABOLIC PNL TOTAL CA: CPT

## 2017-07-31 PROCEDURE — 37000008 HC ANESTHESIA 1ST 15 MINUTES: Performed by: INTERNAL MEDICINE

## 2017-07-31 PROCEDURE — 97535 SELF CARE MNGMENT TRAINING: CPT

## 2017-07-31 PROCEDURE — 25000003 PHARM REV CODE 250: Performed by: NURSE ANESTHETIST, CERTIFIED REGISTERED

## 2017-07-31 PROCEDURE — 71000033 HC RECOVERY, INTIAL HOUR: Performed by: INTERNAL MEDICINE

## 2017-07-31 PROCEDURE — 37000009 HC ANESTHESIA EA ADD 15 MINS: Performed by: INTERNAL MEDICINE

## 2017-07-31 PROCEDURE — 36415 COLL VENOUS BLD VENIPUNCTURE: CPT

## 2017-07-31 PROCEDURE — 27000221 HC OXYGEN, UP TO 24 HOURS

## 2017-07-31 PROCEDURE — 93320 DOPPLER ECHO COMPLETE: CPT | Mod: 26,,, | Performed by: INTERNAL MEDICINE

## 2017-07-31 PROCEDURE — G8989 SELF CARE D/C STATUS: HCPCS | Mod: CI

## 2017-07-31 PROCEDURE — 94640 AIRWAY INHALATION TREATMENT: CPT

## 2017-07-31 PROCEDURE — 63600175 PHARM REV CODE 636 W HCPCS: Performed by: NURSE ANESTHETIST, CERTIFIED REGISTERED

## 2017-07-31 PROCEDURE — 99900035 HC TECH TIME PER 15 MIN (STAT)

## 2017-07-31 PROCEDURE — 25000003 PHARM REV CODE 250: Performed by: NURSE PRACTITIONER

## 2017-07-31 PROCEDURE — 85007 BL SMEAR W/DIFF WBC COUNT: CPT

## 2017-07-31 PROCEDURE — 93325 DOPPLER ECHO COLOR FLOW MAPG: CPT | Mod: 26,,, | Performed by: INTERNAL MEDICINE

## 2017-07-31 PROCEDURE — 25000242 PHARM REV CODE 250 ALT 637 W/ HCPCS: Performed by: PHYSICIAN ASSISTANT

## 2017-07-31 PROCEDURE — 97530 THERAPEUTIC ACTIVITIES: CPT

## 2017-07-31 PROCEDURE — 63600175 PHARM REV CODE 636 W HCPCS: Performed by: INTERNAL MEDICINE

## 2017-07-31 PROCEDURE — 93320 DOPPLER ECHO COMPLETE: CPT

## 2017-07-31 PROCEDURE — 25000003 PHARM REV CODE 250: Performed by: PHYSICIAN ASSISTANT

## 2017-07-31 PROCEDURE — 71000039 HC RECOVERY, EACH ADD'L HOUR: Performed by: INTERNAL MEDICINE

## 2017-07-31 PROCEDURE — 94761 N-INVAS EAR/PLS OXIMETRY MLT: CPT

## 2017-07-31 PROCEDURE — 93312 ECHO TRANSESOPHAGEAL: CPT | Mod: 26,,, | Performed by: INTERNAL MEDICINE

## 2017-07-31 PROCEDURE — 85027 COMPLETE CBC AUTOMATED: CPT

## 2017-07-31 PROCEDURE — 25000003 PHARM REV CODE 250: Performed by: INTERNAL MEDICINE

## 2017-07-31 PROCEDURE — 11000001 HC ACUTE MED/SURG PRIVATE ROOM

## 2017-07-31 PROCEDURE — 63600175 PHARM REV CODE 636 W HCPCS: Performed by: HOSPITALIST

## 2017-07-31 RX ORDER — MIDAZOLAM HYDROCHLORIDE 1 MG/ML
INJECTION, SOLUTION INTRAMUSCULAR; INTRAVENOUS
Status: DISCONTINUED | OUTPATIENT
Start: 2017-07-31 | End: 2017-07-31

## 2017-07-31 RX ORDER — SODIUM CHLORIDE 9 MG/ML
INJECTION, SOLUTION INTRAVENOUS CONTINUOUS PRN
Status: DISCONTINUED | OUTPATIENT
Start: 2017-07-31 | End: 2017-07-31

## 2017-07-31 RX ORDER — KETAMINE HYDROCHLORIDE 50 MG/ML
INJECTION, SOLUTION INTRAMUSCULAR; INTRAVENOUS
Status: DISCONTINUED | OUTPATIENT
Start: 2017-07-31 | End: 2017-07-31

## 2017-07-31 RX ORDER — LIDOCAINE HCL/PF 100 MG/5ML
SYRINGE (ML) INTRAVENOUS
Status: DISCONTINUED | OUTPATIENT
Start: 2017-07-31 | End: 2017-07-31

## 2017-07-31 RX ORDER — PROPOFOL 10 MG/ML
VIAL (ML) INTRAVENOUS
Status: DISCONTINUED | OUTPATIENT
Start: 2017-07-31 | End: 2017-07-31

## 2017-07-31 RX ORDER — VASOPRESSIN 20 [USP'U]/ML
INJECTION, SOLUTION INTRAMUSCULAR; SUBCUTANEOUS
Status: DISCONTINUED | OUTPATIENT
Start: 2017-07-31 | End: 2017-07-31

## 2017-07-31 RX ADMIN — MIDAZOLAM 2 MG: 1 INJECTION INTRAMUSCULAR; INTRAVENOUS at 01:07

## 2017-07-31 RX ADMIN — LIDOCAINE HYDROCHLORIDE 100 MG: 20 INJECTION, SOLUTION INTRAVENOUS at 01:07

## 2017-07-31 RX ADMIN — LIDOCAINE HYDROCHLORIDE 2 MG: 20 INJECTION, SOLUTION INTRAVENOUS at 01:07

## 2017-07-31 RX ADMIN — ACETAMINOPHEN 650 MG: 325 TABLET ORAL at 09:07

## 2017-07-31 RX ADMIN — PROPOFOL 100 MG: 10 INJECTION, EMULSION INTRAVENOUS at 01:07

## 2017-07-31 RX ADMIN — IPRATROPIUM BROMIDE AND ALBUTEROL SULFATE 3 ML: 2.5; .5 SOLUTION RESPIRATORY (INHALATION) at 12:07

## 2017-07-31 RX ADMIN — VANCOMYCIN HYDROCHLORIDE 1000 MG: 1 INJECTION, POWDER, LYOPHILIZED, FOR SOLUTION INTRAVENOUS at 04:07

## 2017-07-31 RX ADMIN — VASOPRESSIN 2 UNITS: 20 INJECTION INTRAVENOUS at 02:07

## 2017-07-31 RX ADMIN — VASOPRESSIN 2 UNITS: 20 INJECTION INTRAVENOUS at 01:07

## 2017-07-31 RX ADMIN — SODIUM CHLORIDE: 0.9 INJECTION, SOLUTION INTRAVENOUS at 01:07

## 2017-07-31 RX ADMIN — KETAMINE HYDROCHLORIDE 25 MG: 50 INJECTION, SOLUTION, CONCENTRATE INTRAMUSCULAR; INTRAVENOUS at 01:07

## 2017-07-31 RX ADMIN — GABAPENTIN 300 MG: 300 CAPSULE ORAL at 09:07

## 2017-07-31 RX ADMIN — ACETAMINOPHEN 650 MG: 325 TABLET ORAL at 04:07

## 2017-07-31 RX ADMIN — FLUTICASONE FUROATE AND VILANTEROL TRIFENATATE 1 PUFF: 200; 25 POWDER RESPIRATORY (INHALATION) at 07:07

## 2017-07-31 RX ADMIN — ENOXAPARIN SODIUM 50 MG: 100 INJECTION SUBCUTANEOUS at 09:07

## 2017-07-31 RX ADMIN — IPRATROPIUM BROMIDE AND ALBUTEROL SULFATE 3 ML: 2.5; .5 SOLUTION RESPIRATORY (INHALATION) at 07:07

## 2017-07-31 RX ADMIN — VANCOMYCIN HYDROCHLORIDE 1000 MG: 1 INJECTION, POWDER, LYOPHILIZED, FOR SOLUTION INTRAVENOUS at 05:07

## 2017-07-31 NOTE — TRANSFER OF CARE
"Anesthesia Transfer of Care Note    Patient: Christiano Baer    Procedure(s) Performed: Procedure(s) (LRB):  TRANSESOPHAGEAL ECHOCARDIOGRAM (JANET) (N/A)    Patient location: OPS    Anesthesia Type: MAC    Transport from OR: Transported from OR on 2-3 L/min O2 by NC with adequate spontaneous ventilation    Post pain: adequate analgesia    Post assessment: no apparent anesthetic complications and tolerated procedure well    Post vital signs: stable    Level of consciousness: awake, alert and oriented    Nausea/Vomiting: no nausea/vomiting    Complications: none    Transfer of care protocol was followed      Last vitals:   Visit Vitals  BP (!) 147/91 (BP Location: Right arm, Patient Position: Lying, BP Method: Automatic)   Pulse 104   Temp 36.2 °C (97.2 °F) (Axillary)   Resp 18   Ht 5' 9" (1.753 m)   Wt 45.4 kg (100 lb)   SpO2 95%   BMI 14.77 kg/m²     "

## 2017-07-31 NOTE — ASSESSMENT & PLAN NOTE
Pneumonia of right lung due to methicillin resistant Staphylococcus aureus (MRSA)  Continue vancomycin through 8/22 as per ID recs.  Repeat blood cultures NG x 5 days.  JANET shows no evidence of thrombus or mass. Afebrile, no elevation in WBC.

## 2017-07-31 NOTE — PT/OT/SLP PROGRESS
"Occupational Therapy  Treatment    Christiano Baer   MRN: [id number]   Admitting Diagnosis: Bacteremia due to methicillin resistant Staphylococcus aureus    OT Date of Treatment: 07/31/17   OT Start Time: 1104  OT Stop Time: 1131  OT Total Time (min): 27 min    Billable Minutes:  Self Care/Home Management 15 and Therapeutic Activity 12    General Precautions: Standard, fall, respiratory  Orthopedic Precautions: N/A  Braces:           Subjective:  Communicated with nsg prior to session.  Pt agitated at this time 2/2 being hungry and unable to eat for test that was scheduled this AM and has not yet been performed. Pt agitated that therapist providing energy conservation techniques as he is " not 87 years old"   Pain/Comfort  Pain Rating 1: 0/10    Objective:        Functional Mobility:  Bed Mobility:  Supine to Sit: Independent  Sit to Supine: Independent    Transfers:   Sit <> Stand Assistance: Modified Independent  Sit <> Stand Assistive Device: No Assistive Device    Functional Ambulation: mod I without AD     Activities of Daily Living:    UE Dressing Level of Assistance: Independent  LE Dressing Level of Assistance: Independent  Grooming Position: Standing  Grooming Level of Assistance: Independent      Balance:   Static Sit: NORMAL: No deviations seen in posture held statically  Dynamic Sit: NORMAL: No deviations seen in posture held dynamically  Static Stand: NORMAL: No deviations seen in posture held statically  Dynamic stand: NORMAL: No deviations seen in posture held dynamically    Therapeutic Activities and Exercises:  Functional mobility in room for ADLs; standing tolerance at sink during G&H axs ~ 10 min; education on home safety and energy conservation     AM-PAC 6 CLICK ADL   How much help from another person does this patient currently need?   1 = Unable, Total/Dependent Assistance  2 = A lot, Maximum/Moderate Assistance  3 = A little, Minimum/Contact Guard/Supervision  4 = None, Modified " "[place]/Independent    Putting on and taking off regular lower body clothing? : 4  Bathing (including washing, rinsing, drying)?: 3  Toileting, which includes using toilet, bedpan, or urinal? : 4  Putting on and taking off regular upper body clothing?: 4  Taking care of personal grooming such as brushing teeth?: 4  Eating meals?: 4  Total Score: 23     AM-PAC Raw Score CMS "G-Code Modifier Level of Impairment Assistance   6 % Total / Unable   7 - 8 CM 80 - 100% Maximal Assist   9-13 CL 60 - 80% Moderate Assist   14 - 19 CK 40 - 60% Moderate Assist   20 - 22 CJ 20 - 40% Minimal Assist   23 CI 1-20% SBA / CGA   24 CH 0% Independent/ Mod I       Patient left seated EOB  with all lines intact, call button in reach and nsg notified    ASSESSMENT:  Christiano Baer is a 56 y.o. male with a medical diagnosis of Bacteremia due to methicillin resistant Staphylococcus aureus. Pt performing ADLs and functional mobility mod I at this time in room; Pt does not endorse SOB during session and participation in axs. Pt educated on energy conservation techniques and given handout. Pt verbalized understanding of axs. Pt with no further therapy needs. D/c OT. Recommending shower chair for home use     Rehab identified problem list/impairments: Rehab identified problem list/impairments: impaired cardiopulmonary response to activity    Rehab potential is good.    Activity tolerance: Good    Discharge recommendations: Discharge Facility/Level Of Care Needs: home     Barriers to discharge: Barriers to Discharge: Decreased caregiver support (pt reports he does not have a residence at this time )    Equipment recommendations:  (none )     GOALS:    Occupational Therapy Goals     Not on file          Multidisciplinary Problems (Resolved)        Problem: Occupational Therapy Goal    Goal Priority Disciplines Outcome Interventions   Occupational Therapy Goal   (Resolved)     OT, PT/OT Outcome(s) achieved              "       Plan:  Patient to be seen  (D/c OT) to address the above listed problems via self-care/home management, therapeutic activities, therapeutic exercises  Plan of Care expires: 07/31/17  Plan of Care reviewed with: patient    OT G-codes  Functional Assessment Tool Used: shari pac   Score: 23  Functional Limitation: Self care  Self Care Goal Status (): RONALD  Self Care Discharge Status (): RONALD Vasquez, OT  07/31/2017

## 2017-07-31 NOTE — ASSESSMENT & PLAN NOTE
MRSa bacteremia with unclear source. CCT with right PA clot. Getting JANET today. Now cleared BCX. Resp culture with MRSA as well.     Rec:  ---cont vanco for total of 4 weeks from clearance.   ---document negative BCX after course since there is clot.
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
## 2017-07-31 NOTE — SUBJECTIVE & OBJECTIVE
Interval History: no new complaints; wating for JANET    Review of Systems   All other systems reviewed and are negative.    Objective:     Vital Signs (Most Recent):  Temp: 97.2 °F (36.2 °C) (07/31/17 1200)  Pulse: 104 (07/31/17 1202)  Resp: 18 (07/31/17 1202)  BP: (!) 147/91 (07/31/17 1200)  SpO2: 95 % (07/31/17 1202) Vital Signs (24h Range):  Temp:  [97.2 °F (36.2 °C)-98.6 °F (37 °C)] 97.2 °F (36.2 °C)  Pulse:  [] 104  Resp:  [18-20] 18  SpO2:  [95 %-98 %] 95 %  BP: (112-147)/(79-98) 147/91     Weight: 45.4 kg (100 lb)  Body mass index is 14.77 kg/m².    Estimated Creatinine Clearance: 64.6 mL/min (based on Cr of 0.8).    Physical Exam   Constitutional: He is oriented to person, place, and time. He appears well-developed and well-nourished. No distress.   HENT:   Head: Normocephalic and atraumatic.   Mouth/Throat: Oropharynx is clear and moist.   Poor dentition   Eyes: Conjunctivae and EOM are normal. Pupils are equal, round, and reactive to light.   [place] sclerae   Neck: Normal range of motion. Neck supple. No JVD present.   Cardiovascular: Normal rate, regular rhythm, normal heart sounds and intact distal pulses.    Pulmonary/Chest: Effort normal.   Right sided crackles; left clear   Abdominal: Soft. Bowel sounds are normal. He exhibits no distension. There is no tenderness.   Genitourinary:   Genitourinary Comments: No stoddard   Musculoskeletal:   LUE PIV; LLE old healed fracture site on tibia; no edema   Lymphadenopathy:     He has no cervical adenopathy.   Neurological: He is alert and oriented to person, place, and time. No cranial nerve deficit. He exhibits normal muscle tone. Coordination normal.   Skin: Capillary refill takes less than 2 seconds.   As above   Psychiatric:   Appropriate and cooperative   Nursing note and vitals reviewed.    Significant Labs:   Blood Culture:   Recent Labs  Lab 06/23/17  1357 06/23/17  2155 07/25/17  0249 07/25/17  0250 07/26/17  1040   PETRONA No growth after 5 days. No  growth after 5 days.  No growth after 5 days. Gram stain aer bottle: Gram positive cocci in clusters resembling Staph  Results called to and read back by:Philomena Cloud RN 07/25/2017  21:01  METHICILLIN RESISTANT STAPHYLOCOCCUS AUREUSID consult required at [facility]. Gram stain jennifer bottle: Gram positive cocci in clusters resembling Staph   Results called to and read back by:Mary Licona RN 07/26/2017  10:44  METHICILLIN RESISTANT STAPHYLOCOCCUS AUREUSID consult required at [facility].For susceptibility see order #[id number] No Growth to date  No Growth to date  No Growth to date  No Growth to date  No Growth to date  No Growth to date  No Growth to date  No Growth to date  No Growth to date  No Growth to date     CBC:   Recent Labs  Lab 07/31/17  0422   WBC 6.85   HGB 12.9*   HCT 38.3*        CMP:   Recent Labs  Lab 07/31/17  0421      K 4.4      CO2 27   GLU 79   BUN 14   CREATININE 0.8   CALCIUM 8.9   ANIONGAP 7*   EGFRNONAA >60     Respiratory Culture:   Recent Labs  Lab 06/24/17  0521 07/26/17  1436   GSRESP <10 epithelial cells per low power field.  Rare WBC's  Many Gram positive cocci  Many Gram negative rods  Moderate Gram negative diplococci <10 epithelial cells per low power field.  Few WBC's  Few Gram positive cocci   RESPIRATORYC STREPTOCOCCUS GROUP FFewNormal respiratory ignacio also present METHICILLIN RESISTANT STAPHYLOCOCCUS AUREUSManyNormal respiratory ignacio also present     Wound Culture: No results for input(s): LABAERO in the last 4320 hours.  All pertinent labs within the past 24 hours have been reviewed.    Significant Imaging: I have reviewed all pertinent imaging results/findings within the past 24 hours.

## 2017-07-31 NOTE — ASSESSMENT & PLAN NOTE
Started therapeutic enoxaparin on 7/29/17, and can transition to direct acting oral anticoagulant on discharge.
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
## 2017-07-31 NOTE — ASSESSMENT & PLAN NOTE
Low probability. AFB x 1 negative.  Continue airborne isolation while inpatient.  Collecting sputum samples for AFB per ID recommendations.

## 2017-07-31 NOTE — ASSESSMENT & PLAN NOTE
Supplemental oxygen dependent  O2 sat > 94% on supplemental oxygen.Takes mometasone-formoterol (Dulera) 200-5 mcg BID, albuterol inhaler PRN, albuterol-ipratropium nebulizer PRN.  Fluticasone-vilanterol 200-25 mcg daily in place of Dulera.  Continue nasal cannula.

## 2017-07-31 NOTE — PROGRESS NOTES
[facility]  Infectious Disease  Progress Note    Patient Name: Christiano Baer  MRN: [id number]  Admission Date: 7/25/2017  Length of Stay: 6 days  Attending Physician: Leroy Escobedo MD  Primary Care Provider: [facility]    Isolation Status: Contact and Airborne  Assessment/Plan:      History of TB (tuberculosis)    Being R/O for active TB. AFB x 1 negative. Doubt active TB        * Bacteremia due to methicillin resistant Staphylococcus aureus    MRSa bacteremia with unclear source. CCT with right PA clot. Getting JANET today. Now cleared BCX. Resp culture with MRSA as well.     Rec:  ---cont vanco for total of 4 weeks from clearance.   ---document negative BCX after course since there is clot.          Thank you for your consult. Will follow at a distance. Please call if ???    Pato Yarbrough MD  Infectious Disease  [facility]    Subjective:     Principal Problem:Bacteremia due to methicillin resistant Staphylococcus aureus    HPI:   Patient is a 57 y/o AA M with a PMH significant for COPD on 2L NC, hx of TB (treated in 2003), and hx of alcohol abuse with pancreatitis in 2014. Patient was a poor historian upon examination and history was obtained per chart review. Patient was recently hospitalized at [facility] from 6/23/2017 - 6/26/2017 for sepsis with hypotension secondary to CAP. During that admission, he was treated with Vancomcyin/Cefepime/Flagyl/and Ciprofloxacin initially. Blood cultures during that admission revealed no growth however respiratory cultures revealed Streptococcus Group F. Mr. Delacruz subsequently improved clinically and his antibiotics were de-escalated to moxifloxacin. He was subsequently discharged on a 7 day course of moxifloxacin. On 07/25/2017 the patient presented back to [facility] with complaints of cough, SOB, and pleuritic chest pain. Patient was found to be tachycardic and hypotensive and he was treated for sepsis. CXR  "revealed marked architectural distortion/scarring in the RIGHT hemithorax and hyperinflated L lung with emphysematous change but clear of acute disease. WBC was 22.52, Pro-Sadi is elevated at 18, and the patient has been febrile with a Tmax of 100.6 in the past 24 hours. Patient was started on vancomycin, ciprofloxacin, and cefepime. Subsequent blood cultures from admission have grown staphylococcus aureus. Upon examination patient endorses productive cough, pleuritic chest pain, and SOB.      Microbiology Data  6/23/2017     Blood Cultures x 2       NGTD  6/23/2017     Urine Culture               No growth  6/23/2017     Respiratory Culture     Streptococcus Group F. Gram Stain with many gram positive cocci, many gram negative rods, moderate gram negative diplococci.  7/25/2017     Blood Culture x 2         MRSA  Susceptibility    Methicillin resistant staphylococcus aureus    CULTURE, BLOOD    Clindamycin <=0.5 "><=0.5  Sensitive    Erythromycin <=0.5 "><=0.5  Sensitive    Oxacillin >2  Resistant    Penicillin >8  Resistant    Tetracycline <=4 "><=4  Sensitive    Trimeth/Sulfa <=0.5/9.5 "><=0.5/9.5  Sensitive    Vancomycin 1  Sensitive     7/26/2017     Blood Culture x 2                 NGTD  7/26/2017     Respiratory Culture             Staph Aureus (susceptibilities pending). Few WBC's; Few Gram Positive Cocci     7/27/2017     AFB Culture/Smear              No AFB seen on stain. Culture In Process  7/29/2017     AFB                                         In progress     Antibiotics  Cefepime            2g IVPB q12h                7/25 - 7/27  Ciprofloxacin       400mg IVPB x 1            7/25  Vancomycin        1g (in ED)/750mg qD    7/25 - present  Azithromycin        500mg/250mg qD        7/26 - 7/28    Interval History: no new complaints; wating for JANET    Review of Systems   All other systems reviewed and are negative.    Objective:     Vital Signs (Most Recent):  Temp: 97.2 °F (36.2 °C) (07/31/17 " 1200)  Pulse: 104 (07/31/17 1202)  Resp: 18 (07/31/17 1202)  BP: (!) 147/91 (07/31/17 1200)  SpO2: 95 % (07/31/17 1202) Vital Signs (24h Range):  Temp:  [97.2 °F (36.2 °C)-98.6 °F (37 °C)] 97.2 °F (36.2 °C)  Pulse:  [] 104  Resp:  [18-20] 18  SpO2:  [95 %-98 %] 95 %  BP: (112-147)/(79-98) 147/91     Weight: 45.4 kg (100 lb)  Body mass index is 14.77 kg/m².    Estimated Creatinine Clearance: 64.6 mL/min (based on Cr of 0.8).    Physical Exam   Constitutional: He is oriented to person, place, and time. He appears well-developed and well-nourished. No distress.   HENT:   Head: Normocephalic and atraumatic.   Mouth/Throat: Oropharynx is clear and moist.   Poor dentition   Eyes: Conjunctivae and EOM are normal. Pupils are equal, round, and reactive to light.   [place] sclerae   Neck: Normal range of motion. Neck supple. No JVD present.   Cardiovascular: Normal rate, regular rhythm, normal heart sounds and intact distal pulses.    Pulmonary/Chest: Effort normal.   Right sided crackles; left clear   Abdominal: Soft. Bowel sounds are normal. He exhibits no distension. There is no tenderness.   Genitourinary:   Genitourinary Comments: No stoddard   Musculoskeletal:   LUE PIV; LLE old healed fracture site on tibia; no edema   Lymphadenopathy:     He has no cervical adenopathy.   Neurological: He is alert and oriented to person, place, and time. No cranial nerve deficit. He exhibits normal muscle tone. Coordination normal.   Skin: Capillary refill takes less than 2 seconds.   As above   Psychiatric:   Appropriate and cooperative   Nursing note and vitals reviewed.    Significant Labs:   Blood Culture:   Recent Labs  Lab 06/23/17  1357 06/23/17  2155 07/25/17  0249 07/25/17  0250 07/26/17  1040   LABBLOO No growth after 5 days. No growth after 5 days.  No growth after 5 days. Gram stain aer bottle: Gram positive cocci in clusters resembling Staph  Results called to and read back by:Philomena Cloud RN 07/25/2017  21:01   METHICILLIN RESISTANT STAPHYLOCOCCUS AUREUSID consult required at [facility] and [place] locations. Gram stain jennifer bottle: Gram positive cocci in clusters resembling Staph   Results called to and read back by:Mary Licona RN 07/26/2017  10:44  METHICILLIN RESISTANT STAPHYLOCOCCUS AUREUSID consult required at [facility] and [facility].For susceptibility see order #[id number] No Growth to date  No Growth to date  No Growth to date  No Growth to date  No Growth to date  No Growth to date  No Growth to date  No Growth to date  No Growth to date  No Growth to date     CBC:   Recent Labs  Lab 07/31/17  0422   WBC 6.85   HGB 12.9*   HCT 38.3*        CMP:   Recent Labs  Lab 07/31/17  0421      K 4.4      CO2 27   GLU 79   BUN 14   CREATININE 0.8   CALCIUM 8.9   ANIONGAP 7*   EGFRNONAA >60     Respiratory Culture:   Recent Labs  Lab 06/24/17  0521 07/26/17  1436   GSRESP <10 epithelial cells per low power field.  Rare WBC's  Many Gram positive cocci  Many Gram negative rods  Moderate Gram negative diplococci <10 epithelial cells per low power field.  Few WBC's  Few Gram positive cocci   RESPIRATORYC STREPTOCOCCUS GROUP FFewNormal respiratory ignacio also present METHICILLIN RESISTANT STAPHYLOCOCCUS AUREUSManyNormal respiratory ignacio also present     Wound Culture: No results for input(s): LABAERO in the last 4320 hours.  All pertinent labs within the past 24 hours have been reviewed.    Significant Imaging: I have reviewed all pertinent imaging results/findings within the past 24 hours.

## 2017-07-31 NOTE — PLAN OF CARE
1337 Received per bed for JANET. To Bronchoscopy suite.ID verified x2. Monitors and O2 applied. Anesthesia at bedsidel  1340 Throat spray done  1340 Time out completed.  1344 Procedure started.  1408 Procedure ended.

## 2017-07-31 NOTE — ANESTHESIA POSTPROCEDURE EVALUATION
"Anesthesia Post Evaluation    Patient: Christiano Baer    Procedure(s) Performed: Procedure(s) (LRB):  TRANSESOPHAGEAL ECHOCARDIOGRAM (JANET) (N/A)    Final Anesthesia Type: MAC  Patient location during evaluation: OPS  Patient participation: Yes- Able to Participate  Level of consciousness: awake and alert  Post-procedure vital signs: reviewed and stable  Pain management: adequate  Airway patency: patent  PONV status at discharge: No PONV  Anesthetic complications: no      Cardiovascular status: blood pressure returned to baseline and hemodynamically stable  Respiratory status: unassisted and spontaneous ventilation  Hydration status: euvolemic  Follow-up not needed.        Visit Vitals  BP (!) 147/91 (BP Location: Right arm, Patient Position: Lying, BP Method: Automatic)   Pulse 104   Temp 36.2 °C (97.2 °F) (Axillary)   Resp 18   Ht 5' 9" (1.753 m)   Wt 45.4 kg (100 lb)   SpO2 95%   BMI 14.77 kg/m²       Pain/Dottie Score: Pain Assessment Performed: Yes (7/31/2017  1:00 PM)  Presence of Pain: complains of pain/discomfort (7/31/2017  1:00 PM)  Pain Rating Prior to Med Admin: 5 (7/31/2017  9:31 AM)  Pain Rating Post Med Admin: 0 (7/30/2017 12:51 AM)      "

## 2017-07-31 NOTE — PLAN OF CARE
Problem: Patient Care Overview  Goal: Plan of Care Review  SpO2   96% on   2.5lpm NC. No apparent distress noted. Will continue to monitor.

## 2017-07-31 NOTE — PROGRESS NOTES
[facility] Hospital Medicine  Progress Note    Patient Name: Christiano Baer  MRN: [id number]  Patient Class: IP- Inpatient   Admission Date: 7/25/2017  Length of Stay: 6 days  Attending Physician: Leroy Escobedo MD  Primary Care Provider: [facility]        Subjective:     Principal Problem:Bacteremia due to methicillin resistant Staphylococcus aureus    HPI:  Christiano Baer is a 56 y.o. black man with bullous emphysema due to cigarette smoking (stopped smoking on 6/23/17), chronic hypoxic respiratory failure (on 2 liters nasal cannula), history of tuberculosis, history of alcohol abuse with alcoholic pancreatitis on 2/23/14.   He lives in [place], [place].     He was hospitalized at [facility] from 6/23/17 to 6/26/17 for right middle and lower lung pneumonia treated with moxifloxacin.  He was hypotensive on amlodipine, hydrochlorothiazide, and valsartan, which were discontinued at that time.  He was discharged to [facility] skilled nursing facility.       He returned to [facility] on 7/25/17 with nausea, shortness of breath, cough, pleuritic chest pain worsened by coughing, headache, and generalized weakness.  Initially his oxygen saturation was as low as 89% on 3 liters nasal cannula.  He was given albuterol-ipratropium nebulizer treatment and oxygen saturation improved to high 90s on 2 liters.  He was also tachycardic (pulse up to 140) and hypotensive (blood pressure as low as 83/54), which improved after being given IV fluids.  Temperature was 100.1 F.  WBC count was 22,530.   Procalcitonin elevated at 18. Total bilirubin elevated at 2.5.  BNP elevated at 425.  Chest x-ray showed increased lateral right pleural thickening. Blood cultures were collected and he was given cefepime, vancomycin, and ciprofloxacin.   He was admitted to [facility].   He complained of leg pain and fatigue.  He is a poor  "historian and appeared anxious during on admission.     Hospital Course:  WBC count was 15,870 when he was discharged on 6/26/17, 22,530 on admission, and decreased to normal by 7/27/17 on cefepime and vancomycin.  Blood cultures on admission grew methicillin-resistant Staphylococcus aureus.  Sputum culture also grew it.  Infectious Disease was consulted.  They added azithromycin for his pneumonia and investigated possible recurrence of tuberculosis.  Cefepime and azithromycin were stopped once sputum culture results were reported on 7/28/17.  Infectious Disease also recommended JANET after a TTE showed no vegetation.  Cardiology discussed this with him and he agreed.  Infectious Disease also recommended chest CT.  Contrast was added for better diagnostic value in case he had malignancy due to smoking history.  Contrast chest CT on 7/29/17 incidentally showed right pulmonary embolism, so anticoagulation was started.  JANET on 7/31 showed no obvious evidence of thrombus or mass. ID recommends 4 weeks of Vancomycin from negative blood culture (through 8/22/2017).  Recommends repeat blood cultures after this to ensure clearing since there is a clot in the right pulmonary artery.  AFB x 1 negative and ID states "doubt active TB".       Interval History: Pt c/o painful swallowing since JANET. Denies CP, SOB.     Review of Systems   Constitutional: Negative for chills and fever.   HENT: Positive for sore throat.    Respiratory: Negative for shortness of breath.    Cardiovascular: Negative for chest pain and leg swelling.     Objective:     Vital Signs (Most Recent):  Temp: 97.2 °F (36.2 °C) (07/31/17 1200)  Pulse: (!) 136 (07/31/17 1445)  Resp: 18 (07/31/17 1445)  BP: (!) 138/99 (07/31/17 1445)  SpO2: 95 % (07/31/17 1445) Vital Signs (24h Range):  Temp:  [97.2 °F (36.2 °C)-98.6 °F (37 °C)] 97.2 °F (36.2 °C)  Pulse:  [] 136  Resp:  [18-20] 18  SpO2:  [95 %-98 %] 95 %  BP: (112-147)/() 138/99     Weight: 45.4 kg (100 " lb)  Body mass index is 14.77 kg/m².    Intake/Output Summary (Last 24 hours) at 07/31/17 1805  Last data filed at 07/31/17 1415   Gross per 24 hour   Intake              600 ml   Output                0 ml   Net              600 ml      Physical Exam   Constitutional: No distress.   HENT:   Yellow exudate on posterior pharynx    Cardiovascular: Normal rate and regular rhythm.    Pulmonary/Chest: Effort normal and breath sounds normal. No respiratory distress. He has no wheezes.   Abdominal: Soft.   Musculoskeletal: He exhibits no edema.   Neurological: He is alert.   Psychiatric: He has a normal mood and affect.   Nursing note and vitals reviewed.      Significant Labs:   BMP:   Recent Labs  Lab 07/31/17  0421   GLU 79      K 4.4      CO2 27   BUN 14   CREATININE 0.8   CALCIUM 8.9     CBC:   Recent Labs  Lab 07/31/17  0422   WBC 6.85   HGB 12.9*   HCT 38.3*          Significant Imaging:     JANET 7/31/2017 (Preliminary read)  -Post JANET care per protocol  -No obvious evidence for I.E.  -Enlarged RA/RV with volume overload and enlarged IVC that does not collapse. Has smoke in PA and across PV. Tachycardic.      Assessment/Plan:      * Bacteremia due to methicillin resistant Staphylococcus aureus    Pneumonia of right lung due to methicillin resistant Staphylococcus aureus (MRSA)  Continue vancomycin through 8/22 as per ID recs.  Repeat blood cultures NG x 5 days.  JANET shows no evidence of thrombus or mass. Afebrile, no elevation in WBC.         Right pulmonary embolus    Started therapeutic enoxaparin on 7/29/17, and can transition to direct acting oral anticoagulant on discharge.          History of TB (tuberculosis)    Low probability. AFB x 1 negative.  Continue airborne isolation while inpatient.  Collecting sputum samples for AFB per ID recommendations.        Chronic bullous emphysema    Supplemental oxygen dependent  O2 sat > 94% on supplemental oxygen.Takes mometasone-formoterol (Dulera)  200-5 mcg BID, albuterol inhaler PRN, albuterol-ipratropium nebulizer PRN.  Fluticasone-vilanterol 200-25 mcg daily in place of Dulera.  Continue nasal cannula.          Diastolic dysfunction    Pulmonary hypotension  Monitor for fluid overload. Appears compensated at present.           Former smoker    Stopped after last hospitalization on 6/23/17.          Muscular deconditioning    Return to [facility] on discharge.            VTE Risk Mitigation         Ordered     Medium Risk of VTE  Once      07/25/17 0727     Place sequential compression device  Until discontinued      07/25/17 0727     Place MARSHA hose  Until discontinued      07/25/17 0727          Maricel Max PA-C  Department of Hospital Medicine   [facility]  Pager: [phone]    Attending: Leroy Escobedo MD

## 2017-07-31 NOTE — PLAN OF CARE
Problem: Occupational Therapy Goal  Goal: Occupational Therapy Goal  Outcome: Outcome(s) achieved Date Met: 07/31/17  Pt performing ADLs and functional mobility mod I at this time in room; Pt does not endorse SOB during session and participation in axs. Pt educated on energy conservation techniques and given handout. Pt verbalized understanding of axs. Pt with no further therapy needs. D/c OT. Recommending shower chair for home use

## 2017-07-31 NOTE — PLAN OF CARE
07/31/17 1358   Discharge Reassessment   Assessment Type Discharge Planning Reassessment   Can the patient answer the patient profile reliably? Yes, cognitively intact   How does the patient rate their overall health at the present time? Poor   Discharge plan remains the same: Yes   Discharge Plan A Skilled Nursing Facility  (Return to [facility]- patient has been accepted back)   Discharge Plan B New Nursing Home placement - [facility] care facility   Explained to the the patient/caregiver why the discharge planned changed: Yes   Involved the patient/caregiver in establishing a new discharge plan: Yes     Juliane Kent, RN, [facility], CMSRN  RN Transition Navigator  [phone]

## 2017-08-01 VITALS
SYSTOLIC BLOOD PRESSURE: 114 MMHG | WEIGHT: 100 LBS | RESPIRATION RATE: 18 BRPM | TEMPERATURE: 98 F | HEART RATE: 101 BPM | OXYGEN SATURATION: 94 % | BODY MASS INDEX: 14.81 KG/M2 | HEIGHT: 69 IN | DIASTOLIC BLOOD PRESSURE: 90 MMHG

## 2017-08-01 LAB — VANCOMYCIN TROUGH SERPL-MCNC: 13.3 UG/ML

## 2017-08-01 PROCEDURE — 25000003 PHARM REV CODE 250: Performed by: PHYSICIAN ASSISTANT

## 2017-08-01 PROCEDURE — A4216 STERILE WATER/SALINE, 10 ML: HCPCS | Performed by: HOSPITALIST

## 2017-08-01 PROCEDURE — 25000003 PHARM REV CODE 250: Performed by: INTERNAL MEDICINE

## 2017-08-01 PROCEDURE — 94640 AIRWAY INHALATION TREATMENT: CPT

## 2017-08-01 PROCEDURE — 63600175 PHARM REV CODE 636 W HCPCS: Performed by: INTERNAL MEDICINE

## 2017-08-01 PROCEDURE — 63600175 PHARM REV CODE 636 W HCPCS: Performed by: HOSPITALIST

## 2017-08-01 PROCEDURE — 02HV33Z INSERTION OF INFUSION DEVICE INTO SUPERIOR VENA CAVA, PERCUTANEOUS APPROACH: ICD-10-PCS | Performed by: HOSPITALIST

## 2017-08-01 PROCEDURE — 94761 N-INVAS EAR/PLS OXIMETRY MLT: CPT

## 2017-08-01 PROCEDURE — 80202 ASSAY OF VANCOMYCIN: CPT

## 2017-08-01 PROCEDURE — 36415 COLL VENOUS BLD VENIPUNCTURE: CPT

## 2017-08-01 PROCEDURE — 25000003 PHARM REV CODE 250: Performed by: HOSPITALIST

## 2017-08-01 PROCEDURE — 25000003 PHARM REV CODE 250: Performed by: NURSE PRACTITIONER

## 2017-08-01 PROCEDURE — 99231 SBSQ HOSP IP/OBS SF/LOW 25: CPT | Mod: ,,, | Performed by: NURSE PRACTITIONER

## 2017-08-01 PROCEDURE — 25000242 PHARM REV CODE 250 ALT 637 W/ HCPCS: Performed by: PHYSICIAN ASSISTANT

## 2017-08-01 PROCEDURE — 27000221 HC OXYGEN, UP TO 24 HOURS

## 2017-08-01 RX ORDER — SODIUM CHLORIDE 0.9 % (FLUSH) 0.9 %
10 SYRINGE (ML) INJECTION
Status: DISCONTINUED | OUTPATIENT
Start: 2017-08-01 | End: 2017-08-01 | Stop reason: HOSPADM

## 2017-08-01 RX ORDER — SODIUM CHLORIDE 0.9 % (FLUSH) 0.9 %
10 SYRINGE (ML) INJECTION EVERY 6 HOURS
Status: DISCONTINUED | OUTPATIENT
Start: 2017-08-01 | End: 2017-08-01 | Stop reason: HOSPADM

## 2017-08-01 RX ADMIN — ACETAMINOPHEN 650 MG: 325 TABLET ORAL at 07:08

## 2017-08-01 RX ADMIN — ENOXAPARIN SODIUM 50 MG: 100 INJECTION SUBCUTANEOUS at 09:08

## 2017-08-01 RX ADMIN — SODIUM CHLORIDE, PRESERVATIVE FREE 10 ML: 5 INJECTION INTRAVENOUS at 05:08

## 2017-08-01 RX ADMIN — VANCOMYCIN HYDROCHLORIDE 1000 MG: 1 INJECTION, POWDER, LYOPHILIZED, FOR SOLUTION INTRAVENOUS at 07:08

## 2017-08-01 RX ADMIN — IPRATROPIUM BROMIDE AND ALBUTEROL SULFATE 3 ML: 2.5; .5 SOLUTION RESPIRATORY (INHALATION) at 12:08

## 2017-08-01 RX ADMIN — GABAPENTIN 300 MG: 300 CAPSULE ORAL at 09:08

## 2017-08-01 RX ADMIN — SODIUM CHLORIDE, PRESERVATIVE FREE 10 ML: 5 INJECTION INTRAVENOUS at 03:08

## 2017-08-01 RX ADMIN — FLUTICASONE FUROATE AND VILANTEROL TRIFENATATE 1 PUFF: 200; 25 POWDER RESPIRATORY (INHALATION) at 08:08

## 2017-08-01 RX ADMIN — ACETAMINOPHEN 650 MG: 325 TABLET ORAL at 03:08

## 2017-08-01 RX ADMIN — IPRATROPIUM BROMIDE AND ALBUTEROL SULFATE 3 ML: 2.5; .5 SOLUTION RESPIRATORY (INHALATION) at 07:08

## 2017-08-01 RX ADMIN — VANCOMYCIN HYDROCHLORIDE 1000 MG: 1 INJECTION, POWDER, LYOPHILIZED, FOR SOLUTION INTRAVENOUS at 05:08

## 2017-08-01 NOTE — NURSING
Report given to nurse at [facility]. IV site discontinued without adverse reaction. Discharge education and instructions given. Patient voices understanding. Waiting for wheelchair van to arrive.

## 2017-08-01 NOTE — SUBJECTIVE & OBJECTIVE
Interval History: JANET negative for IE; RA/RV dilatation and smoke. AFB x2 negative.    Review of Systems   All other systems reviewed and are negative.    Objective:     Vital Signs (Most Recent):  Temp: 98.3 °F (36.8 °C) (08/01/17 0800)  Pulse: 72 (08/01/17 0806)  Resp: 18 (08/01/17 0806)  BP: (!) 133/94 (08/01/17 0800)  SpO2: (!) 93 % (08/01/17 0806) Vital Signs (24h Range):  Temp:  [98 °F (36.7 °C)-98.7 °F (37.1 °C)] 98.3 °F (36.8 °C)  Pulse:  [] 72  Resp:  [16-19] 18  SpO2:  [85 %-97 %] 93 %  BP: (128-143)/() 133/94     Weight: 45.4 kg (100 lb)  Body mass index is 14.77 kg/m².    Estimated Creatinine Clearance: 64.6 mL/min (based on Cr of 0.8).    Physical Exam   Constitutional: He is oriented to person, place, and time. He appears well-developed and well-nourished. No distress.   HENT:   Head: Normocephalic and atraumatic.   Mouth/Throat: Oropharynx is clear and moist.   Poor dentition   Eyes: Conjunctivae and EOM are normal. Pupils are equal, round, and reactive to light.   [place] sclerae   Neck: Normal range of motion. Neck supple. No JVD present.   Cardiovascular: Normal rate, regular rhythm, normal heart sounds and intact distal pulses.    Pulmonary/Chest: Effort normal.   Right sided crackles; left clear   Abdominal: Soft. Bowel sounds are normal. He exhibits no distension. There is no tenderness.   Genitourinary:   Genitourinary Comments: No stoddard   Musculoskeletal:   LUE PIV; LLE old healed fracture site on tibia; no edema   Lymphadenopathy:     He has no cervical adenopathy.   Neurological: He is alert and oriented to person, place, and time. No cranial nerve deficit. He exhibits normal muscle tone. Coordination normal.   Skin: Capillary refill takes less than 2 seconds.   As above   Psychiatric:   Appropriate and cooperative   Nursing note and vitals reviewed.    Significant Labs:   Blood Culture:   Recent Labs  Lab 06/23/17  1357 06/23/17  2155 07/25/17  0249 07/25/17  0250 07/26/17  1040    LABBLOO No growth after 5 days. No growth after 5 days.  No growth after 5 days. Gram stain aer bottle: Gram positive cocci in clusters resembling Staph  Results called to and read back by:Philomena Cloud RN 07/25/2017  21:01  METHICILLIN RESISTANT STAPHYLOCOCCUS AUREUSID consult required at [facility]. Gram stain jennifer bottle: Gram positive cocci in clusters resembling Staph   Results called to and read back by:Mary Licona RN 07/26/2017  10:44  METHICILLIN RESISTANT STAPHYLOCOCCUS AUREUSID consult required at [facility].For susceptibility see order #[id number] No growth after 5 days.  No growth after 5 days.     CBC:   Recent Labs  Lab 07/31/17  0422   WBC 6.85   HGB 12.9*   HCT 38.3*        CMP:   Recent Labs  Lab 07/31/17  0421      K 4.4      CO2 27   GLU 79   BUN 14   CREATININE 0.8   CALCIUM 8.9   ANIONGAP 7*   EGFRNONAA >60     Respiratory Culture:   Recent Labs  Lab 06/24/17  0521 07/26/17  1436   GSRESP <10 epithelial cells per low power field.  Rare WBC's  Many Gram positive cocci  Many Gram negative rods  Moderate Gram negative diplococci <10 epithelial cells per low power field.  Few WBC's  Few Gram positive cocci   RESPIRATORYC STREPTOCOCCUS GROUP FFewNormal respiratory ignacio also present METHICILLIN RESISTANT STAPHYLOCOCCUS AUREUSManyNormal respiratory ignacio also present     All pertinent labs within the past 24 hours have been reviewed.    Significant Imaging: I have reviewed all pertinent imaging results/findings within the past 24 hours.

## 2017-08-01 NOTE — ASSESSMENT & PLAN NOTE
Presented with sepsis with hypotension, tachycardia and fever; blood cultures with MRSA on 7/25/2017; JANET due to no identifiable infectious source; JANET with no evidence of vegetation; CT scan with infectious etiology and feel MRSA likely PNA related

## 2017-08-01 NOTE — ASSESSMENT & PLAN NOTE
Pneumonia of right lung due to methicillin resistant Staphylococcus aureus (MRSA)  Continue vancomycin 1000 mg BID through 8/22/17 as per ID recs.  Trough 13.3 today. PICC placed today.  Repeat blood cultures NG x 5 days.  JANET shows no evidence of thrombus or mass. Afebrile, no elevation in WBC. Repeat blood cultures after completion of vancomycin to ensure they remain negative in this patient with a blood clot in the pulmonary artery.

## 2017-08-01 NOTE — PROGRESS NOTES
[facility]  Infectious Disease  Progress Note    Patient Name: Christiano Baer  MRN: [id number]  Admission Date: 7/25/2017  Length of Stay: 7 days  Attending Physician: Leroy Escobedo MD  Primary Care Provider: [facility]    Isolation Status: Contact  Assessment/Plan:      History of TB (tuberculosis)    Being R/O for active TB. AFB x 2 negative. Doubt active TB. Have stopped airborne isolation        Pneumonia of right lung due to methicillin resistant Staphylococcus aureus (MRSA)    Perhaps primary source of bacteremia        * Bacteremia due to methicillin resistant Staphylococcus aureus    MRSa bacteremia with unclear source. CCT with right PA clot. JANET with evidence of right heart strain. Now cleared BCX. Resp culture with MRSA as well.     Rec:  ---cont vanco for total of 4 weeks from clearance. End date 8/23/17 and reassess; target trough 15-20  ---weekly labs CBC CMP vanco trough  ---document negative BCX after course since there is clot.          Thank you for your consult. I will sign off. Please contact us if you have any additional questions.    Pato Yarbrough MD  Infectious Disease  [facility]    Subjective:     Principal Problem:Bacteremia due to methicillin resistant Staphylococcus aureus    HPI:   Patient is a 57 y/o AA M with a PMH significant for COPD on 2L NC, hx of TB (treated in 2003), and hx of alcohol abuse with pancreatitis in 2014. Patient was a poor historian upon examination and history was obtained per chart review. Patient was recently hospitalized at [facility] from 6/23/2017 - 6/26/2017 for sepsis with hypotension secondary to CAP. During that admission, he was treated with Vancomcyin/Cefepime/Flagyl/and Ciprofloxacin initially. Blood cultures during that admission revealed no growth however respiratory cultures revealed Streptococcus Group F. Mr. Delacruz subsequently improved clinically and his antibiotics were de-escalated  "to moxifloxacin. He was subsequently discharged on a 7 day course of moxifloxacin. On 07/25/2017 the patient presented back to [facility] with complaints of cough, SOB, and pleuritic chest pain. Patient was found to be tachycardic and hypotensive and he was treated for sepsis. CXR revealed marked architectural distortion/scarring in the RIGHT hemithorax and hyperinflated L lung with emphysematous change but clear of acute disease. WBC was 22.52, Pro-Sadi is elevated at 18, and the patient has been febrile with a Tmax of 100.6 in the past 24 hours. Patient was started on vancomycin, ciprofloxacin, and cefepime. Subsequent blood cultures from admission have grown staphylococcus aureus. Upon examination patient endorses productive cough, pleuritic chest pain, and SOB.      Microbiology Data  6/23/2017     Blood Cultures x 2       NGTD  6/23/2017     Urine Culture               No growth  6/23/2017     Respiratory Culture     Streptococcus Group F. Gram Stain with many gram positive cocci, many gram negative rods, moderate gram negative diplococci.  7/25/2017     Blood Culture x 2         MRSA  Susceptibility    Methicillin resistant staphylococcus aureus    CULTURE, BLOOD    Clindamycin <=0.5 "><=0.5  Sensitive    Erythromycin <=0.5 "><=0.5  Sensitive    Oxacillin >2  Resistant    Penicillin >8  Resistant    Tetracycline <=4 "><=4  Sensitive    Trimeth/Sulfa <=0.5/9.5 "><=0.5/9.5  Sensitive    Vancomycin 1  Sensitive     7/26/2017     Blood Culture x 2                 NGTD  7/26/2017     Respiratory Culture             Staph Aureus (susceptibilities pending). Few WBC's; Few Gram Positive Cocci     7/27/2017     AFB Culture/Smear              No AFB seen on stain. Culture In Process  7/29/2017     AFB                                         In progress     Antibiotics  Cefepime            2g IVPB q12h                7/25 - 7/27  Ciprofloxacin       400mg IVPB x 1            7/25  Vancomycin        1g (in ED)/750mg qD "    7/25 - present  Azithromycin        500mg/250mg qD        7/26 - 7/28    Interval History: JANET negative for IE; RA/RV dilatation and smoke. AFB x2 negative.    Review of Systems   All other systems reviewed and are negative.    Objective:     Vital Signs (Most Recent):  Temp: 98.3 °F (36.8 °C) (08/01/17 0800)  Pulse: 72 (08/01/17 0806)  Resp: 18 (08/01/17 0806)  BP: (!) 133/94 (08/01/17 0800)  SpO2: (!) 93 % (08/01/17 0806) Vital Signs (24h Range):  Temp:  [98 °F (36.7 °C)-98.7 °F (37.1 °C)] 98.3 °F (36.8 °C)  Pulse:  [] 72  Resp:  [16-19] 18  SpO2:  [85 %-97 %] 93 %  BP: (128-143)/() 133/94     Weight: 45.4 kg (100 lb)  Body mass index is 14.77 kg/m².    Estimated Creatinine Clearance: 64.6 mL/min (based on Cr of 0.8).    Physical Exam   Constitutional: He is oriented to person, place, and time. He appears well-developed and well-nourished. No distress.   HENT:   Head: Normocephalic and atraumatic.   Mouth/Throat: Oropharynx is clear and moist.   Poor dentition   Eyes: Conjunctivae and EOM are normal. Pupils are equal, round, and reactive to light.   Verbena sclerae   Neck: Normal range of motion. Neck supple. No JVD present.   Cardiovascular: Normal rate, regular rhythm, normal heart sounds and intact distal pulses.    Pulmonary/Chest: Effort normal.   Right sided crackles; left clear   Abdominal: Soft. Bowel sounds are normal. He exhibits no distension. There is no tenderness.   Genitourinary:   Genitourinary Comments: No stoddard   Musculoskeletal:   LUE PIV; RUE PICC; LLE old healed fracture site on tibia; no edema   Lymphadenopathy:     He has no cervical adenopathy.   Neurological: He is alert and oriented to person, place, and time. No cranial nerve deficit. He exhibits normal muscle tone. Coordination normal.   Skin: Capillary refill takes less than 2 seconds.   As above   Psychiatric:   Appropriate and cooperative   Nursing note and vitals reviewed.    Significant Labs:   Blood Culture:   Recent  Labs  Lab 06/23/17  1357 06/23/17  2155 07/25/17  0249 07/25/17  0250 07/26/17  1040   LABBLOO No growth after 5 days. No growth after 5 days.  No growth after 5 days. Gram stain aer bottle: Gram positive cocci in clusters resembling Staph  Results called to and read back by:Philomena Cloud RN 07/25/2017  21:01  METHICILLIN RESISTANT STAPHYLOCOCCUS AUREUSID consult required at [facility]. Gram stain jennifer bottle: Gram positive cocci in clusters resembling Staph   Results called to and read back by:Mary Licona RN 07/26/2017  10:44  METHICILLIN RESISTANT STAPHYLOCOCCUS AUREUSID consult required at [facility].For susceptibility see order #[id number] No growth after 5 days.  No growth after 5 days.     CBC:   Recent Labs  Lab 07/31/17  0422   WBC 6.85   HGB 12.9*   HCT 38.3*        CMP:   Recent Labs  Lab 07/31/17  0421      K 4.4      CO2 27   GLU 79   BUN 14   CREATININE 0.8   CALCIUM 8.9   ANIONGAP 7*   EGFRNONAA >60     Respiratory Culture:   Recent Labs  Lab 06/24/17  0521 07/26/17  1436   GSRESP <10 epithelial cells per low power field.  Rare WBC's  Many Gram positive cocci  Many Gram negative rods  Moderate Gram negative diplococci <10 epithelial cells per low power field.  Few WBC's  Few Gram positive cocci   RESPIRATORYC STREPTOCOCCUS GROUP FFewNormal respiratory ignacio also present METHICILLIN RESISTANT STAPHYLOCOCCUS AUREUSManyNormal respiratory ignacio also present     All pertinent labs within the past 24 hours have been reviewed.    Significant Imaging: I have reviewed all pertinent imaging results/findings within the past 24 hours.

## 2017-08-01 NOTE — PROGRESS NOTES
[facility] Hospital Medicine  Progress Note    Patient Name: Christiano Baer  MRN: [id number]  Patient Class: IP- Inpatient   Admission Date: 7/25/2017  Length of Stay: 7 days  Attending Physician: Leroy Escobedo MD  Primary Care Provider: [facility]        Subjective:     Principal Problem:Bacteremia due to methicillin resistant Staphylococcus aureus    HPI:  Christiano Baer is a 56 y.o. black man with bullous emphysema due to cigarette smoking (stopped smoking on 6/23/17), chronic hypoxic respiratory failure (on 2 liters nasal cannula), history of tuberculosis, history of alcohol abuse with alcoholic pancreatitis on 2/23/14.   He lives in [place], [place].     He was hospitalized at [facility] from 6/23/17 to 6/26/17 for right middle and lower lung pneumonia treated with moxifloxacin.  He was hypotensive on amlodipine, hydrochlorothiazide, and valsartan, which were discontinued at that time.  He was discharged to [facility] skilled nursing facility.       He returned to [facility] on 7/25/17 with nausea, shortness of breath, cough, pleuritic chest pain worsened by coughing, headache, and generalized weakness.  Initially his oxygen saturation was as low as 89% on 3 liters nasal cannula.  He was given albuterol-ipratropium nebulizer treatment and oxygen saturation improved to high 90s on 2 liters.  He was also tachycardic (pulse up to 140) and hypotensive (blood pressure as low as 83/54), which improved after being given IV fluids.  Temperature was 100.1 F.  WBC count was 22,530.   Procalcitonin elevated at 18. Total bilirubin elevated at 2.5.  BNP elevated at 425.  Chest x-ray showed increased lateral right pleural thickening. Blood cultures were collected and he was given cefepime, vancomycin, and ciprofloxacin.   He was admitted to [facility].   He complained of leg pain and fatigue.  He is a poor  "historian and appeared anxious during on admission.     Hospital Course:  WBC count was 15,870 when he was discharged on 6/26/17, 22,530 on admission, and decreased to normal by 7/27/17 on cefepime and vancomycin.  Blood cultures on admission grew methicillin-resistant Staphylococcus aureus.  Sputum culture also grew it.  Infectious Disease was consulted.  They added azithromycin for his pneumonia and investigated possible recurrence of tuberculosis.  Cefepime and azithromycin were stopped once sputum culture results were reported on 7/28/17.  Infectious Disease also recommended JANET after a TTE showed no vegetation.  Cardiology discussed this with him and he agreed.  Infectious Disease also recommended chest CT.  Contrast was added for better diagnostic value in case he had malignancy due to smoking history.  Contrast chest CT on 7/29/17 incidentally showed right pulmonary embolism, so anticoagulation was started.  JANET on 7/31 showed no obvious evidence of thrombus or mass. ID recommends 4 weeks of Vancomycin from negative blood culture (through 8/22/2017).  Recommends repeat blood cultures after this to ensure clearing since there is a clot in the right pulmonary artery.  AFB x 2 stain negative and ID states "doubt active TB".       Interval History: Pt without complaints today. States eating and ambulating without difficulty.     Review of Systems   Constitutional: Negative for chills and fever.   Respiratory: Negative for shortness of breath.    Cardiovascular: Negative for chest pain and leg swelling.   Musculoskeletal: Negative for arthralgias.     Objective:     Vital Signs (Most Recent):  Temp: 98.3 °F (36.8 °C) (08/01/17 0800)  Pulse: 72 (08/01/17 0806)  Resp: 18 (08/01/17 0806)  BP: (!) 133/94 (08/01/17 0800)  SpO2: (!) 93 % (08/01/17 0806) Vital Signs (24h Range):  Temp:  [97.2 °F (36.2 °C)-98.7 °F (37.1 °C)] 98.3 °F (36.8 °C)  Pulse:  [] 72  Resp:  [16-19] 18  SpO2:  [85 %-97 %] 93 %  BP: " (128-147)/() 133/94     Weight: 45.4 kg (100 lb)  Body mass index is 14.77 kg/m².    Intake/Output Summary (Last 24 hours) at 08/01/17 1153  Last data filed at 07/31/17 1800   Gross per 24 hour   Intake              960 ml   Output                0 ml   Net              960 ml      Physical Exam   Constitutional: He is oriented to person, place, and time. No distress.   Cardiovascular: Normal rate and regular rhythm.    Pulmonary/Chest: Effort normal and breath sounds normal. No respiratory distress. He has no wheezes.   Abdominal: Soft.   Musculoskeletal: He exhibits no edema.   Skin: PICC in right upper arm  Neurological: He is alert and oriented to person, place, and time.   Psychiatric: He has a normal mood and affect.   Nursing note and vitals reviewed.      Significant Labs: Vanco trough 13.3     Significant Imaging: no new    Assessment/Plan:      * Bacteremia due to methicillin resistant Staphylococcus aureus    Pneumonia of right lung due to methicillin resistant Staphylococcus aureus (MRSA)  Continue vancomycin through 8/22 as per ID recs.  Trough 13.3 today. PICC placed today.  Repeat blood cultures NG x 5 days.  JANET shows no evidence of thrombus or mass. Afebrile, no elevation in WBC.         Right pulmonary embolus    Started therapeutic enoxaparin on 7/29/17. Transitioning to Eliquis 10 mg BID x 7 days then 5 mg BID afterwards.          History of TB (tuberculosis)    Low probability. AFB culture stain x 2 negative.  Continue airborne isolation while inpatient.  Collecting sputum samples for AFB per ID recommendations.        Chronic bullous emphysema    Supplemental oxygen dependent  Takes mometasone-formoterol (Dulera) 200-5 mcg BID, albuterol inhaler PRN, albuterol-ipratropium nebulizer PRN.  Fluticasone-vilanterol 200-25 mcg daily in place of Dulera.  Continue nasal cannula.          Diastolic dysfunction    Pulmonary hypertension  Monitor for fluid overload. Appears compensated at present.            Former smoker    Stopped after last hospitalization on 6/23/17.          Muscular deconditioning    Return to [facility] on discharge.            VTE Risk Mitigation         Ordered     apixaban tablet 10 mg  2 times daily     Route:  Oral        08/01/17 1118     Medium Risk of VTE  Once      07/25/17 0727     Place sequential compression device  Until discontinued      07/25/17 0727     Place MARSHA hose  Until discontinued      07/25/17 0727          Maricel Max PA-C  Department of Hospital Medicine   [facility]  Pager: [phone]    Attending: Leroy Escobedo MD

## 2017-08-01 NOTE — DISCHARGE INSTRUCTIONS
APIXABAN ORAL TABLETS (ENGLISH) View Edit Remove   VANCOMYCIN INJECTION (ENGLISH) View Edit Remove   BACTEREMIA, SUSPECTED (ADULT) (ENGLISH) View Edit Remove   EMPHYSEMA, WHAT IS (ENGLISH) View Edit Remove   ADULT, PNEUMONIA (ENGLISH) View Edit Remove   PULMONARY EMBOLISM, DISCHARGE INSTRUCTIONS FOR (ENGLISH) View Edit Remove

## 2017-08-01 NOTE — ASSESSMENT & PLAN NOTE
Pneumonia of right lung due to methicillin resistant Staphylococcus aureus (MRSA)  Continue vancomycin through 8/22 as per ID recs.  Trough 13.3 today. PICC placed today.  Repeat blood cultures NG x 5 days.  JANET shows no evidence of thrombus or mass. Afebrile, no elevation in WBC.

## 2017-08-01 NOTE — ASSESSMENT & PLAN NOTE
Low probability. AFB culture stain x 2 negative.  Continue airborne isolation while inpatient.  Collecting sputum samples for AFB per ID recommendations.

## 2017-08-01 NOTE — PLAN OF CARE
Problem: Patient Care Overview  Goal: Plan of Care Review  Outcome: Ongoing (interventions implemented as appropriate)  Pt received on NC at 2 LPM. Pt SpO2 97%. No respiratory distress. Will continue to monitor.

## 2017-08-01 NOTE — ASSESSMENT & PLAN NOTE
Supplemental oxygen dependent  Resume home mometasone-formoterol (Dulera) 200-5 mcg BID, albuterol inhaler PRN, albuterol-ipratropium nebulizer PRN.

## 2017-08-01 NOTE — ASSESSMENT & PLAN NOTE
CT with evidence of extensive PE to RML and RLL with chronically appearance; agree with Eliquis 10mg po BID x 21 days followed by Eliquis 5mg po BID thereafter; given stable HR and BP and chronic appearance of PE no indication for thrombectomy and will continue with NOAC treatment

## 2017-08-01 NOTE — ASSESSMENT & PLAN NOTE
MRSa bacteremia with unclear source. CCT with right PA clot. JANET with evidence of right heart strain. Now cleared BCX. Resp culture with MRSA as well.     Rec:  ---cont vanco for total of 4 weeks from clearance. End date 8/23/17 and reassess; target trough 15-20  ---weekly labs CBC CMP vanco trough  ---document negative BCX after course since there is clot.

## 2017-08-01 NOTE — PLAN OF CARE
Problem: Patient Care Overview  Goal: Plan of Care Review  Outcome: Ongoing (interventions implemented as appropriate)  Patient on oxygen with documented flow of 2 LPM.  Will attempt to wean per O2 order protocol. Will continue to monitor.

## 2017-08-01 NOTE — ASSESSMENT & PLAN NOTE
Pulmonary hypertension  No evidence of fluid overload during this hospitalization. Increased PAP likely 2/2 right pulmonary artery PE.

## 2017-08-01 NOTE — SUBJECTIVE & OBJECTIVE
Interval History: Pt without complaints today. States eating and ambulating without difficulty.     Review of Systems   Constitutional: Negative for chills and fever.   Respiratory: Negative for shortness of breath.    Cardiovascular: Negative for chest pain and leg swelling.   Musculoskeletal: Negative for arthralgias.     Objective:     Vital Signs (Most Recent):  Temp: 98.3 °F (36.8 °C) (08/01/17 0800)  Pulse: 72 (08/01/17 0806)  Resp: 18 (08/01/17 0806)  BP: (!) 133/94 (08/01/17 0800)  SpO2: (!) 93 % (08/01/17 0806) Vital Signs (24h Range):  Temp:  [97.2 °F (36.2 °C)-98.7 °F (37.1 °C)] 98.3 °F (36.8 °C)  Pulse:  [] 72  Resp:  [16-19] 18  SpO2:  [85 %-97 %] 93 %  BP: (128-147)/() 133/94     Weight: 45.4 kg (100 lb)  Body mass index is 14.77 kg/m².    Intake/Output Summary (Last 24 hours) at 08/01/17 1153  Last data filed at 07/31/17 1800   Gross per 24 hour   Intake              960 ml   Output                0 ml   Net              960 ml      Physical Exam   Constitutional: He is oriented to person, place, and time. No distress.   Cardiovascular: Normal rate and regular rhythm.    Pulmonary/Chest: Effort normal and breath sounds normal. No respiratory distress. He has no wheezes.   Abdominal: Soft.   Musculoskeletal: He exhibits no edema.   Neurological: He is alert and oriented to person, place, and time.   Psychiatric: He has a normal mood and affect.   Nursing note and vitals reviewed.      Significant Labs: Vanco trough 13.3     Significant Imaging: no new

## 2017-08-01 NOTE — ASSESSMENT & PLAN NOTE
Started therapeutic enoxaparin on 7/29/17. Transitioning to Eliquis 10 mg BID x 7 days then 5 mg BID afterwards.

## 2017-08-01 NOTE — ASSESSMENT & PLAN NOTE
Started therapeutic enoxaparin on 7/29/17. Transitioned to Eliquis 10 mg BID x 7 days (8/8/2017) then 5 mg BID afterwards for at least 6 months.

## 2017-08-01 NOTE — DISCHARGE SUMMARY
[facility] Hospital Medicine  Discharge Summary      Patient Name: Christiano Baer  MRN: [id number]  Admission Date: 7/25/2017  Hospital Length of Stay: 7 days  Discharge Date and Time:  08/01/2017 1:08 PM  Attending Physician: Leroy Escobedo MD   Discharging Provider: Maricel Max PA-C  Primary Care Provider: [facility]      HPI:   Christiano Baer is a 56 y.o.male with bullous emphysema due to cigarette smoking (stopped smoking on 6/23/17), chronic hypoxic respiratory failure (on 2 liters nasal cannula), history of tuberculosis, history of alcohol abuse with alcoholic pancreatitis on 2/23/14.   He lives in [place], [place].     He was hospitalized at [facility] from 6/23/17 to 6/26/17 for right middle and lower lung pneumonia treated with moxifloxacin.  He was hypotensive on amlodipine, hydrochlorothiazide, and valsartan, which were discontinued at that time.  He was discharged to [facility] skilled nursing facility.       He returned to [facility] on 7/25/17 with nausea, shortness of breath, cough, pleuritic chest pain worsened by coughing, headache, and generalized weakness.  Initially his oxygen saturation was as low as 89% on 3 liters nasal cannula.  He was given albuterol-ipratropium nebulizer treatment and oxygen saturation improved to high 90s on 2 liters.  He was also tachycardic (pulse up to 140) and hypotensive (blood pressure as low as 83/54), which improved after being given IV fluids.  Temperature was 100.1 F.  WBC count was 22,530.   Procalcitonin elevated at 18. Total bilirubin elevated at 2.5.  BNP elevated at 425.  Chest x-ray showed increased lateral right pleural thickening. Blood cultures were collected and he was given cefepime, vancomycin, and ciprofloxacin.   [facility] was admitted to [facility].   He complained of leg pain and fatigue.  He is a poor historian and appeared anxious  during on admission.     Procedure(s) (LRB):  TRANSESOPHAGEAL ECHOCARDIOGRAM (JANET) (N/A)      Indwelling Lines/Drains at time of discharge:   Lines/Drains/Airways     Peripherally Inserted Central Catheter Line                 PICC Double Lumen 08/01/17 1035 right brachial less than 1 day              Hospital Course:   WBC count was 15,870 when he was discharged on 6/26/17, 22,530 on admission, and decreased to normal by 7/27/17 on cefepime and vancomycin.  Blood cultures on admission grew methicillin-resistant Staphylococcus aureus.  Sputum culture also grew it.  Infectious Disease was consulted.  They added azithromycin for his pneumonia and investigated possible recurrence of tuberculosis.  Cefepime and azithromycin were stopped once sputum culture results were reported on 7/28/17.  Infectious Disease recommended JANET after a TTE showed no vegetation. Infectious Disease also recommended chest CT.  Contrast was added for better diagnostic value in case he had malignancy due to smoking history.  Contrast chest CT on 7/29/17 incidentally showed right pulmonary embolism, so anticoagulation was started.  JANET on 7/31 showed no obvious evidence of thrombus or mass. ID recommends 4 weeks of Vancomycin from negative blood culture (through 8/22/2017).  Recommends repeat blood cultures after this to ensure clearing since there is a clot in the right pulmonary artery.  AFB x 2 stain negative and ID removed airborne isolation status.  PICC placed 8/1/2017.         Consults:   Consults         Status Ordering Provider     Inpatient consult to Anesthesiology  Once     Specialty:  Anesthesiology  Provider:  Nahomy Freitas MD    Acknowledged LIZETH VERGARA     Inpatient consult to [facility]  Once     Provider:  Presley Saucedo MD    Completed CORINNA CHESTER     Inpatient consult to Infectious Diseases  Once     Provider:  Joann Stanford MD    Completed DANA MENDEZ     Inpatient consult to PICC team  ([facility])  Once     Provider:  (Not yet assigned)    Acknowledged DANA MENDEZ          Significant Diagnostic Studies: Labs:   BMP:   Recent Labs  Lab 07/31/17  0421   GLU 79      K 4.4      CO2 27   BUN 14   CREATININE 0.8   CALCIUM 8.9   , CBC   Recent Labs  Lab 07/31/17  0422   WBC 6.85   HGB 12.9*   HCT 38.3*           Microbiology Results (last 7 days)     Procedure Component Value Units Date/Time    Blood culture [[id number]] Collected:  07/26/17 1040    Order Status:  Completed Specimen:  Blood Updated:  07/31/17 1622     Blood Culture, Routine No growth after 5 days.    Blood culture [[id number]] Collected:  07/26/17 1040    Order Status:  Completed Specimen:  Blood Updated:  07/31/17 1622     Blood Culture, Routine No growth after 5 days.    AFB Culture & Smear [[id number]] Collected:  07/29/17 1223    Order Status:  Completed Specimen:  Respiratory from Sputum, Expectorated Updated:  07/31/17 1429     AFB Culture & Smear Culture in progress     AFB CULTURE STAIN No acid fast bacilli seen.    AFB Culture & Smear [[id number]] Collected:  07/30/17 1715    Order Status:  Sent Specimen:  Blood from Sputum, Expectorated Updated:  07/30/17 1715    Culture, Respiratory with Gram Stain [[id number]]  (Susceptibility) Collected:  07/26/17 1436    Order Status:  Completed Specimen:  Respiratory from Sputum Updated:  07/29/17 1031     Respiratory Culture --     METHICILLIN RESISTANT STAPHYLOCOCCUS AUREUS  Many  Normal respiratory ignacio also present       Gram Stain (Respiratory) <10 epithelial cells per low power field.     Gram Stain (Respiratory) Few WBC's     Gram Stain (Respiratory) Few Gram positive cocci    AFB Culture & Smear [[id number]] Collected:  07/27/17 0748    Order Status:  Completed Specimen:  Respiratory from Sputum, Expectorated Updated:  07/28/17 2127     AFB Culture & Smear Culture in progress     AFB CULTURE STAIN No acid fast bacilli seen.    Narrative:       Please get sputum AFB  smear and culture X 3    Blood culture x two cultures. Draw prior to antibiotics. [[id number]] Collected:  07/25/17 0250    Order Status:  Completed Specimen:  Blood from Peripheral, Wrist, Left Updated:  07/28/17 1033     Blood Culture, Routine Gram stain jennifer bottle: Gram positive cocci in clusters resembling Staph      Blood Culture, Routine Results called to and read back by:Mary Licona RN 07/26/2017  10:44     Blood Culture, Routine --     METHICILLIN RESISTANT STAPHYLOCOCCUS AUREUS  ID consult required at [facility].  For susceptibility see order #[id number]      Narrative:       Aerobic and anaerobic    Blood culture x two cultures. Draw prior to antibiotics. [[id number]]  (Susceptibility) Collected:  07/25/17 0249    Order Status:  Completed Specimen:  Blood from Peripheral, Forearm, Right Updated:  07/27/17 1042     Blood Culture, Routine Gram stain aer bottle: Gram positive cocci in clusters resembling Staph     Blood Culture, Routine Results called to and read back by:hPilomena Cloud RN 07/25/2017  21:01     Blood Culture, Routine --     METHICILLIN RESISTANT STAPHYLOCOCCUS AUREUS  ID consult required at [facility].      Narrative:       Aerobic and anaerobic    AFB Culture & Smear [[id number]]     Order Status:  Canceled Specimen:  Respiratory from Sputum, Expectorated     AFB Culture & Smear [[id number]]     Order Status:  Canceled Specimen:  Respiratory from Sputum, Expectorated         Imaging Results          X-Ray Chest 1 View for PICC_Central line (Final result)  Result time 08/01/17 11:33:32    Final result by Earle Sofia MD (08/01/17 11:33:32)                 Impression:        As above.      Electronically signed by: EARLE SOFIA MD  Date:     08/01/17  Time:    11:33              Narrative:    History: PICC line placement.    Procedure: Chest one view    Findings    There is placement of a PICC line the tip of which is in the expected location of  the distal superior vena cava just above the level of the right mainstem bronchus.    Extensive bullous emphysematous changes in the right hemithorax remain without significant change from the previous study.  Left lung is clear.                             CT Chest With Contrast (Final result)  Result time 07/29/17 11:33:30    Final result by Raul Rao MD (07/29/17 11:33:30)                 Impression:      1.  Bulky thrombus involving the right main pulmonary artery with apparent occlusion of the right upper and middle lobe segmental pulmonary arteries.  Findings may be chronic in nature.    2.  Extensive bilateral emphysematous disease and with associated architectural architectural distortion throughout the right lung as seen on multiple prior radiographs.  Some associated septal thickening and interspersed consolidative densities throughout the right lung suggest possible superimposed infection.  No air-fluid levels.    3.  Trace right-sided pleural effusion which is likely loculated.    4.  Enhancing 1 cm focus in the right hepatic lobe, possible small hemangioma.  Triple phase CT or MRI could be obtained for confirmation.              Electronically signed by: RAUL RAO MD  Date:     07/29/17  Time:    11:33              Narrative:    Technique: CT of the chest was obtained following administration of 75 cc of IV Omnipaque 350.  Multiplanar reconstructions were obtained and reviewed.    Comparison: Multiple prior radiographs dating back to 7/14/2010    Findings:   Heart and Great vessels:There is eccentric nonocclusive thrombus present in the right main pulmonary artery with apparent occlusion of the right upper and middle lobe segmental pulmonary arteries.  Volume loss in the right hemithorax results in shift of mediastinal structures to the right.  Scattered calcified atherosclerotic plaque noted involving the thoracic aorta and great vessels.  Coronary artery calcifications are present.  No  pericardial effusion.    Thoracic Adenopathy:None demonstrated.  Low suspicion cystic structure noted along the right anterolateral margins of the trachea measuring approximately 2.6 x 1.7 cm axial spanning 7 cm craniocaudal, possibly congenital in nature.    Lungs: There is extensive bullous emphysematous disease with architectural distortion and chronic appearing volume loss throughout the right lung.  Overall appearance is similar to multiple prior radiographs dating back to least 2/23/2014.  Some septal thickening interspersed consolidative densities throughout the right lung are present.  Superimposed infection not excluded.  No air-fluid levels visualized.  There is a trace right-sided pleural effusion which is likely loculated.  Diffuse centrilobular emphysematous disease also noted throughout the left lung with with compensatory hyperinflation.  No pneumothorax visualized.  No suspicious nodular opacities.  Scattered calcified granulomas noted in the left lung.    Upper Abdomen: 1 cm avidly enhancing focus noted along the lateral margins of the posterior right hepatic lobe which may represent a small hemangioma.    Bones: Multiple chronic bilateral rib deformities noted.                             X-Ray Chest AP Portable (Final result)  Result time 07/25/17 03:26:47    Final result by Zak Ulloa MD (07/25/17 03:26:47)                 Impression:        Marked architectural distortion/scarring in the RIGHT hemithorax similar to prior except for increased pleural thickening laterally. Remainder of the exam appears unchanged.        Electronically signed by: ZAK ULLOA MD  Date:     07/25/17  Time:    03:26              Narrative:    Chest AP portable    Indication:sepsis.    Comparison:June 23, 2017.    Findings:     Marked architectural distortion/scarring in the RIGHT hemithorax similar to prior except for increased pleural thickening laterally. LEFT lung is hyperinflated with emphysematous  change but clear acute disease. Heart size is stable and mediastinum remain shifted slightly to the RIGHT, unchanged.                            2D Echo 7/27/2017  CONCLUSIONS     1 - Normal left ventricular systolic function (EF 55-60%).     2 - Pulmonary hypertension. The estimated PA systolic pressure is 57 mmHg.     3 - Right ventricular enlargement with normal systolic function.     4 - Impaired LV relaxation, normal LAP (grade 1 diastolic dysfunction).       JANET 7/31/2017  Very difficulty echo windows and no obvious valvular abnormalities noted. LA/MEHREEN without evidence of thrombus or mass. RA appears severely enlarged and RV function appears severely depressed. There is smoke at least grade III in the pulmonary artery and across the pulmonic valve. Surface echo reviewed also and there is evidence of RV pressure overload and PHTN. Again, very difficult echo windows.      Final Active Diagnoses:    Diagnosis Date Noted POA    PRINCIPAL PROBLEM:  Bacteremia due to methicillin resistant Staphylococcus aureus [R78.81] 06/23/2017 Yes    Right pulmonary embolus [I26.99] 07/29/2017 Yes    History of TB (tuberculosis) [Z86.11] 07/27/2017 Yes    Chronic bullous emphysema [J43.9] 11/08/2014 Yes     Chronic    Diastolic dysfunction [I51.9] 06/25/2017 Yes     Chronic    Former smoker [Z87.891] 06/23/2017 Not Applicable    Muscular deconditioning [R29.898] 06/23/2017 Yes    Supplemental oxygen dependent [Z99.81] 07/25/2017 Not Applicable     Chronic    Pulmonary hypertension [I27.2] 06/25/2017 Yes     Chronic    Pneumonia of right lung due to methicillin resistant Staphylococcus aureus (MRSA) [J15.212] 06/23/2017 Yes      Problems Resolved During this Admission:    Diagnosis Date Noted Date Resolved POA    Acute on chronic respiratory failure with hypoxia [J96.21] 06/23/2017 07/28/2017 Yes    MARLENA (acute kidney injury) [N17.9] 06/23/2017 07/27/2017 Yes    Hyperbilirubinemia [E80.6] 07/25/2017 07/27/2017 Yes     Hypomagnesemia [E83.42] 07/25/2017 07/27/2017 Yes    Leukocytosis [D72.829] 07/26/2017 07/27/2017 Yes    COPD exacerbation [J44.1] 06/23/2017 07/29/2017 Yes    Volume depletion [E86.9] 06/23/2017 07/26/2017 Yes    Hyponatremia [E87.1] 06/23/2017 07/26/2017 Yes      * Bacteremia due to methicillin resistant Staphylococcus aureus    Pneumonia of right lung due to methicillin resistant Staphylococcus aureus (MRSA)  Continue vancomycin 1000 mg BID through 8/22/17 as per ID recs.  Trough 13.3 today. PICC placed today.  Repeat blood cultures NG x 5 days.  JANET shows no evidence of thrombus or mass. Afebrile, no elevation in WBC. Repeat blood cultures after completion of vancomycin to ensure they remain negative in this patient with a blood clot in the pulmonary artery.         Right pulmonary embolus    Started therapeutic enoxaparin on 7/29/17. Transitioned to Eliquis 10 mg BID x 7 days (8/8/2017) then 5 mg BID afterwards for at least 6 months.          History of TB (tuberculosis)    Low probability. AFB culture stain x 2 negative.  Airborne isolation discontinued.         Chronic bullous emphysema    Supplemental oxygen dependent  Resume home mometasone-formoterol (Dulera) 200-5 mcg BID, albuterol inhaler PRN, albuterol-ipratropium nebulizer PRN.          Diastolic dysfunction    Pulmonary hypertension  No evidence of fluid overload during this hospitalization. Increased PAP likely 2/2 right pulmonary artery PE.           Former smoker    Stopped after last hospitalization on 6/23/17.          Muscular deconditioning    Return to [facility] on discharge.              Discharged Condition: stable    Disposition: Skilled Nursing Facility    Follow Up:  Follow-up Information     [facility] In 3 days.    Specialties:  Nursing Home Agency, SNF Agency  Why:  hospital follow-up  Contact information:  [facility] [address] [phone]                 Patient Instructions:     Diet general      Activity as tolerated     Call MD for:  severe uncontrolled pain     Call MD for:  redness, tenderness, or signs of infection (pain, swelling, redness, odor or green/yellow discharge around incision site)     Call MD for:  difficulty breathing or increased cough       Medications:  Reconciled Home Medications:   Current Discharge Medication List      START taking these medications    Details   apixaban 5 mg Tab Take 2 tablets (10 mg total) by mouth 2 (two) times daily. For 14 doses (through 8/8/2017) then start 1 tablet (5 mg) twice daily after that for at least 6 months (through 1/2018)    Associated Diagnoses: Right pulmonary embolus      VANCOMYCIN HCL (VANCOMYCIN 1 G/250 ML D5W, READY TO MIX SYSTEM,) Inject 250 mLs (1,000 mg total) into the vein every 12 (twelve) hours.    Associated Diagnoses: Bacteremia due to methicillin resistant Staphylococcus aureus         CONTINUE these medications which have NOT CHANGED    Details   albuterol-ipratropium 2.5mg-0.5mg/3mL (DUO-NEB) 0.5 mg-3 mg(2.5 mg base)/3 mL nebulizer solution Take 3 mLs by nebulization every 6 (six) hours as needed for Wheezing or Shortness of Breath. Rescue      gabapentin (NEURONTIN) 300 MG capsule Take 300 mg by mouth 3 (three) times daily.      lactobacillus acidophilus & bulgar (LACTINEX) 100 million cell packet Take 1 packet (1 each total) by mouth once daily.      mometasone-formoterol (DULERA) 200-5 mcg/actuation inhaler Inhale 2 puffs into the lungs 2 (two) times daily. Controller      albuterol 90 mcg/actuation inhaler Inhale 1-2 puffs into the lungs every 6 (six) hours as needed for Wheezing.  Qty: 1 Inhaler, Refills: 0           Time spent on the discharge of patient: 50 minutes    Maricel Max PA-C  Department of Hospital Medicine  [facility]  Pager: [phone]    Attending: Leroy Escobedo MD

## 2017-08-01 NOTE — PROGRESS NOTES
[facility]  Cardiology  Progress Note    Patient Name: Christiano Baer  MRN: [id number]  Admission Date: 7/25/2017  Hospital Length of Stay: 7 days  Code Status: Full Code   Attending Physician: Leroy Escobedo MD   Primary Care Physician: [facility]  Expected Discharge Date: 8/1/2017  Principal Problem:Bacteremia due to methicillin resistant Staphylococcus aureus    Subjective:     Hospital Course:   7/25/2017-7/27/2017 Presented to the ER with complaints of nausea, vomiting, body aches and SOB. Hypotensive and tachycardiac upon presentation with pulse ox 89% and temp 100.1. WBC 22,500. Blood cultures drawn and IV abx initiated. Blood cultures on 7/25/17 with MRSA 4 out of 4. Repeat blood cultures on 7/26/2017 with NGTD. ID consulted with maintenance of IV abx and recommendation for JANET due to unindentified source of infection. 7/28/2017 Cardiology consulted for JANET with plans to proceed on Monday (7/31/2017) 8/1/2017 Underwent JANET yesterday with mass noted to MEHREEN along with enlarged RA & IVC, RV dysfunction and sluggish filling of PA. Images reviewed per [facility] Cardiologist in echo lab with no evidence of valve vegetation and mass felt to be muscle related rather than clot or vegetation related. RA, RV, IVC and PA abnormalities related to PE etiology. HR and BP stable with stable pulse ox on 2LPM NC    No new subjective & objective note has been filed under this hospital service since the last note was generated.    Assessment and Plan:         Right pulmonary embolus    CT with evidence of extensive PE to RML and RLL with chronically appearance; agree with Eliquis 10mg po BID x 21 days followed by Eliquis 5mg po BID thereafter; given stable HR and BP and chronic appearance of PE no indication for thrombectomy and will continue with NOAC treatment         Diastolic dysfunction    Echo with normal LVEF, diastolic dysfunction and elevated PA pressure likely related to PE; no  s/s of volume overload throughout hospitalization           * Bacteremia due to methicillin resistant Staphylococcus aureus    Presented with sepsis with hypotension, tachycardia and fever; blood cultures with MRSA on 7/25/2017; JANET due to no identifiable infectious source; JANET with no evidence of vegetation; CT scan with infectious etiology and feel MRSA likely PNA related          Continue with management as detailed above and abx per ID/primary team recommendation. Will sign off for now but please feel free to call with any questions or concerns     VTE Risk Mitigation         Ordered     apixaban tablet 10 mg  2 times daily     Route:  Oral        08/01/17 1118     Medium Risk of VTE  Once      07/25/17 0727     Place sequential compression device  Until discontinued      07/25/17 0727     Place MARSHA hose  Until discontinued      07/25/17 0727          JOHNATHON Stevenson, ANP  Cardiology  [facility]

## 2017-08-01 NOTE — ASSESSMENT & PLAN NOTE
Echo with normal LVEF, diastolic dysfunction and elevated PA pressure likely related to PE; no s/s of volume overload throughout hospitalization

## 2017-08-01 NOTE — PLAN OF CARE
08/01/17 1514   Final Note   Assessment Type Final Discharge Note   Discharge Disposition SNF   Discharge planning education complete? Yes   Discharge plans and expectations educations in teach back method with documentation complete? Yes   Offered [facility] -- Bedside Delivery? n/a   Discharge/Hospital Encounter Summary to ([facility]) PCP No   Referral to Outpatient Case Management complete? No   Referral to / orders for Home Health Complete? No   30 day supply of medicines given at discharge, if documented non-compliance / non-adherence? No   Any social issues identified prior to discharge? No   Did you assess the readiness or willingness of the family or caregiver to support self management of care? No   Right Care Referral Info   Post Acute Recommendation SNF   Referral Type SNF   Facility Name [facility]     Called [facility] for wheelchair transportation to  patient to be charged to [facility] per Peri.    Juliane Kent, RN, CCM, CMSRN  RN Transition Navigator  [phone]

## 2017-08-01 NOTE — PLAN OF CARE
[facility]  Leroy Escobedo MD, [facility]   MD Pablo Dyer, YENI Lerma, [facility]  [address] [place] [id number]  Office Phone: [phone]  Office Fax: [phone]         NURSING HOME ORDERS    08/01/2017    Admit to Nursing Home:  Skilled Bed at [facility]                                                 Diagnoses:  Active Hospital Problems    Diagnosis  POA    *Bacteremia due to methicillin resistant Staphylococcus aureus [R78.81]  Yes     Priority: 1 - High    Right pulmonary embolus [I26.99]  Yes     Priority: 2     History of TB (tuberculosis) [Z86.11]  Yes     Priority: 3     Chronic bullous emphysema [J43.9]  Yes     Priority: 4      Chronic    Diastolic dysfunction [I51.9]  Yes     Priority: 5      Chronic     2D Echo 7/27/2017: EF 55% , +diastolic dysfunction, pulmonary hypertension (PAP 56)       Former smoker [Z87.891]  Not Applicable     Priority: 6     Muscular deconditioning [R29.898]  Yes     Priority: 7     Supplemental oxygen dependent [Z99.81]  Not Applicable     Chronic    Pulmonary hypertension [I27.2]  Yes     Chronic    Pneumonia of right lung due to methicillin resistant Staphylococcus aureus (MRSA) [J15.212]  Yes      Resolved Hospital Problems    Diagnosis Date Resolved POA    Acute on chronic respiratory failure with hypoxia [J96.21] 07/28/2017 Yes     Priority: 2     MARLENA (acute kidney injury) [N17.9] 07/27/2017 Yes     Priority: 3     Hyperbilirubinemia [E80.6] 07/27/2017 Yes     Priority: 4     Hypomagnesemia [E83.42] 07/27/2017 Yes     Priority: 6     Leukocytosis [D72.829] 07/27/2017 Yes    COPD exacerbation [J44.1] 07/29/2017 Yes    Volume depletion [E86.9] 07/26/2017 Yes    Hyponatremia [E87.1] 07/26/2017 Yes       Patient is homebound due to:  Bacteremia due to methicillin resistant Staphylococcus aureus    Allergies:Review of patient's allergies  indicates:  No Known Allergies    Vitals:       Every shift (Skilled Nursing patients)    Diet: 2 gram sodium diet     Acitivities:      - May ambulate independently    LABS:  Weekly CBC, CMP and Vancomycin trough (target 15-20) on Wednesdays for 4 weeks then per facility protocol. Repeat blood cultures on 8/28/2017.        Nursing Precautions:     - Supplemental oxygen: 2 liters via nasal cannula to keep O2 sat > 88%     HOME INFUSION THERAPY:    SN to perform Infusion Therapy/Central Line Care.  Review Central Line Care & Central Line Flush with patient.    Administer (drug and dose): Vancomycin 1 gram every 12 hours    Last dose given: 8/1/2017 0716                         Home dose due: 8/1/2017 1900    Scrub the Hub: Prior to accessing the line, always perform a 30 second alcohol scrub  Each lumen of the central line is to be flushed at least daily with 10 mL Normal Saline and 3 mL Heparin flush (100 units/mL)  Skilled Nurse (SN) may draw blood from IV access    Blood Draw Procedure:   - Aspirate at least 5 mL of blood   - Discard   - Obtain specimen   - Change posiflow cap   - Flush with 20 mL Normal Saline followed by a                 3-5 mL Heparin flush (100 units/mL)    Central :   - Sterile dressing changes are done weekly and as needed.   - Use chlor-hexadine scrub to cleanse site, apply Biopatch to insertion site,       apply securement device dressing   - Posi-flow caps are changed weekly and after EVERY lab draw.   - If sterile gauze is under dressing to control oozing,                 dressing change must be performed every 24 hours until gauze is not needed   - Remove PICC on or around 9/5/2017 unless told otherwise by an MD, PA or NP.        CONSULTS:     Physical Therapy to evaluate and treat 5 times weekly - resume prior Care Plan     Occupational Therapy to evaluate and treat 5 times weekly - resume prior Care Plan       MISCELLANEOUS CARE:      Routine Skin for Bedridden  Patients:  Apply moisture barrier cream to all    skin folds and wet areas in perineal area daily and after baths and                           all bowel movements.    Medications: Discontinue all previous medication orders, if any. See new list below.     Christiano Baer   Home Medication Instructions MATTHEW:[id number]    Printed on:08/01/17 7249   Medication Information                      albuterol 90 mcg/actuation inhaler  Inhale 1-2 puffs into the lungs every 6 (six) hours as needed for Wheezing.             albuterol-ipratropium 2.5mg-0.5mg/3mL (DUO-NEB) 0.5 mg-3 mg(2.5 mg base)/3 mL nebulizer solution  Take 3 mLs by nebulization every 6 (six) hours as needed for Wheezing or Shortness of Breath. Rescue             apixaban 5 mg Tab  Take 2 tablets (10 mg total) by mouth 2 (two) times daily for 14 doses (starting 1900 on 8/1/2017 through 1900 on 8/8/2017) then start 1 tablet (5 mg) twice daily after that for at least 6 months (through 1/2018)             gabapentin (NEURONTIN) 300 MG capsule  Take 300 mg by mouth 3 (three) times daily.             lactobacillus acidophilus & bulgar (LACTINEX) 100 million cell packet  Take 1 packet (1 each total) by mouth once daily.             mometasone-formoterol (DULERA) 200-5 mcg/actuation inhaler  Inhale 2 puffs into the lungs 2 (two) times daily. Controller             VANCOMYCIN HCL (VANCOMYCIN 1 G/250 ML D5W, READY TO MIX SYSTEM,)  Inject 250 mLs (1,000 mg total) into the vein every 12 (twelve) hours starting 1900 on 8/1/2017 through 1900 on 8/22/2017.

## 2017-08-02 ENCOUNTER — PATIENT OUTREACH (OUTPATIENT)
Dept: ADMINISTRATIVE | Facility: CLINIC | Age: 57
End: 2017-08-02

## 2017-08-04 LAB
AORTIC ATHEROMA: YES
DIASTOLIC DYSFUNCTION: NO
MITRAL VALVE MOBILITY: NORMAL
RETIRED EF AND QEF - SEE NOTES: 55 (ref 55–65)

## 2017-09-28 ENCOUNTER — TELEPHONE (OUTPATIENT)
Dept: INFECTIOUS DISEASES | Facility: HOSPITAL | Age: 57
End: 2017-09-28

## 2017-11-14 LAB
ACID FAST MOD KINY STN SPEC: NORMAL
MYCOBACTERIUM SPEC QL CULT: NORMAL
MYCOBACTERIUM SPEC QL CULT: NORMAL

## 2017-11-15 ENCOUNTER — TELEPHONE (OUTPATIENT)
Dept: INFECTIOUS DISEASES | Facility: HOSPITAL | Age: 57
End: 2017-11-15

## 2017-11-15 NOTE — TELEPHONE ENCOUNTER
Called Infection Control Nette Chaudhari to let her know that the AFB culture from 7/27/17 - one colony grew and it was MYCOBACTERIUM XENOPI. I asked her to call TB Control to let them know about the result. She reported back to me that she did notify them.     No need for treatment of the patient since this is likely a contaminant - only one colony grew and the other AFB sputum culture from that admission on 7/29 is still negative so far. 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

## 2018-01-17 LAB
ACID FAST MOD KINY STN SPEC: NORMAL
MYCOBACTERIUM SPEC QL CULT: NORMAL

## 2019-08-29 NOTE — ASSESSMENT & PLAN NOTE
1. Postop course has been uncomplicated.   2. Pathology reviewed in detail and all questions answered.   3. Restrictions lifted.   4. Follow up in 1 year for annual exam.    Resolved.  Likely 2/2 poor PO intake. Given mag rider in ED. Started on IVF. Monitor.

## 2019-12-22 NOTE — TELEPHONE ENCOUNTER
Received inbasket message from Microbiology lab that sputum culture collected 7/27 is growing AFB organism - not identified yet. There was only one colony growing and  is trying to identify it. May not be enough to do gene probe next week - she will check.     Patient has history of TB treated in 2003 at [facility].     He had staph bacteremia last admit and was ruled out for TB due to worsening CXR - probably had old scars as well.     2 sputum collected 7/27 and 2/29 for AFB culture - both smear negative. The only culture grwoing is 7/27.     Have called TB Control and spoke with Carlos Mora - he will reach out to the [facility] and get patient in for TB treatment at [facility] pending identification of this positive AFB sputum culture.    [id number] Detailed